# Patient Record
Sex: MALE | Race: WHITE | Employment: OTHER | URBAN - METROPOLITAN AREA
[De-identification: names, ages, dates, MRNs, and addresses within clinical notes are randomized per-mention and may not be internally consistent; named-entity substitution may affect disease eponyms.]

---

## 2017-01-01 ENCOUNTER — DOCUMENTATION ONLY (OUTPATIENT)
Dept: CARDIOLOGY | Facility: CLINIC | Age: 82
End: 2017-01-01

## 2017-01-01 ENCOUNTER — APPOINTMENT (OUTPATIENT)
Dept: GENERAL RADIOLOGY | Facility: CLINIC | Age: 82
End: 2017-01-01
Attending: FAMILY MEDICINE
Payer: MEDICARE

## 2017-01-01 ENCOUNTER — HOSPITAL ENCOUNTER (OUTPATIENT)
Facility: CLINIC | Age: 82
Setting detail: OBSERVATION
Discharge: NURSING, REHAB OR LONG-TERM CARE | End: 2017-09-22
Attending: FAMILY MEDICINE | Admitting: INTERNAL MEDICINE
Payer: MEDICARE

## 2017-01-01 ENCOUNTER — APPOINTMENT (OUTPATIENT)
Dept: PHYSICAL THERAPY | Facility: CLINIC | Age: 82
End: 2017-01-01
Attending: INTERNAL MEDICINE
Payer: MEDICARE

## 2017-01-01 ENCOUNTER — ALLIED HEALTH/NURSE VISIT (OUTPATIENT)
Dept: CARDIOLOGY | Facility: CLINIC | Age: 82
End: 2017-01-01
Payer: COMMERCIAL

## 2017-01-01 VITALS
RESPIRATION RATE: 16 BRPM | WEIGHT: 155.8 LBS | TEMPERATURE: 97 F | HEART RATE: 71 BPM | SYSTOLIC BLOOD PRESSURE: 177 MMHG | DIASTOLIC BLOOD PRESSURE: 72 MMHG | HEIGHT: 71 IN | BODY MASS INDEX: 21.81 KG/M2 | OXYGEN SATURATION: 97 %

## 2017-01-01 DIAGNOSIS — E86.0 DEHYDRATION: ICD-10-CM

## 2017-01-01 DIAGNOSIS — Z95.0 CARDIAC PACEMAKER IN SITU: Primary | ICD-10-CM

## 2017-01-01 DIAGNOSIS — R53.1 WEAKNESS: ICD-10-CM

## 2017-01-01 DIAGNOSIS — I50.9 CONGESTIVE HEART FAILURE, UNSPECIFIED CONGESTIVE HEART FAILURE CHRONICITY, UNSPECIFIED CONGESTIVE HEART FAILURE TYPE: ICD-10-CM

## 2017-01-01 LAB
ALBUMIN SERPL-MCNC: 3.6 G/DL (ref 3.4–5)
ALBUMIN UR-MCNC: 100 MG/DL
ALP SERPL-CCNC: 93 U/L (ref 40–150)
ALT SERPL W P-5'-P-CCNC: 18 U/L (ref 0–70)
ANION GAP SERPL CALCULATED.3IONS-SCNC: 10 MMOL/L (ref 3–14)
ANION GAP SERPL CALCULATED.3IONS-SCNC: 31 MMOL/L (ref 3–14)
APPEARANCE UR: CLEAR
AST SERPL W P-5'-P-CCNC: 12 U/L (ref 0–45)
BASOPHILS # BLD AUTO: 0 10E9/L (ref 0–0.2)
BASOPHILS NFR BLD AUTO: 0.2 %
BILIRUB SERPL-MCNC: 0.6 MG/DL (ref 0.2–1.3)
BILIRUB UR QL STRIP: NEGATIVE
BUN SERPL-MCNC: 42 MG/DL (ref 7–30)
BUN SERPL-MCNC: 49 MG/DL (ref 7–30)
CALCIUM SERPL-MCNC: 8.6 MG/DL (ref 8.5–10.1)
CALCIUM SERPL-MCNC: 9.3 MG/DL (ref 8.5–10.1)
CHLORIDE SERPL-SCNC: 102 MMOL/L (ref 94–109)
CHLORIDE SERPL-SCNC: 106 MMOL/L (ref 94–109)
CO2 SERPL-SCNC: 25 MMOL/L (ref 20–32)
CO2 SERPL-SCNC: 25 MMOL/L (ref 20–32)
COLOR UR AUTO: YELLOW
CREAT SERPL-MCNC: 2.45 MG/DL (ref 0.66–1.25)
CREAT SERPL-MCNC: 2.75 MG/DL (ref 0.66–1.25)
DIFFERENTIAL METHOD BLD: ABNORMAL
EOSINOPHIL # BLD AUTO: 0.1 10E9/L (ref 0–0.7)
EOSINOPHIL NFR BLD AUTO: 2.4 %
ERYTHROCYTE [DISTWIDTH] IN BLOOD BY AUTOMATED COUNT: 13.5 % (ref 10–15)
GFR SERPL CREATININE-BSD FRML MDRD: 22 ML/MIN/1.7M2
GFR SERPL CREATININE-BSD FRML MDRD: 25 ML/MIN/1.7M2
GLUCOSE BLDC GLUCOMTR-MCNC: 101 MG/DL (ref 70–99)
GLUCOSE BLDC GLUCOMTR-MCNC: 115 MG/DL (ref 70–99)
GLUCOSE SERPL-MCNC: 106 MG/DL (ref 70–99)
GLUCOSE SERPL-MCNC: 155 MG/DL (ref 70–99)
GLUCOSE UR STRIP-MCNC: 50 MG/DL
HCT VFR BLD AUTO: 35.4 % (ref 40–53)
HGB BLD-MCNC: 11.4 G/DL (ref 13.3–17.7)
HGB UR QL STRIP: NEGATIVE
HYALINE CASTS #/AREA URNS LPF: 1 /LPF (ref 0–2)
IMM GRANULOCYTES # BLD: 0 10E9/L (ref 0–0.4)
IMM GRANULOCYTES NFR BLD: 0 %
KETONES UR STRIP-MCNC: NEGATIVE MG/DL
LEUKOCYTE ESTERASE UR QL STRIP: NEGATIVE
LYMPHOCYTES # BLD AUTO: 0.5 10E9/L (ref 0.8–5.3)
LYMPHOCYTES NFR BLD AUTO: 9 %
MCH RBC QN AUTO: 30.2 PG (ref 26.5–33)
MCHC RBC AUTO-ENTMCNC: 32.2 G/DL (ref 31.5–36.5)
MCV RBC AUTO: 94 FL (ref 78–100)
MONOCYTES # BLD AUTO: 0.7 10E9/L (ref 0–1.3)
MONOCYTES NFR BLD AUTO: 12.5 %
MUCOUS THREADS #/AREA URNS LPF: PRESENT /LPF
NEUTROPHILS # BLD AUTO: 4.5 10E9/L (ref 1.6–8.3)
NEUTROPHILS NFR BLD AUTO: 75.9 %
NITRATE UR QL: NEGATIVE
NT-PROBNP SERPL-MCNC: 3344 PG/ML (ref 0–1800)
PH UR STRIP: 5 PH (ref 5–7)
PLATELET # BLD AUTO: 132 10E9/L (ref 150–450)
POTASSIUM SERPL-SCNC: 3.4 MMOL/L (ref 3.4–5.3)
POTASSIUM SERPL-SCNC: 4 MMOL/L (ref 3.4–5.3)
PROT SERPL-MCNC: 7.2 G/DL (ref 6.8–8.8)
RBC # BLD AUTO: 3.77 10E12/L (ref 4.4–5.9)
RBC #/AREA URNS AUTO: 4 /HPF (ref 0–2)
SODIUM SERPL-SCNC: 141 MMOL/L (ref 133–144)
SODIUM SERPL-SCNC: 158 MMOL/L (ref 133–144)
SOURCE: ABNORMAL
SP GR UR STRIP: 1.01 (ref 1–1.03)
SQUAMOUS #/AREA URNS AUTO: <1 /HPF (ref 0–1)
TROPONIN I SERPL-MCNC: 0.01 UG/L (ref 0–0.04)
UROBILINOGEN UR STRIP-MCNC: 0 MG/DL (ref 0–2)
WBC # BLD AUTO: 5.9 10E9/L (ref 4–11)
WBC #/AREA URNS AUTO: 1 /HPF (ref 0–2)

## 2017-01-01 PROCEDURE — 36415 COLL VENOUS BLD VENIPUNCTURE: CPT | Performed by: INTERNAL MEDICINE

## 2017-01-01 PROCEDURE — 25000128 H RX IP 250 OP 636: Performed by: FAMILY MEDICINE

## 2017-01-01 PROCEDURE — 83880 ASSAY OF NATRIURETIC PEPTIDE: CPT | Performed by: FAMILY MEDICINE

## 2017-01-01 PROCEDURE — 96372 THER/PROPH/DIAG INJ SC/IM: CPT

## 2017-01-01 PROCEDURE — 97162 PT EVAL MOD COMPLEX 30 MIN: CPT | Mod: GP

## 2017-01-01 PROCEDURE — 99219 ZZC INITIAL OBSERVATION CARE,LEVL II: CPT | Performed by: INTERNAL MEDICINE

## 2017-01-01 PROCEDURE — 84484 ASSAY OF TROPONIN QUANT: CPT | Performed by: FAMILY MEDICINE

## 2017-01-01 PROCEDURE — A9270 NON-COVERED ITEM OR SERVICE: HCPCS | Mod: GY | Performed by: INTERNAL MEDICINE

## 2017-01-01 PROCEDURE — 96361 HYDRATE IV INFUSION ADD-ON: CPT | Performed by: FAMILY MEDICINE

## 2017-01-01 PROCEDURE — 96360 HYDRATION IV INFUSION INIT: CPT | Performed by: FAMILY MEDICINE

## 2017-01-01 PROCEDURE — 00000146 ZZHCL STATISTIC GLUCOSE BY METER IP

## 2017-01-01 PROCEDURE — 80048 BASIC METABOLIC PNL TOTAL CA: CPT | Performed by: INTERNAL MEDICINE

## 2017-01-01 PROCEDURE — 93294 REM INTERROG EVL PM/LDLS PM: CPT | Performed by: INTERNAL MEDICINE

## 2017-01-01 PROCEDURE — 81001 URINALYSIS AUTO W/SCOPE: CPT | Performed by: FAMILY MEDICINE

## 2017-01-01 PROCEDURE — 99285 EMERGENCY DEPT VISIT HI MDM: CPT | Mod: 25 | Performed by: FAMILY MEDICINE

## 2017-01-01 PROCEDURE — 96361 HYDRATE IV INFUSION ADD-ON: CPT

## 2017-01-01 PROCEDURE — 99217 ZZC OBSERVATION CARE DISCHARGE: CPT | Performed by: PEDIATRICS

## 2017-01-01 PROCEDURE — 80053 COMPREHEN METABOLIC PANEL: CPT | Performed by: FAMILY MEDICINE

## 2017-01-01 PROCEDURE — 25000131 ZZH RX MED GY IP 250 OP 636 PS 637: Mod: GY | Performed by: INTERNAL MEDICINE

## 2017-01-01 PROCEDURE — 71010 XR CHEST PORT 1 VW: CPT | Mod: TC

## 2017-01-01 PROCEDURE — 27210995 ZZH RX 272: Performed by: INTERNAL MEDICINE

## 2017-01-01 PROCEDURE — G0378 HOSPITAL OBSERVATION PER HR: HCPCS

## 2017-01-01 PROCEDURE — 40000193 ZZH STATISTIC PT WARD VISIT

## 2017-01-01 PROCEDURE — 93296 REM INTERROG EVL PM/IDS: CPT | Performed by: INTERNAL MEDICINE

## 2017-01-01 PROCEDURE — 85025 COMPLETE CBC W/AUTO DIFF WBC: CPT | Performed by: FAMILY MEDICINE

## 2017-01-01 PROCEDURE — 93005 ELECTROCARDIOGRAM TRACING: CPT | Mod: 59 | Performed by: FAMILY MEDICINE

## 2017-01-01 PROCEDURE — 25800025 ZZH RX 258: Performed by: INTERNAL MEDICINE

## 2017-01-01 PROCEDURE — 25000132 ZZH RX MED GY IP 250 OP 250 PS 637: Mod: GY | Performed by: INTERNAL MEDICINE

## 2017-01-01 PROCEDURE — 99284 EMERGENCY DEPT VISIT MOD MDM: CPT | Mod: Z6 | Performed by: FAMILY MEDICINE

## 2017-01-01 RX ORDER — ONDANSETRON 2 MG/ML
4 INJECTION INTRAMUSCULAR; INTRAVENOUS EVERY 6 HOURS PRN
Status: DISCONTINUED | OUTPATIENT
Start: 2017-01-01 | End: 2017-01-01 | Stop reason: HOSPADM

## 2017-01-01 RX ORDER — AMLODIPINE BESYLATE 5 MG/1
10 TABLET ORAL DAILY
Status: DISCONTINUED | OUTPATIENT
Start: 2017-01-01 | End: 2017-01-01 | Stop reason: HOSPADM

## 2017-01-01 RX ORDER — DEXTROSE MONOHYDRATE 25 G/50ML
25-50 INJECTION, SOLUTION INTRAVENOUS
Status: DISCONTINUED | OUTPATIENT
Start: 2017-01-01 | End: 2017-01-01 | Stop reason: HOSPADM

## 2017-01-01 RX ORDER — ASPIRIN 81 MG/1
81 TABLET ORAL DAILY
Status: DISCONTINUED | OUTPATIENT
Start: 2017-01-01 | End: 2017-01-01 | Stop reason: HOSPADM

## 2017-01-01 RX ORDER — METOPROLOL SUCCINATE 50 MG/1
50 TABLET, EXTENDED RELEASE ORAL DAILY
Status: DISCONTINUED | OUTPATIENT
Start: 2017-01-01 | End: 2017-01-01 | Stop reason: HOSPADM

## 2017-01-01 RX ORDER — SODIUM CHLORIDE 9 MG/ML
1000 INJECTION, SOLUTION INTRAVENOUS CONTINUOUS
Status: DISCONTINUED | OUTPATIENT
Start: 2017-01-01 | End: 2017-01-01

## 2017-01-01 RX ORDER — SODIUM CHLORIDE 450 MG/100ML
INJECTION, SOLUTION INTRAVENOUS CONTINUOUS
Status: DISCONTINUED | OUTPATIENT
Start: 2017-01-01 | End: 2017-01-01 | Stop reason: HOSPADM

## 2017-01-01 RX ORDER — ONDANSETRON 4 MG/1
4 TABLET, ORALLY DISINTEGRATING ORAL EVERY 6 HOURS PRN
Status: DISCONTINUED | OUTPATIENT
Start: 2017-01-01 | End: 2017-01-01 | Stop reason: HOSPADM

## 2017-01-01 RX ORDER — NICOTINE POLACRILEX 4 MG
15-30 LOZENGE BUCCAL
Status: DISCONTINUED | OUTPATIENT
Start: 2017-01-01 | End: 2017-01-01 | Stop reason: HOSPADM

## 2017-01-01 RX ORDER — FINASTERIDE 5 MG/1
5 TABLET, FILM COATED ORAL DAILY
Status: DISCONTINUED | OUTPATIENT
Start: 2017-01-01 | End: 2017-01-01 | Stop reason: HOSPADM

## 2017-01-01 RX ORDER — NALOXONE HYDROCHLORIDE 0.4 MG/ML
.1-.4 INJECTION, SOLUTION INTRAMUSCULAR; INTRAVENOUS; SUBCUTANEOUS
Status: DISCONTINUED | OUTPATIENT
Start: 2017-01-01 | End: 2017-01-01 | Stop reason: HOSPADM

## 2017-01-01 RX ORDER — CLOPIDOGREL BISULFATE 75 MG/1
75 TABLET ORAL DAILY
Status: DISCONTINUED | OUTPATIENT
Start: 2017-01-01 | End: 2017-01-01 | Stop reason: HOSPADM

## 2017-01-01 RX ADMIN — SODIUM CHLORIDE 500 ML: 9 INJECTION, SOLUTION INTRAVENOUS at 21:03

## 2017-01-01 RX ADMIN — SODIUM CHLORIDE 500 ML: 4.5 INJECTION, SOLUTION INTRAVENOUS at 00:50

## 2017-01-01 RX ADMIN — INSULIN GLARGINE 20 UNITS: 100 INJECTION, SOLUTION SUBCUTANEOUS at 08:41

## 2017-01-01 RX ADMIN — OMEPRAZOLE 20 MG: 20 CAPSULE, DELAYED RELEASE ORAL at 08:42

## 2017-01-01 RX ADMIN — SODIUM CHLORIDE: 4.5 INJECTION, SOLUTION INTRAVENOUS at 00:32

## 2017-01-01 RX ADMIN — SODIUM CHLORIDE: 4.5 INJECTION, SOLUTION INTRAVENOUS at 05:18

## 2017-01-01 RX ADMIN — AMLODIPINE BESYLATE 10 MG: 5 TABLET ORAL at 08:42

## 2017-01-01 RX ADMIN — SODIUM CHLORIDE: 4.5 INJECTION, SOLUTION INTRAVENOUS at 13:23

## 2017-01-01 RX ADMIN — ASPIRIN 81 MG: 81 TABLET, COATED ORAL at 08:42

## 2017-01-01 RX ADMIN — METOPROLOL SUCCINATE 50 MG: 50 TABLET, EXTENDED RELEASE ORAL at 08:42

## 2017-01-01 RX ADMIN — CLOPIDOGREL 75 MG: 75 TABLET, FILM COATED ORAL at 08:42

## 2017-01-01 RX ADMIN — FINASTERIDE 5 MG: 5 TABLET, FILM COATED ORAL at 08:42

## 2017-01-01 ASSESSMENT — ACTIVITIES OF DAILY LIVING (ADL)
TOILETING: 0-->INDEPENDENT
NUMBER_OF_TIMES_PATIENT_HAS_FALLEN_WITHIN_LAST_SIX_MONTHS: 6
WHICH_OF_THE_ABOVE_FUNCTIONAL_RISKS_HAD_A_RECENT_ONSET_OR_CHANGE?: AMBULATION
FALL_HISTORY_WITHIN_LAST_SIX_MONTHS: YES
RETIRED_EATING: 0-->INDEPENDENT
CHANGE_IN_FUNCTIONAL_STATUS_SINCE_ONSET_OF_CURRENT_ILLNESS/INJURY: NO
COGNITION: 0 - NO COGNITION ISSUES REPORTED
RETIRED_COMMUNICATION: 0-->UNDERSTANDS/COMMUNICATES WITHOUT DIFFICULTY
AMBULATION: 1-->ASSISTIVE EQUIPMENT
DRESS: 0-->INDEPENDENT
BATHING: 2-->ASSISTIVE PERSON
SWALLOWING: 0-->SWALLOWS FOODS/LIQUIDS WITHOUT DIFFICULTY
TRANSFERRING: 1-->ASSISTIVE EQUIPMENT

## 2017-02-22 ENCOUNTER — HOSPITAL ENCOUNTER (EMERGENCY)
Facility: CLINIC | Age: 82
Discharge: HOME | End: 2017-02-22
Attending: FAMILY MEDICINE | Admitting: FAMILY MEDICINE
Payer: MEDICARE

## 2017-02-22 VITALS
RESPIRATION RATE: 14 BRPM | TEMPERATURE: 96.9 F | SYSTOLIC BLOOD PRESSURE: 157 MMHG | OXYGEN SATURATION: 96 % | HEART RATE: 70 BPM | BODY MASS INDEX: 24.41 KG/M2 | DIASTOLIC BLOOD PRESSURE: 77 MMHG | WEIGHT: 175 LBS

## 2017-02-22 DIAGNOSIS — M62.81 GENERALIZED MUSCLE WEAKNESS: ICD-10-CM

## 2017-02-22 LAB
ALBUMIN SERPL-MCNC: 3.1 G/DL (ref 3.4–5)
ALBUMIN UR-MCNC: 30 MG/DL
ALP SERPL-CCNC: 83 U/L (ref 40–150)
ALT SERPL W P-5'-P-CCNC: 18 U/L (ref 0–70)
ANION GAP SERPL CALCULATED.3IONS-SCNC: 10 MMOL/L (ref 3–14)
APPEARANCE UR: CLEAR
AST SERPL W P-5'-P-CCNC: 11 U/L (ref 0–45)
BACTERIA #/AREA URNS HPF: ABNORMAL /HPF
BASOPHILS # BLD AUTO: 0 10E9/L (ref 0–0.2)
BASOPHILS NFR BLD AUTO: 0.2 %
BILIRUB SERPL-MCNC: 0.7 MG/DL (ref 0.2–1.3)
BILIRUB UR QL STRIP: NEGATIVE
BUN SERPL-MCNC: 35 MG/DL (ref 7–30)
CALCIUM SERPL-MCNC: 8.7 MG/DL (ref 8.5–10.1)
CHLORIDE SERPL-SCNC: 110 MMOL/L (ref 94–109)
CO2 SERPL-SCNC: 25 MMOL/L (ref 20–32)
COLOR UR AUTO: YELLOW
CREAT SERPL-MCNC: 2.16 MG/DL (ref 0.66–1.25)
DIFFERENTIAL METHOD BLD: ABNORMAL
EOSINOPHIL # BLD AUTO: 0.3 10E9/L (ref 0–0.7)
EOSINOPHIL NFR BLD AUTO: 5.8 %
ERYTHROCYTE [DISTWIDTH] IN BLOOD BY AUTOMATED COUNT: 13.5 % (ref 10–15)
GFR SERPL CREATININE-BSD FRML MDRD: 29 ML/MIN/1.7M2
GLUCOSE SERPL-MCNC: 129 MG/DL (ref 70–99)
GLUCOSE UR STRIP-MCNC: NEGATIVE MG/DL
HCT VFR BLD AUTO: 34.8 % (ref 40–53)
HEMOCCULT STL QL: NEGATIVE
HGB BLD-MCNC: 11.5 G/DL (ref 13.3–17.7)
HGB UR QL STRIP: ABNORMAL
HYALINE CASTS #/AREA URNS LPF: ABNORMAL /LPF (ref 0–2)
IMM GRANULOCYTES # BLD: 0 10E9/L (ref 0–0.4)
IMM GRANULOCYTES NFR BLD: 0.2 %
KETONES UR STRIP-MCNC: NEGATIVE MG/DL
LEUKOCYTE ESTERASE UR QL STRIP: NEGATIVE
LIPASE SERPL-CCNC: 35 U/L (ref 73–393)
LYMPHOCYTES # BLD AUTO: 0.7 10E9/L (ref 0.8–5.3)
LYMPHOCYTES NFR BLD AUTO: 13.7 %
MCH RBC QN AUTO: 30.4 PG (ref 26.5–33)
MCHC RBC AUTO-ENTMCNC: 33 G/DL (ref 31.5–36.5)
MCV RBC AUTO: 92 FL (ref 78–100)
MONOCYTES # BLD AUTO: 0.5 10E9/L (ref 0–1.3)
MONOCYTES NFR BLD AUTO: 10.6 %
NEUTROPHILS # BLD AUTO: 3.4 10E9/L (ref 1.6–8.3)
NEUTROPHILS NFR BLD AUTO: 69.5 %
NITRATE UR QL: NEGATIVE
NON-SQ EPI CELLS #/AREA URNS LPF: ABNORMAL /LPF
NT-PROBNP SERPL-MCNC: 1992 PG/ML (ref 0–1800)
PH UR STRIP: 6 PH (ref 5–7)
PLATELET # BLD AUTO: 155 10E9/L (ref 150–450)
POTASSIUM SERPL-SCNC: 3.5 MMOL/L (ref 3.4–5.3)
PROT SERPL-MCNC: 6.3 G/DL (ref 6.8–8.8)
RBC # BLD AUTO: 3.78 10E12/L (ref 4.4–5.9)
RBC #/AREA URNS AUTO: ABNORMAL /HPF (ref 0–2)
SODIUM SERPL-SCNC: 145 MMOL/L (ref 133–144)
SP GR UR STRIP: 1.01 (ref 1–1.03)
TROPONIN I SERPL-MCNC: 0.02 UG/L (ref 0–0.04)
URN SPEC COLLECT METH UR: ABNORMAL
UROBILINOGEN UR STRIP-ACNC: 0.2 EU/DL (ref 0.2–1)
WBC # BLD AUTO: 4.8 10E9/L (ref 4–11)
WBC #/AREA URNS AUTO: ABNORMAL /HPF (ref 0–2)

## 2017-02-22 PROCEDURE — 25000128 H RX IP 250 OP 636: Performed by: FAMILY MEDICINE

## 2017-02-22 PROCEDURE — 93010 ELECTROCARDIOGRAM REPORT: CPT | Performed by: FAMILY MEDICINE

## 2017-02-22 PROCEDURE — 99284 EMERGENCY DEPT VISIT MOD MDM: CPT | Mod: 25

## 2017-02-22 PROCEDURE — 80053 COMPREHEN METABOLIC PANEL: CPT | Performed by: FAMILY MEDICINE

## 2017-02-22 PROCEDURE — 96360 HYDRATION IV INFUSION INIT: CPT

## 2017-02-22 PROCEDURE — 96361 HYDRATE IV INFUSION ADD-ON: CPT

## 2017-02-22 PROCEDURE — 83690 ASSAY OF LIPASE: CPT | Performed by: FAMILY MEDICINE

## 2017-02-22 PROCEDURE — 82272 OCCULT BLD FECES 1-3 TESTS: CPT | Performed by: FAMILY MEDICINE

## 2017-02-22 PROCEDURE — 85025 COMPLETE CBC W/AUTO DIFF WBC: CPT | Performed by: FAMILY MEDICINE

## 2017-02-22 PROCEDURE — 81001 URINALYSIS AUTO W/SCOPE: CPT | Performed by: FAMILY MEDICINE

## 2017-02-22 PROCEDURE — 84484 ASSAY OF TROPONIN QUANT: CPT | Performed by: FAMILY MEDICINE

## 2017-02-22 PROCEDURE — 93005 ELECTROCARDIOGRAM TRACING: CPT

## 2017-02-22 PROCEDURE — 83880 ASSAY OF NATRIURETIC PEPTIDE: CPT | Performed by: FAMILY MEDICINE

## 2017-02-22 PROCEDURE — 99284 EMERGENCY DEPT VISIT MOD MDM: CPT | Mod: 25 | Performed by: FAMILY MEDICINE

## 2017-02-22 RX ORDER — ONDANSETRON 2 MG/ML
4 INJECTION INTRAMUSCULAR; INTRAVENOUS EVERY 6 HOURS PRN
Status: DISCONTINUED | OUTPATIENT
Start: 2017-02-22 | End: 2017-02-22 | Stop reason: HOSPADM

## 2017-02-22 RX ORDER — LIDOCAINE 40 MG/G
CREAM TOPICAL
Status: DISCONTINUED | OUTPATIENT
Start: 2017-02-22 | End: 2017-02-22 | Stop reason: HOSPADM

## 2017-02-22 RX ORDER — SODIUM CHLORIDE 9 MG/ML
1000 INJECTION, SOLUTION INTRAVENOUS CONTINUOUS
Status: DISCONTINUED | OUTPATIENT
Start: 2017-02-22 | End: 2017-02-22 | Stop reason: HOSPADM

## 2017-02-22 RX ADMIN — SODIUM CHLORIDE 1000 ML: 9 INJECTION, SOLUTION INTRAVENOUS at 08:30

## 2017-02-22 RX ADMIN — SODIUM CHLORIDE 1000 ML: 9 INJECTION, SOLUTION INTRAVENOUS at 07:27

## 2017-02-22 ASSESSMENT — ENCOUNTER SYMPTOMS
WOUND: 0
LIGHT-HEADEDNESS: 0
ACTIVITY CHANGE: 1
FEVER: 0
DIZZINESS: 0
CHILLS: 0
FREQUENCY: 0
WEAKNESS: 1
MYALGIAS: 0
DIFFICULTY URINATING: 0
ARTHRALGIAS: 0
VOMITING: 1
POLYDIPSIA: 0
ABDOMINAL DISTENTION: 0
NAUSEA: 1
COUGH: 0
APPETITE CHANGE: 0
DIARRHEA: 1
BRUISES/BLEEDS EASILY: 0
SHORTNESS OF BREATH: 0
DYSURIA: 0
ABDOMINAL PAIN: 0

## 2017-02-22 NOTE — DISCHARGE INSTRUCTIONS
Weakness due to illness  Based on your exam today, I think that you are safe to return home.  Your weakness does not seem to be a sign of a serious illness at this time. Keep an eye on your symptoms and get medical advice as instructed below.  Home care    Rest at home today. Do not over-exert yourself.    Take any medicine as prescribed.    For the next few days, drink extra fluids (unless your healthcare provider wants you to restrict fluids for other reasons). Do not skip meals.  Follow-up care  Follow up with your healthcare provider or as advised.  When to seek medical advice  Call your healthcare provider for any of the following    Worsening of your symptoms    Symptoms don't start getting better within 2 days    Fever of 100.4  F (38  C) or higher, or as directed by your healthcare provider     Call 911  Get emergency medical care for any of these:    Chest, arm, neck, jaw or upper back pain    Trouble breathing    Numbness or weakness of the face, one arm or one leg    Slurred speech, confusion, trouble speaking, walking or seeing    Blood in vomit or stool (black or red color)    Loss of consciousness      Thank you for choosing our Emergency Department for your care.     Sincerely,    Dr Desmond Rogel M.D.

## 2017-02-22 NOTE — ED AVS SNAPSHOT
[facility] Emergency Department    [address] [place] [address]    Phone:  [phone]    Fax:  [phone]                                       Shaun Gotti   MRN: [id number]    Department:  [facility] Emergency Department   Date of Visit:  2/22/2017           After Visit Summary Signature Page     I have received my discharge instructions, and my questions have been answered. I have discussed any challenges I see with this plan with the nurse or doctor.    ..........................................................................................................................................  Patient/Patient Representative Signature      ..........................................................................................................................................  Patient Representative Print Name and Relationship to Patient    ..................................................               ................................................  Date                                            Time    ..........................................................................................................................................  Reviewed by Signature/Title    ...................................................              ..............................................  Date                                                            Time

## 2017-02-22 NOTE — ED PROVIDER NOTES
History     Chief Complaint   Patient presents with     Nausea, Vomiting, & Diarrhea     The history is provided by the patient and the EMS personnel.     Shaun Gotti is a 89 year old male who is here by ambulance. He was trying to clean up the bathroom after he had made a bit of a mess and fell to the floor. He was too weak to get up. The police arrived just for a lift up but when he said he was not feeling well so EMS was called. Shaun has no complaints of pain or injury.  He has been having nausea and vomiting for two days and has not been eating for the past two days. He is very weak.     I have reviewed the Medications, Allergies, Past Medical and Surgical History, and Social History in the Epic system.    Review of Systems   Constitutional: Positive for activity change. Negative for appetite change, chills and fever.   Respiratory: Negative for cough and shortness of breath.    Gastrointestinal: Positive for diarrhea, nausea and vomiting. Negative for abdominal distention and abdominal pain.        Stool looks black to him   Endocrine: Negative for polydipsia and polyuria.   Genitourinary: Negative for decreased urine volume, difficulty urinating, dysuria and frequency.   Musculoskeletal: Negative for arthralgias and myalgias.   Skin: Negative for pallor, rash and wound.   Neurological: Positive for weakness. Negative for dizziness, syncope and light-headedness.   Hematological: Does not bruise/bleed easily.   All other systems reviewed and are negative.      Physical Exam      Physical Exam   Constitutional: He is oriented to person, place, and time. He appears well-developed and well-nourished. No distress.   HENT:   Head: Normocephalic and atraumatic.   Right Ear: External ear normal.   Left Ear: External ear normal.   Nose: Nose normal.   Mouth/Throat: Oropharynx is clear and moist.   Eyes: Conjunctivae and EOM are normal.   Neck: Normal range of motion. Neck supple.   Cardiovascular: Normal rate,  regular rhythm, normal heart sounds and intact distal pulses.    No murmur heard.  Pulmonary/Chest: Effort normal. No respiratory distress. He has no wheezes. He has no rales. He exhibits no tenderness.   Abdominal: Soft. Bowel sounds are normal. He exhibits no distension. There is no tenderness. There is no rebound and no guarding.   Musculoskeletal: He exhibits no edema or tenderness.   Lymphadenopathy:     He has no cervical adenopathy.   Neurological: He is alert and oriented to person, place, and time.   Skin: Skin is warm and dry. No rash noted. He is not diaphoretic. No erythema.   Psychiatric: He has a normal mood and affect. His behavior is normal.   Nursing note and vitals reviewed.      ED Course     ED Course     Procedures             EKG Interpretation:      Interpreted by Stephan Rogel  Time reviewed:  7:35 AM   Symptoms at time of EKG: weakness   Rhythm: normal sinus   Rate: normal  Axis: left axis  Ectopy: none  Conduction: normal  ST Segments/ T Waves: No ST-T wave changes  Q Waves: none  Comparison to prior: No old EKG available    Clinical Impression: normal EKG          Results for orders placed or performed during the hospital encounter of 02/22/17 (from the past 24 hour(s))   CBC with platelets differential   Result Value Ref Range    WBC 4.8 4.0 - 11.0 10e9/L    RBC Count 3.78 (L) 4.4 - 5.9 10e12/L    Hemoglobin 11.5 (L) 13.3 - 17.7 g/dL    Hematocrit 34.8 (L) 40.0 - 53.0 %    MCV 92 78 - 100 fl    MCH 30.4 26.5 - 33.0 pg    MCHC 33.0 31.5 - 36.5 g/dL    RDW 13.5 10.0 - 15.0 %    Platelet Count 155 150 - 450 10e9/L    Diff Method Automated Method     % Neutrophils 69.5 %    % Lymphocytes 13.7 %    % Monocytes 10.6 %    % Eosinophils 5.8 %    % Basophils 0.2 %    % Immature Granulocytes 0.2 %    Absolute Neutrophil 3.4 1.6 - 8.3 10e9/L    Absolute Lymphocytes 0.7 (L) 0.8 - 5.3 10e9/L    Absolute Monocytes 0.5 0.0 - 1.3 10e9/L    Absolute Eosinophils 0.3 0.0 - 0.7 10e9/L    Absolute  Basophils 0.0 0.0 - 0.2 10e9/L    Abs Immature Granulocytes 0.0 0 - 0.4 10e9/L   Comprehensive metabolic panel   Result Value Ref Range    Sodium 145 (H) 133 - 144 mmol/L    Potassium 3.5 3.4 - 5.3 mmol/L    Chloride 110 (H) 94 - 109 mmol/L    Carbon Dioxide 25 20 - 32 mmol/L    Anion Gap 10 3 - 14 mmol/L    Glucose 129 (H) 70 - 99 mg/dL    Urea Nitrogen 35 (H) 7 - 30 mg/dL    Creatinine 2.16 (H) 0.66 - 1.25 mg/dL    GFR Estimate 29 (L) >60 mL/min/1.7m2    GFR Estimate If Black 35 (L) >60 mL/min/1.7m2    Calcium 8.7 8.5 - 10.1 mg/dL    Bilirubin Total 0.7 0.2 - 1.3 mg/dL    Albumin 3.1 (L) 3.4 - 5.0 g/dL    Protein Total 6.3 (L) 6.8 - 8.8 g/dL    Alkaline Phosphatase 83 40 - 150 U/L    ALT 18 0 - 70 U/L    AST 11 0 - 45 U/L   Lipase   Result Value Ref Range    Lipase 35 (L) 73 - 393 U/L   Nt probnp inpatient   Result Value Ref Range    N-Terminal Pro BNP Inpatient 1992 (H) 0 - 1800 pg/mL   Troponin I   Result Value Ref Range    Troponin I ES 0.018 0.000 - 0.045 ug/L   Occult blood stool   Result Value Ref Range    Occult Blood Negative NEG   UA with Microscopic   Result Value Ref Range    Color Urine Yellow     Appearance Urine Clear     Glucose Urine Negative NEG mg/dL    Bilirubin Urine Negative NEG    Ketones Urine Negative NEG mg/dL    Specific Gravity Urine 1.015 1.003 - 1.035    pH Urine 6.0 5.0 - 7.0 pH    Protein Albumin Urine 30 (A) NEG mg/dL    Urobilinogen Urine 0.2 0.2 - 1.0 EU/dL    Nitrite Urine Negative NEG    Blood Urine Small (A) NEG    Leukocyte Esterase Urine Negative NEG    Source Unspecified Urine     WBC Urine O - 2 0 - 2 /HPF    RBC Urine O - 2 0 - 2 /HPF    Hyaline Casts O - 2 0 - 2 /LPF    Squamous Epithelial /LPF Urine Moderate (A) FEW /LPF    Bacteria Urine Few (A) NEG /HPF       Medications   0.9% sodium chloride BOLUS (0 mLs Intravenous Stopped 2/22/17 6432)           Assessments & Plan (with Medical Decision Making)  Shaun came in by ambulance after he was too weak to get up off the  floor.  He had got down on the floor to clean up some diarrhea that he had at home and was unable to get up.  He called 911 and the police came for a lift, but when they realized the patient was 60 called EMS.  Here in the ED the patient is alert and does not have any pain.  He's had vomiting for 2 days followed by diarrhea.  He tells me the diarrhea was black.  Physical exam was unremarkable, including rectal exam with no gross blood.  Hemoccult stool is pending.  Labs are coming back with mild anemia with a hemoglobin of 11.5, elevated creatinine 2.16 and low albumin and low protein.  EKG shows a sinus rhythm with left axis and a normal rate.  His ED stay was unremarkable.  We gave him some IV fluids and the patient was ambulatory in the emergency department.  His family was here with him, including his son who is his caregiver.  They all agreed that he seemed to be doing well.  The patient was discharged from the ED.       I have reviewed the nursing notes.    I have reviewed the findings, diagnosis, plan and need for follow up with the patient.    Discharge Medication List as of 2/22/2017 11:31 AM          Final diagnoses:   Generalized muscle weakness       2/22/2017   [facility] EMERGENCY DEPARTMENT     Stephan Rogel MD  02/22/17 5057

## 2017-02-22 NOTE — ED NOTES
Ambulate patient with walker- no complications and he has a steady gate.  Patient states his legs feel stronger than when he came in.  Patient and son state they feel comfortable with him going home at this time.  MD notified.

## 2017-02-22 NOTE — ED NOTES
Pt comes in via EMS for complaints of nausea, vomiting, and diarrhea. Pt was vomiting 2 days ago and started having diarrhea yesterday.

## 2017-03-27 ENCOUNTER — ALLIED HEALTH/NURSE VISIT (OUTPATIENT)
Dept: CARDIOLOGY | Facility: CLINIC | Age: 82
End: 2017-03-27
Payer: COMMERCIAL

## 2017-03-27 DIAGNOSIS — Z95.0 CARDIAC PACEMAKER IN SITU: Primary | ICD-10-CM

## 2017-03-27 PROCEDURE — 93296 REM INTERROG EVL PM/IDS: CPT | Performed by: INTERNAL MEDICINE

## 2017-03-27 PROCEDURE — 93294 REM INTERROG EVL PM/LDLS PM: CPT | Performed by: INTERNAL MEDICINE

## 2017-03-27 NOTE — MR AVS SNAPSHOT
"              After Visit Summary   3/27/2017    Shaun Gotti    MRN: [id number]           Patient Information     Date Of Birth          5/23/1925        Visit Information        Provider Department      3/27/2017 4:30 PM [facility] TE[facility]1 [facility]        Today's Diagnoses     Cardiac pacemaker in situ    -  1       Follow-ups after your visit        Your next 10 appointments already scheduled     Mar 27, 2017  4:30 PM CDT   Remote PPM Check with [facility] TECH1   [facility] ([facility])    [address] [place] [address] [phone]           This appointment is for a remote check of your pacemaker.  This is not an appointment at the office.              Who to contact     If you have questions or need follow up information about today's clinic visit or your schedule please contact [facility] directly at [phone].  Normal or non-critical lab and imaging results will be communicated to you by [employer], letter or phone within 4 business days after the clinic has received the results. If you do not hear from us within 7 days, please contact the clinic through [employer] or phone. If you have a critical or abnormal lab result, we will notify you by phone as soon as possible.  Submit refill requests through [employer] or call your pharmacy and they will forward the refill request to us. Please allow 3 business days for your refill to be completed.          Additional Information About Your Visit        [employer] Information     [employer] lets you send messages to your doctor, view your test results, renew your prescriptions, schedule appointments and more. To sign up, go to [email or web address] . Click on \"Log in\" on the left side of the screen, which will take you to the Welcome page. Then click on \"Sign up Now\" on the right side of the page.     You will be asked to enter " the access code listed below, as well as some personal information. Please follow the directions to create your username and password.     Your access code is: [email or web address]  Expires: 2017 12:31 PM     Your access code will  in 90 days. If you need help or a new code, please call your [facility] clinic or [phone].        Care EveryWhere ID     This is your Care EveryWhere ID. This could be used by other organizations to access your [facility] medical records  [id number]         Blood Pressure from Last 3 Encounters:   17 157/77   16 146/60   11/09/15 146/74    Weight from Last 3 Encounters:   17 79.4 kg (175 lb)   16 78.5 kg (173 lb 1 oz)   10/12/15 79.5 kg (175 lb 3.2 oz)              We Performed the Following     INTERROGATION DEVICE EVAL REMOTE, PACER/ICD (91813)     PM DEVICE INTERROGATE REMOTE (19192)        Primary Care Provider Office Phone # Fax #    Sony Berkowitz [phone]       [facility] [address] [place] [address]        Thank you!     Thank you for choosing [facility]  for your care. Our goal is always to provide you with excellent care. Hearing back from our patients is one way we can continue to improve our services. Please take a few minutes to complete the written survey that you may receive in the mail after your visit with us. Thank you!             Your Updated Medication List - Protect others around you: Learn how to safely use, store and throw away your medicines at www.disposemymeds.org.          This list is accurate as of: 3/27/17  9:26 AM.  Always use your most recent med list.                   Brand Name Dispense Instructions for use    acetaminophen 500 MG tablet    TYLENOL     1 TABLET THREE TIMES A DAY. DO NOT EXCEED 4000MG OF ACETAMINOPHEN IN A 24 HOUR PERIOD.       amLODIPine 10 MG tablet    NORVASC     1 TABLET DAILY       aspirin 81 MG tablet      1 TABLET  DAILY       ATORVASTATIN CALCIUM PO      Take 20 mg by mouth daily       clopidogrel 75 MG tablet    PLAVIX     1 TABLET DAILY       finasteride 5 MG tablet    PROSCAR    30    1 TABLET DAILY       hypromellose 0.3 % Soln ophthalmic solution    GENTEAL     1 drop 4 times daily       LANTUS VIAL 100 UNIT/ML injection   Generic drug:  insulin glargine      Inject 20 Units Subcutaneous every morning       METOPROLOL SUCCINATE PO      Take 50 mg by mouth daily.       NEPHROCAPS PO      1 CAPSULE DAILY       nitroglycerin 0.4 MG sublingual tablet    NITROSTAT     1 TAB EVERY 5 MIN AS NEEDED FOR CHEST PAIN, UP TO 3 PER EPISODE       OCUVITE ADULT FORMULA PO      1 TABLET BY MOUTH TWICE DAILY       omeprazole 20 MG CR capsule    priLOSEC     1 CAPSULE DAILY       * order for DME     1 Device    Equipment being ordered: 4 wheel walker with brakes and seat       * order for DME     1 Device    Equipment being ordered: walker       * order for DME     1 Device    Equipment being ordered: transfer bridge       sertraline 25 MG tablet    ZOLOFT    30 tablet    Take 1 tablet (25 mg) by mouth daily       TAMSULOSIN HCL CAPS 0.4 MG OR      1 CAPSULE DAILY       VITAMIN D (CHOLECALCIFEROL) PO      Take 1,000 Units by mouth 2 times daily. Take 2 tablets twice daily for 30 days, then take 1 tablet twice daily.       * Notice:  This list has 3 medication(s) that are the same as other medications prescribed for you. Read the directions carefully, and ask your doctor or other care provider to review them with you.

## 2017-03-27 NOTE — PROGRESS NOTES
[employer] Adapta (D) Remote PPM Device Check  AP: <1 % : 3 %  Mode: DDD        Presenting Rhythm: AS/VS, rate 71bpm  Heart Rate: Adequate rates per histogram  Sensing: Stable    Pacing Threshold: Stable    Impedance: Stable  Battery Status: 10-14.5 years  Atrial Arrhythmia: None  Ventricular Arrhythmia: None     Care Plan: F/u PPM [facility] q 3 months. LM with results. SILVIA LagunasT
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
## 2017-09-21 PROBLEM — E87.0 HYPERNATREMIA: Status: ACTIVE | Noted: 2017-01-01

## 2017-09-21 NOTE — IP AVS SNAPSHOT
"          [address] SURGICAL: [phone]                                              INTERAGENCY TRANSFER FORM - LAB / IMAGING / EKG / EMG RESULTS   2017                    Hospital Admission Date: 2017  JANELLE DINA TREADWELLDENISE   : 1925  Sex: Male        Attending Provider: Kurt Carrillo MD     Allergies:  Fluoxetine, Lisinopril    Infection:  None   Service:  HOSPITALIST    Ht:  1.803 m (5' 11\")   Wt:  70.7 kg (155 lb 12.8 oz)   Admission Wt:  77.1 kg (170 lb)    BMI:  21.73 kg/m 2   BSA:  1.88 m 2            Patient PCP Information     Provider PCP Type    Sony Berkowitz MD General         Lab Results - 3 Days      Glucose by meter [[id number]] (Abnormal)  Resulted: 17 0831, Result status: Final result    Ordering provider: Peter Vann MD  17 08 Resulting lab: POINT OF CARE TEST, GLUCOSE    Specimen Information    Type Source Collected On     17 0826          Components       Value Reference Range Flag Lab   Glucose 101 70 - 99 mg/dL H 170            Basic metabolic panel [[id number]] (Abnormal)  Resulted: 17 0603, Result status: Final result    Ordering provider: Peter Vann MD  17 0001 Resulting lab: [facility]    Specimen Information    Type Source Collected On   Blood  17 0525          Components       Value Reference Range Flag Lab   Sodium 141 133 - 144 mmol/L  [facility]   Potassium 3.4 3.4 - 5.3 mmol/L  FN Lab   Chloride 106 94 - 109 mmol/L  [facility]   Carbon Dioxide 25 20 - 32 mmol/L  [facility]   Anion Gap 10 3 - 14 mmol/L  [facility]   Glucose 106 70 - 99 mg/dL H [facility] Lab   Urea Nitrogen 42 7 - 30 mg/dL H [facility]   Creatinine 2.45 0.66 - 1.25 mg/dL H [facility] Lab   GFR Estimate 25 >60 mL/min/1.7m2 L [facility]   Comment:  Non  GFR Calc   GFR Estimate If Black 30 >60 mL/min/1.7m2 L [facility]   Comment:  African American GFR Calc   Calcium 8.6 8.5 - 10.1 mg/dL  [facility]            Glucose by " meter [[id number]] (Abnormal)  Resulted: 09/22/17 0006, Result status: Final result    Ordering provider: Peter Vann MD  09/21/17 2359 Resulting lab: POINT OF CARE TEST, GLUCOSE    Specimen Information    Type Source Collected On     09/21/17 2359          Components       Value Reference Range Flag Lab   Glucose 115 70 - 99 mg/dL H 170            UA with Microscopic [[id number]] (Abnormal)  Resulted: 09/21/17 2106, Result status: Final result    Ordering provider: Hawk Arteaga MD  09/21/17 1953 Resulting lab: [facility]    Specimen Information    Type Source Collected On   Unspecified Urine Random Urine 09/21/17 2020          Components       Value Reference Range Flag Lab   Color Urine Yellow   [facility]   Appearance Urine Clear   [facility]   Glucose Urine 50 NEG^Negative mg/dL A [facility]   Bilirubin Urine Negative NEG^Negative  [facility]   Ketones Urine Negative NEG^Negative mg/dL  [facility]   Specific [place] Urine 1.014 1.003 - 1.035  [facility]   Blood Urine Negative NEG^Negative  [facility]   pH Urine 5.0 5.0 - 7.0 pH  [facility]   Protein Albumin Urine 100 NEG^Negative mg/dL A [facility]   Urobilinogen mg/dL 0.0 0.0 - 2.0 mg/dL  [facility]   Nitrite Urine Negative NEG^Negative  [facility]   Leukocyte Esterase Urine Negative NEG^Negative  [facility]   Source Unspecified Urine   [facility]   WBC Urine 1 0 - 2 /HPF  [facility]   RBC Urine 4 0 - 2 /HPF H [facility]   Squamous Epithelial /HPF Urine <1 0 - 1 /HPF  [facility]   Mucous Urine Present NEG^Negative /LPF A [facility]   Hyaline Casts 1 0 - 2 /LPF  [facility]            Troponin I [[id number]]  Resulted: 09/21/17 2030, Result status: Final result    Ordering provider: Hawk Arteaga MD  09/21/17 1953 Resulting lab: [facility]    Specimen Information    Type Source Collected On   Blood  09/21/17 2003          Components       Value Reference Range Flag Lab   Troponin I ES 0.015 0.000 - 0.045 ug/L  [facility]   Comment:         The  99th percentile for upper reference range is 0.045 ug/L.  Troponin values   in the range of 0.045 - 0.120 ug/L may be associated with risks of adverse   clinical events.              Comprehensive metabolic panel [[id number]] (Abnormal)  Resulted: 09/21/17 2029, Result status: Final result    Ordering provider: Hawk Arteaga MD  09/21/17 1953 Resulting lab: [facility]    Specimen Information    Type Source Collected On   Blood  09/21/17 2003          Components       Value Reference Range Flag Lab   Sodium 158 133 - 144 mmol/L H [facility] Lab   Potassium 4.0 3.4 - 5.3 mmol/L  [facility]   Chloride 102 94 - 109 mmol/L  [facility]   Carbon Dioxide 25 20 - 32 mmol/L  [facility]   Anion Gap 31 3 - 14 mmol/L H [facility] Lab   Glucose 155 70 - 99 mg/dL H [facility]   Urea Nitrogen 49 7 - 30 mg/dL H [facility]   Creatinine 2.75 0.66 - 1.25 mg/dL H [facility] Lab   GFR Estimate 22 >60 mL/min/1.7m2 L [facility]   Comment:  Non  GFR Calc   GFR Estimate If Black 26 >60 mL/min/1.7m2 L [facility]   Comment:  African American GFR Calc   Calcium 9.3 8.5 - 10.1 mg/dL  [facility]   Bilirubin Total 0.6 0.2 - 1.3 mg/dL  [facility]   Albumin 3.6 3.4 - 5.0 g/dL  [facility]   Protein Total 7.2 6.8 - 8.8 g/dL  [facility]   Alkaline Phosphatase 93 40 - 150 U/L  [facility]   ALT 18 0 - 70 U/L  [facility]   AST 12 0 - 45 U/L  [facility]            Nt probnp inpatient (BNP) [[id number]] (Abnormal)  Resulted: 09/21/17 2029, Result status: Final result    Ordering provider: Hawk Arteaga MD  09/21/17 1953 Resulting lab: [facility]    Specimen Information    Type Source Collected On   Blood  09/21/17 2003          Components       Value Reference Range Flag Lab   N-Terminal Pro BNP Inpatient 3344 0 - 1800 pg/mL H [facility]   Comment:            Reference range shown and results flagged as abnormal are suggested inpatient   cut points for confirming diagnosis if CHF in an acute setting. Establishing a   baseline value for each  individual patient is useful for follow-up. An   inpatient or emergency department NT-proPBNP <300 pg/mL effectively rules out   acute CHF, with 99% negative predictive value.  The outpatient non-acute reference range for ruling out CHF is:   0-125 pg/mL (age 18 to less than 75)   0-450 pg/mL (age 75 yrs and older)              CBC with platelets differential [[id number]] (Abnormal)  Resulted: 09/21/17 2009, Result status: Final result    Ordering provider: Hawk Arteaga MD  09/21/17 1953 Resulting lab: [facility]    Specimen Information    Type Source Collected On   Blood  09/21/17 2003          Components       Value Reference Range Flag Lab   WBC 5.9 4.0 - 11.0 10e9/L  [facility]   RBC Count 3.77 4.4 - 5.9 10e12/L L [facility]   Hemoglobin 11.4 13.3 - 17.7 g/dL L [facility]   Hematocrit 35.4 40.0 - 53.0 % L [facility]   MCV 94 78 - 100 fl  [facility]   MCH 30.2 26.5 - 33.0 pg  [facility]   MCHC 32.2 31.5 - 36.5 g/dL  [facility]   RDW 13.5 10.0 - 15.0 %  [facility]   Platelet Count 132 150 - 450 10e9/L L [facility]   Diff Method Automated Method   [facility]   % Neutrophils 75.9 %  [facility]   % Lymphocytes 9.0 %  [facility]   % Monocytes 12.5 %  [facility]   % Eosinophils 2.4 %  [facility]   % Basophils 0.2 %  [facility]   % Immature Granulocytes 0.0 %  [facility]   Absolute Neutrophil 4.5 1.6 - 8.3 10e9/L  [facility]   Absolute Lymphocytes 0.5 0.8 - 5.3 10e9/L L [facility]   Absolute Monocytes 0.7 0.0 - 1.3 10e9/L  [facility]   Absolute Eosinophils 0.1 0.0 - 0.7 10e9/L  [facility]   Absolute Basophils 0.0 0.0 - 0.2 10e9/L  [facility]   Abs Immature Granulocytes 0.0 0 - 0.4 10e9/L  [facility]            Testing Performed By     Lab - Abbreviation Name Director Address Valid Date Range    22 - [facility] Unknown [address] [place] [address] 05/08/15 1057 - Present    170 - Unknown POINT OF CARE TEST, GLUCOSE Unknown Unknown 10/31/11 1114 - Present            Unresulted Labs     None         Imaging Results -  3 Days      XR Chest Port 1 View [[id number]]  Resulted: 09/21/17 2132, Result status: Final result    Ordering provider: Hawk Arteaga MD  09/21/17 2058 Resulted by: Eugenie Hernandez MD    Performed: 09/21/17 2106 - 09/21/17 2117 Resulting lab: RADIOLOGY RESULTS    Narrative:       CHEST PORTABLE ONE VIEW  9/21/2017 9:17 PM     COMPARISON: Frontal chest x-ray 8/16/2016.    HISTORY: Weakness, elevated BNP.    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    The cardiac silhouette, pulmonary vasculature, lungs and pleural  spaces are within normal limits.      Impression:       IMPRESSION: Clear lungs.    EUGENIE HERNANDEZ MD      Testing Performed By     Lab - Abbreviation Name Director Address Valid Date Range    104 - Rad [facility] RADIOLOGY RESULTS Unknown Unknown 02/16/05 1553 - Present            Encounter-Level Documents:     There are no encounter-level documents.      Order-Level Documents:     There are no order-level documents.

## 2017-09-21 NOTE — IP AVS SNAPSHOT
MRN:[id number]                      After Visit Summary   9/21/2017    Shaun Gotti    MRN: [id number]           Thank you!     Thank you for choosing [facility] for your care. Our goal is always to provide you with excellent care. Hearing back from our patients is one way we can continue to improve our services. Please take a few minutes to complete the written survey that you may receive in the mail after you visit with us. Thank you!        Patient Information     Date Of Birth          5/23/1925        About your hospital stay     You were admitted on:  September 21, 2017 You last received care in the:  [address] [facility]    You were discharged on:  September 22, 2017        Reason for your hospital stay       Hospitalized with weakness due to dehydration (with high sodium level) from not drinking enough fluids and improved                  Who to Call     For medical emergencies, please call 911.  For non-urgent questions about your medical care, please call your primary care provider or clinic, [phone]          Attending Provider     Provider Specialty    Hawk Arteaga MD Emergency Medicine    Kurt Carrillo MD Internal Medicine       Primary Care Provider Office Phone # Fax #    Sony Berkowitz [phone]      After Care Instructions     Activity - Up with assistive device           Advance Diet as Tolerated       Follow this diet upon discharge: Orders Placed This Encounter      Moderate Consistent CHO Diet            General info for SNF       Length of Stay Estimate: Short Term Care: Estimated # of Days <30  Condition at Discharge: Stable  Level of care:skilled   Rehabilitation Potential: Fair  Admission H&P remains valid and up-to-date: Yes  Recent Chemotherapy: N/A  Use Nursing Home Standing Orders: Yes            Glucose monitor nursing POCT       Once daily at varying times before meals and at bedtime                  Follow-up  "Appointments     Follow Up and recommended labs and tests       Follow up with Nursing home provider.  The following labs/tests are recommended: basic metabolic panel within 1 week.                  Additional Services     Physical Therapy Adult Consult       Evaluate and treat as clinically indicated.    Reason:  Weakness, unsteady gait                  Pending Results     No orders found for last 3 day(s).            Statement of Approval     Ordered          09/22/17 1337  I have reviewed and agree with all the recommendations and orders detailed in this document.  EFFECTIVE NOW     Approved and electronically signed by:  Kurt Carrillo MD             Admission Information     Date & Time Provider Department Dept. Phone    9/21/2017 Kurt Carrillo MD [address] [facility] [phone]      Your Vitals Were     Blood Pressure Pulse Temperature Respirations Height Weight    177/72 71 97  F (36.1  C) 16 1.803 m (5' 11\") 70.7 kg (155 lb 12.8 oz)    Pulse Oximetry BMI (Body Mass Index)                97% 21.73 kg/m2          Care EveryWhere ID     This is your Care EveryWhere ID. This could be used by other organizations to access your [place] medical records  [id number]        Equal Access to Services     JOHN RAGSDALE : Hadii celine bianchi Soaleisha, waaxda luqadaha, qaybta kaalmada storm, callie cerda. So [facility] [phone].    ATENCIÓN: Si habla español, tiene a castano disposición servicios gratuitos de asistencia lingüística. Maycol al [phone].    We comply with applicable federal civil rights laws and [place] laws. We do not discriminate on the basis of race, color, national origin, age, disability sex, sexual orientation or gender identity.               Review of your medicines      CONTINUE these medicines which have NOT CHANGED        Dose / Directions    acetaminophen 500 MG tablet   Commonly known as:  TYLENOL        1 TABLET THREE TIMES A DAY. DO " NOT EXCEED 4000MG OF ACETAMINOPHEN IN A 24 HOUR PERIOD.   Refills:  0       amLODIPine 10 MG tablet   Commonly known as:  NORVASC        1 TABLET DAILY   Refills:  0       aspirin 81 MG tablet        1 TABLET DAILY   Refills:  0       ATORVASTATIN CALCIUM PO        Dose:  20 mg   Take 20 mg by mouth daily   Refills:  0       clopidogrel 75 MG tablet   Commonly known as:  PLAVIX        1 TABLET DAILY   Refills:  0       finasteride 5 MG tablet   Commonly known as:  PROSCAR        1 TABLET DAILY   Quantity:  30   Refills:  0       hypromellose 0.3 % Soln ophthalmic solution   Commonly known as:  GENTEAL        Dose:  1 drop   1 drop 4 times daily   Refills:  0       LANTUS VIAL 100 UNIT/ML injection   Generic drug:  insulin glargine        Dose:  20 Units   Inject 20 Units Subcutaneous every morning   Refills:  0       METOPROLOL SUCCINATE PO        Dose:  50 mg   Take 50 mg by mouth daily.   Refills:  0       nitroGLYcerin 0.4 MG sublingual tablet   Commonly known as:  NITROSTAT        1 TAB EVERY 5 MIN AS NEEDED FOR CHEST PAIN, UP TO 3 PER EPISODE   Refills:  0       omeprazole 20 MG CR capsule   Commonly known as:  priLOSEC        1 CAPSULE DAILY   Refills:  0       * order for DME   Used for:  Low back pain, Fall, initial encounter        Equipment being ordered: 4 wheel walker with brakes and seat   Quantity:  1 Device   Refills:  0       * order for DME   Used for:  Sepsis(995.91), Falls frequently        Equipment being ordered: walker   Quantity:  1 Device   Refills:  0       * order for DME   Used for:  Sepsis(995.91), Falls frequently        Equipment being ordered: transfer bridge   Quantity:  1 Device   Refills:  0       PRESERVISION AREDS 2 PO        Dose:  2 tablet   Take 2 tablets by mouth daily One in AM; one in PM   Refills:  0       RENAL MULTIVITAMIN FORMULA PO        Dose:  1 capsule   Take 1 capsule by mouth every morning   Refills:  0       sertraline 25 MG tablet   Commonly known as:  ZOLOFT         Dose:  25 mg   Take 1 tablet (25 mg) by mouth daily   Quantity:  30 tablet   Refills:  0       TAMSULOSIN HCL CAPS 0.4 MG OR        1 CAPSULE DAILY   Refills:  0       VITAMIN D (CHOLECALCIFEROL) PO        Dose:  1000 Units   Take 1,000 Units by mouth 2 times daily. Take 2 tablets twice daily for 30 days, then take 1 tablet twice daily.   Refills:  0       * Notice:  This list has 3 medication(s) that are the same as other medications prescribed for you. Read the directions carefully, and ask your doctor or other care provider to review them with you.             Protect others around you: Learn how to safely use, store and throw away your medicines at www.disposemymeds.org.             Medication List: This is a list of all your medications and when to take them. Check marks below indicate your daily home schedule. Keep this list as a reference.      Medications           Morning Afternoon Evening Bedtime As Needed    acetaminophen 500 MG tablet   Commonly known as:  TYLENOL   1 TABLET THREE TIMES A DAY. DO NOT EXCEED 4000MG OF ACETAMINOPHEN IN A 24 HOUR PERIOD.                                amLODIPine 10 MG tablet   Commonly known as:  NORVASC   1 TABLET DAILY   Last time this was given:  10 mg on 9/22/2017  8:42 AM                                aspirin 81 MG tablet   1 TABLET DAILY                                ATORVASTATIN CALCIUM PO   Take 20 mg by mouth daily                                clopidogrel 75 MG tablet   Commonly known as:  PLAVIX   1 TABLET DAILY   Last time this was given:  75 mg on 9/22/2017  8:42 AM                                finasteride 5 MG tablet   Commonly known as:  PROSCAR   1 TABLET DAILY   Last time this was given:  5 mg on 9/22/2017  8:42 AM                                hypromellose 0.3 % Soln ophthalmic solution   Commonly known as:  GENTEAL   1 drop 4 times daily                                LANTUS VIAL 100 UNIT/ML injection   Inject 20 Units Subcutaneous every morning    Last time this was given:  20 Units on 9/22/2017  8:41 AM   Generic drug:  insulin glargine                                METOPROLOL SUCCINATE PO   Take 50 mg by mouth daily.   Last time this was given:  50 mg on 9/22/2017  8:42 AM                                nitroGLYcerin 0.4 MG sublingual tablet   Commonly known as:  NITROSTAT   1 TAB EVERY 5 MIN AS NEEDED FOR CHEST PAIN, UP TO 3 PER EPISODE                                omeprazole 20 MG CR capsule   Commonly known as:  priLOSEC   1 CAPSULE DAILY   Last time this was given:  20 mg on 9/22/2017  8:42 AM                                * order for DME   Equipment being ordered: 4 wheel walker with brakes and seat                                * order for DME   Equipment being ordered: walker                                * order for DME   Equipment being ordered: transfer bridge                                PRESERVISION AREDS 2 PO   Take 2 tablets by mouth daily One in AM; one in PM                                RENAL MULTIVITAMIN FORMULA PO   Take 1 capsule by mouth every morning                                sertraline 25 MG tablet   Commonly known as:  ZOLOFT   Take 1 tablet (25 mg) by mouth daily                                TAMSULOSIN HCL CAPS 0.4 MG OR   1 CAPSULE DAILY                                VITAMIN D (CHOLECALCIFEROL) PO   Take 1,000 Units by mouth 2 times daily. Take 2 tablets twice daily for 30 days, then take 1 tablet twice daily.                                * Notice:  This list has 3 medication(s) that are the same as other medications prescribed for you. Read the directions carefully, and ask your doctor or other care provider to review them with you.

## 2017-09-21 NOTE — IP AVS SNAPSHOT
Shaun Gotti #[id number] (CSN: [id number])  (89 year old M)  (Adm: 17)     [id number]               [address] MEDICAL SURGICAL: [phone]            Patient Demographics     Patient Name Sex          Age SSN Address Phone    Shaun Gotti Male 1925 (89 year old) [id number]   [facility] [address] [phone] (Home)  NONE (Work)  NONE (Mobile)      Emergency Contact(s)     Name Relation Home Work Mobile    ShenPanchito Son [phone]    ShenQian Daughter [phone] none [phone]    GottiVika rucker Daughter [phone] none [phone]      Admission Information     Attending Provider Admitting Provider Admission Type Admission Date/Time    Kurt Carrillo MD [facility], Peter Martni MD Emergency 17    Discharge Date Hospital Service Auth/Cert Status Service Area     Hospitalist Incomplete [facility]    Unit Room/Bed Admission Status        2A MEDICAL SURGICAL 267- Admission (Confirmed)       Admission     Complaint    Weakness      Hospital Account     Name Acct ID Class Status Primary Coverage    Shaun Gotti [id number] Observation Open MEDICARE - MEDICARE FOR HB SUPPLEMENT            Guarantor Account (for Hospital Account #[id number])     Name Relation to Pt Service Area Active? Acct Type    Shaun Gotti  FCS Yes Personal/Family    Address Phone          [address] [place] [address] [phone](H)              Coverage Information (for Hospital Account #[id number])     1. MEDICARE/MEDICARE FOR HB SUPPLEMENT     F/O Payor/Plan Precert #    MEDICARE/MEDICARE FOR HB SUPPLEMENT     Subscriber Subscriber #    Shaun Gotti [id number]    Address Phone    ATTN CLAIMS  [address]  [facility] [place] [address] [phone]          2. BCBS/BCBS PLATINUM BLUE     F/O Payor/Plan Precert #    BCBS/BCBS PLATINUM BLUE     Subscriber Subscriber #    Shaun Gotti  "[id number]    Address Phone    [address] [address]  [place] [address] [phone]                                                      INTERAGENCY TRANSFER FORM - PHYSICIAN ORDERS   9/21/2017                       [address] MEDICAL SURGICAL: [phone]            Attending Provider: Kurt Carrillo MD     Allergies:  Fluoxetine, Lisinopril    Infection:  None   Service:  HOSPITALIST    Ht:  1.803 m (5' 11\")   Wt:  70.7 kg (155 lb 12.8 oz)   Admission Wt:  77.1 kg (170 lb)    BMI:  21.73 kg/m 2   BSA:  1.88 m 2            ED Clinical Impression     Diagnosis Description Comment Added By Time Added    Weakness [R53.1] Weakness [R53.1]  Hawk Arteaga MD 9/21/2017 10:06 PM    Dehydration [E86.0] Dehydration [E86.0]  Hawk Arteaga MD 9/21/2017 10:06 PM    Congestive heart failure, unspecified congestive heart failure chronicity, unspecified congestive heart failure type (H) [I50.9] Congestive heart failure, unspecified congestive heart failure chronicity, unspecified congestive heart failure type (H) [I50.9]  Hawk Arteaga MD 9/21/2017 10:06 PM      Hospital Problems as of 9/22/2017              Priority Class Noted POA    Diabetes mellitus, type 2 (H) Medium  4/28/2003 Yes    Essential hypertension, benign Low  7/7/2006 Yes    CKD (chronic kidney disease) stage 4, 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) Medium  3/25/2013 Yes    Thrombocytopenia (H) Low  3/25/2013 Yes    Cardiac pacemaker in situ Medium  8/19/2015 Yes    Weakness Medium  8/17/2016 Yes    Fall Medium  9/21/2017 Yes    Acute kidney injury (H) Medium  9/21/2017 Yes    Hypernatremia Medium  9/21/2017 Yes    Coronary artery disease involving native coronary artery - angio in 2006 with multivessel disease managed medically Medium  9/21/2017 Yes    * (Principal)Hypovolemia Medium  9/22/2017 Yes      Non-Hospital Problems as of 9/22/2017              Priority Class Noted    Coronary atherosclerosis Low  7/7/2006    CARDIOVASCULAR " SCREENING; LDL GOAL LESS THAN 100 Medium  10/31/2010    Advanced directives, counseling/discussion Low  2/2/2012    Acute viral bronchitis High  3/25/2013    Pneumonia High  3/25/2013    Cough High  3/25/2013    Acute respiratory failure with hypoxia (H) High  3/25/2013    Bronchitis Medium  3/25/2013    Diabetes type 2, uncontrolled (H) Medium  3/25/2013    Sepsis (H) High  3/27/2013    Elevated lactic acid level Medium  8/17/2016    CKD (chronic kidney disease) stage 3, GFR 30-59 ml/min Medium  8/17/2016      Code Status History     Date Active Date Inactive Code Status Order ID Comments User Context    9/22/2017  1:37 PM  DNR [id number]  Kurt Carrillo MD Outpatient    9/21/2017 11:43 PM 9/22/2017  1:37 PM DNR [id number]  Peter Vann MD Inpatient    8/17/2016 10:21 AM 9/21/2017 11:43 PM DNR [id number]  Kurt Carrillo MD Outpatient    8/17/2016  1:08 AM 8/17/2016 10:21 AM DNR [id number]  Cash Mcnamara MD Inpatient    3/29/2013 10:28 AM 8/17/2016  1:08 AM DNR [id number]  Kurt Carrillo MD Outpatient    3/25/2013  4:11 PM 3/29/2013 10:28 AM DNR [id number]  Joan Hill PA-C Inpatient    3/25/2013  3:28 PM 3/25/2013  4:11 PM Full Code [id number]  Favian Beal RN Inpatient      Current Code Status     Date Active Code Status Order ID Comments User Context       Prior      Summary of Visit     Reason for your hospital stay       Hospitalized with weakness due to dehydration (with high sodium level) from not drinking enough fluids and improved                Medication Review      CONTINUE these medications which have NOT CHANGED        Dose / Directions Comments    acetaminophen 500 MG tablet   Commonly known as:  TYLENOL        1 TABLET THREE TIMES A DAY. DO NOT EXCEED 4000MG OF ACETAMINOPHEN IN A 24 HOUR PERIOD.   Refills:  0        amLODIPine 10 MG tablet   Commonly known as:  NORVASC        1 TABLET DAILY   Refills:  0        aspirin 81 MG tablet        1 TABLET DAILY   Refills:  0         ATORVASTATIN CALCIUM PO        Dose:  20 mg   Take 20 mg by mouth daily   Refills:  0        clopidogrel 75 MG tablet   Commonly known as:  PLAVIX        1 TABLET DAILY   Refills:  0        finasteride 5 MG tablet   Commonly known as:  PROSCAR        1 TABLET DAILY   Quantity:  30   Refills:  0        hypromellose 0.3 % Soln ophthalmic solution   Commonly known as:  GENTEAL        Dose:  1 drop   1 drop 4 times daily   Refills:  0        LANTUS VIAL 100 UNIT/ML injection   Generic drug:  insulin glargine        Dose:  20 Units   Inject 20 Units Subcutaneous every morning   Refills:  0        METOPROLOL SUCCINATE PO        Dose:  50 mg   Take 50 mg by mouth daily.   Refills:  0        nitroGLYcerin 0.4 MG sublingual tablet   Commonly known as:  NITROSTAT        1 TAB EVERY 5 MIN AS NEEDED FOR CHEST PAIN, UP TO 3 PER EPISODE   Refills:  0        omeprazole 20 MG CR capsule   Commonly known as:  priLOSEC        1 CAPSULE DAILY   Refills:  0        * order for DME   Used for:  Low back pain, Fall, initial encounter        Equipment being ordered: 4 wheel walker with brakes and seat   Quantity:  1 Device   Refills:  0        * order for DME   Used for:  Sepsis(995.91), Falls frequently        Equipment being ordered: walker   Quantity:  1 Device   Refills:  0        * order for DME   Used for:  Sepsis(995.91), Falls frequently        Equipment being ordered: transfer bridge   Quantity:  1 Device   Refills:  0        PRESERVISION AREDS 2 PO        Dose:  2 tablet   Take 2 tablets by mouth daily One in AM; one in PM   Refills:  0        RENAL MULTIVITAMIN FORMULA PO        Dose:  1 capsule   Take 1 capsule by mouth every morning   Refills:  0        sertraline 25 MG tablet   Commonly known as:  ZOLOFT        Dose:  25 mg   Take 1 tablet (25 mg) by mouth daily   Quantity:  30 tablet   Refills:  0        TAMSULOSIN HCL CAPS 0.4 MG OR        1 CAPSULE DAILY   Refills:  0        VITAMIN D (CHOLECALCIFEROL) PO        Dose:   "1000 Units   Take 1,000 Units by mouth 2 times daily. Take 2 tablets twice daily for 30 days, then take 1 tablet twice daily.   Refills:  0        * Notice:  This list has 3 medication(s) that are the same as other medications prescribed for you. Read the directions carefully, and ask your doctor or other care provider to review them with you.            After Care     Activity - Up with assistive device           Advance Diet as Tolerated       Follow this diet upon discharge: Orders Placed This Encounter      Moderate Consistent CHO Diet       General info for SNF       Length of Stay Estimate: Short Term Care: Estimated # of Days <30  Condition at Discharge: Stable  Level of care:skilled   Rehabilitation Potential: Fair  Admission H&P remains valid and up-to-date: Yes  Recent Chemotherapy: N/A  Use Nursing Home Standing Orders: Yes       Glucose monitor nursing POCT       Once daily at varying times before meals and at bedtime             Referrals     Physical Therapy Adult Consult       Evaluate and treat as clinically indicated.    Reason:  Weakness, unsteady gait             Follow-Up Appointment Instructions     Follow Up and recommended labs and tests       Follow up with Nursing home provider.  The following labs/tests are recommended: basic metabolic panel within 1 week.             Statement of Approval     Ordered          09/22/17 1337  I have reviewed and agree with all the recommendations and orders detailed in this document.  EFFECTIVE NOW     Approved and electronically signed by:  Kurt Carrillo MD                                                 INTERAGENCY TRANSFER FORM - NURSING   9/21/2017                       [address] MEDICAL SURGICAL: [phone]            Attending Provider: Kurt Carrillo MD     Allergies:  Fluoxetine, Lisinopril    Infection:  None   Service:  HOSPITALIST    Ht:  1.803 m (5' 11\")   Wt:  70.7 kg (155 lb 12.8 oz)   Admission Wt:  77.1 kg (170 lb)    BMI:  " 21.73 kg/m 2   BSA:  1.88 m 2            Advance Directives        Does patient have a scanned Advance Directive/ACP document in EPIC?           Yes        Immunizations     Name Date      Influenza (H1N1) 01/23/10     Influenza (IIV3) 09/28/10     Influenza (IIV3) 09/23/09     Influenza (IIV3) 10/22/04     Influenza (IIV3) 10/09/03     Influenza (IIV3) 11/11/02     Influenza (IIV3) 10/19/01     Influenza (IIV3) 11/06/00     Influenza (IIV3) 10/22/99     Influenza (IIV3) 10/08/98     Influenza (IIV3) 10/21/97     Influenza (IIV3) 10/25/96     Influenza (IIV3) 10/20/95     Influenza (IIV3) 10/20/94     Influenza (IIV3) 10/14/93     Influenza (IIV3) 11/04/92     Pneumococcal 23 valent 11/11/02     Pneumococcal 23 valent 09/16/97     TD (ADULT, 7+) 05/02/06     TD (ADULT, 7+) 09/16/97       ASSESSMENT     Discharge Profile Flowsheet     EXPECTED DISCHARGE     FUNCTIONAL LEVEL CURRENT      Expected Discharge Date  09/22/17 09/22/17 1053   Change in Functional Status Since Onset of Current Illness/Injury  no 09/22/17 0001    DISCHARGE NEEDS ASSESSMENT     COMMUNICATION ASSESSMENT      Readmission Within The Last 30 Days  no previous admission in last 30 days 09/22/17 1053   Patient's communication style  spoken language (English or Bilingual) 09/21/17 1932    Outpatient/Agency/Support Group Needs  skilled nursing facility 09/22/17 1053   SKIN      Anticipated Changes Related to Illness  inability to care for self 09/22/17 1053   Inspection of bony prominences  Full 09/22/17 0950    Equipment Currently Used at Home  grab bar;glucometer;wheelchair, power;shower chair;walker, rolling 09/22/17 0940   Skin WDL  ex 09/22/17 0008    Discharge Facility/Level Of Care Needs  rehabilitation facility 09/22/17 1053   Skin Integrity  blister(s) 09/22/17 0950    Transportation Available  car;family or friend will provide 09/22/17 1053   SAFETY      Current Discharge Risk  lives alone 09/22/17 1054   Safety WDL  WDL 09/22/17 0950     "Equipment Used at Home  -- (has shower chair and front wheeled walker) 03/26/13 1040                      Assessment WDL (Within Defined Limits) Definitions           Safety WDL     Effective: 09/28/15    Row Information: <b>WDL Definition:</b> Bed in low position, wheels locked; call light in reach; upper side rails up x 2; ID band on<br> <font color=\"gray\"><i>Item=AS safety wdl>>List=AS safety wdl>>Version=F14</i></font>      Skin WDL     Effective: 09/28/15    Row Information: <b>WDL Definition:</b> Warm; dry; intact; elastic; without discoloration; pressure points without redness<br> <font color=\"gray\"><i>Item=AS skin wdl>>List=AS skin wdl>>Version=F14</i></font>      Vitals     Vital Signs Flowsheet     VITAL SIGNS     HEIGHT AND WEIGHT      Temp  97  F (36.1  C) 09/22/17 0846   Height  1.803 m (5' 11\") 09/21/17 2352    Temp src  Oral 09/22/17 0507   Height Method  Stated 09/21/17 2352    Resp  16 09/22/17 0846   Height Method  Stated 09/21/17 1937    Pulse  71 09/22/17 0507   Weight  70.7 kg (155 lb 12.8 oz) 09/21/17 2352    Heart Rate  70 09/22/17 0846   Weight Method  Bed scale 09/21/17 2352    Pulse/Heart Rate Source  Monitor 09/22/17 0846   Bed Scale  Other, please comment;Pillow (add comment for number);Fitted sheet;Cover/flat sheet (add comment for number);Bozman (add comment for number);Draw sheet/ pad (add comment for number) (1 pillow,  1) 09/21/17 2352    BP  177/72 09/22/17 0846   BSA (Calculated - sq m)  1.88 09/21/17 2352    OXYGEN THERAPY     BMI (Calculated)  21.78 09/21/17 2352    SpO2  97 % 09/22/17 0846   EKG MONITORING      O2 Device  None (Room air) 09/22/17 0846   Cardiac Regularity  Irregular 09/21/17 2203    PAIN/COMFORT     Cardiac Rhythm  -- (first degree AV block) 09/21/17 2203    Patient Currently in Pain  denies 09/22/17 0846   DAILY CARE      0-10 Pain Scale  4 09/21/17 1937   Activity Management  activity encouraged 09/22/17 0950            Patient " Lines/Drains/Airways Status    Active LINES/DRAINS/AIRWAYS     Name: Placement date: Placement time: Site: Days: Last dressing change:    Peripheral IV 09/21/17 Right Upper forearm 09/21/17 1957   Upper forearm   less than 1             Patient Lines/Drains/Airways Status    Active PICC/CVC     None            Intake/Output Detail Report     Date Intake     Output Net    Shift P.O. I.V. IV Piggyback Total Urine Total       Noc 09/20/17 2300 - 09/21/17 0659 -- -- -- -- -- -- 0    Day 09/21/17 0700 - 09/21/17 1459 -- -- -- -- -- -- 0    Sobia 09/21/17 1500 - 09/21/17 2259 -- -- -- -- -- -- 0    Noc 09/21/17 2300 - 09/22/17 0659 --      Day 09/22/17 0700 - 09/22/17 1459 360 -- -- 360 550 550 -190      Case Management/Discharge Planning     Case Management/Discharge Planning Flowsheet     REFERRAL INFORMATION     COPING/STRESS      Admission Type  observation 09/22/17 1053   Major Change/Loss/Stressor  medical condition/diagnosis 08/17/16 0024    Arrived From  home healthcare service 09/22/17 1053   EXPECTED DISCHARGE      Referral Source  physician 09/22/17 1053   Expected Discharge Date  09/22/17 09/22/17 1053    Reason For Consult  discharge planning;facility placement 09/22/17 1053   DISCHARGE PLANNING      CTS Assigned to Case  Maria Alejandra 09/22/17 1053   Readmission Within The Last 30 Days  no previous admission in last 30 days 09/22/17 1053    Primary Care Clinic Name  [facility] 09/22/17 1053   Anticipated Changes Related to Illness  inability to care for self 09/22/17 1053    Primary Care MD Name  Brent 09/22/17 1053   Transportation Available  car;family or friend will provide 09/22/17 1053    LIVING ENVIRONMENT     Current Discharge Risk  lives alone 09/22/17 1054    Lives With  alone 09/22/17 1053   Discharge Facility/Level Of Care Needs  rehabilitation facility 09/22/17 1053    Living Arrangements  apartment 09/22/17 1053   Skilled Nursing Facility  [facility] 03/29/13 1055     Provides Primary Care For  no one, unable/limited ability to care for self 09/22/17 1053   Outpatient/Agency/Support Group Needs  skilled nursing facility 09/22/17 1053    Primary Care Provided By  child(joseph) (specify) (Son Panchito) 09/22/17 1053   Equipment Used at Home  -- (has shower chair and front wheeled walker) 03/26/13 1040    Quality Of Family Relationships  supportive;involved 09/22/17 1053   Discharge Plan Concerns  no concerns 09/22/17 0006    Able to Return to Prior Living Arrangements  other (see comments) (Therapy recommends TCU) 09/22/17 1053   FINAL RESOURCES      HOME SAFETY     Equipment Currently Used at Home  grab bar;glucometer;wheelchair, power;shower chair;walker, rolling 09/22/17 0940    Patient Feels Safe Living in Home?  yes 09/22/17 1053   ABUSE RISK SCREEN      ASSESSMENT OF FAMILY/SOCIAL SUPPORT     QUESTION TO PATIENT:  Has a member of your family or a partner(now or in the past) intimidated, hurt, manipulated, or controlled you in any way?  no 09/22/17 0002    Marital Status   09/22/17 1053   QUESTION TO PATIENT: Do you feel safe going back to the place where you are living?  yes 09/22/17 0002    Who is your support system?  Children 09/22/17 1053   OBSERVATION: Is there reason to believe there has been maltreatment of a vulnerable adult (ie. Physical/Sexual/Emotional abuse, self neglect, lack of adequate food, shelter, medical care, or financial exploitation)?  no 09/22/17 0002    Description of Support System  Supportive;Involved 09/22/17 1053   HOMICIDE RISK      Support Assessment  Adequate family and caregiver support 09/22/17 1053   Feels Like Hurting Others  no 09/21/17 1940    Quality of Family Relationships  supportive;involved 09/22/17 1053                       [address] SURGICAL: [phone]            Medication Administration Report for Shaun Gotti as of 09/22/17 1353   Legend:    Given Hold Not Given Due Canceled Entry Other Actions     Time Time (Time) Time  Time-Action       Inactive    Active    Linked        Medications 09/16/17 09/17/17 09/18/17 09/19/17 09/20/17 09/21/17 09/22/17    0.45% sodium chloride infusion  Rate: 125 mL/hr Freq: CONTINUOUS Route: IV  Start: 09/22/17 0000          0032 ( )-New Bag       0518 ( )-New Bag       1323 ( )-New Bag           amLODIPine (NORVASC) tablet 10 mg  Dose: 10 mg Freq: DAILY Route: PO  Start: 09/22/17 0900          0842 (10 mg)-Given           aspirin EC EC tablet 81 mg  Dose: 81 mg Freq: DAILY Route: PO  Start: 09/22/17 0900          0842 (81 mg)-Given           clopidogrel (PLAVIX) tablet 75 mg  Dose: 75 mg Freq: DAILY Route: PO  Start: 09/22/17 0900          0842 (75 mg)-Given           finasteride (PROSCAR) tablet 5 mg  Dose: 5 mg Freq: DAILY Route: PO  Start: 09/22/17 0900   Admin Instructions: *Do not handle tablets if you are pregnant*           0842 (5 mg)-Given           glucose 40 % gel 15-30 g  Dose: 15-30 g Freq: EVERY 15 MIN PRN Route: PO  PRN Reason: low blood sugar  Start: 09/21/17 2343   Admin Instructions: Give 15 g for BG 51 to 69 mg/dL IF patient is conscious and able to swallow. Give 30 g for BG less than or equal to 50 mg/dL IF patient is conscious and able to swallow. Do NOT give glucose gel via enteral tube.  IF patient has enteral tube: give apple juice 120 mL (4 oz or 15 g of CHO) via enteral tube for BG 51 to 69 mg/dL.  Give apple juice 240 mL (8 oz or 30 g of CHO) via enteral tube for BG less than or equal to 50 mg/dL.    ~Oral gel is preferable for conscious and able to swallow patient.   ~IF gel unavailable or patient refuses may provide apple juice 120 mL (4 oz or 15 g of CHO). Document juice on I and O flowsheet.              Or  dextrose 50 % injection 25-50 mL  Dose: 25-50 mL Freq: EVERY 15 MIN PRN Route: IV  PRN Reason: low blood sugar  Start: 09/21/17 2343   Admin Instructions: Use if have IV access, BG less than 70 mg/dL and meet dose criteria below:  Dose if  conscious and alert (or disorientated) and NPO = 25 mL  Dose if unconscious / not alert = 50 mL  Vesicant.              Or  glucagon injection 1 mg  Dose: 1 mg Freq: EVERY 15 MIN PRN Route: SC  PRN Reason: low blood sugar  PRN Comment: May repeat x 1 only  Start: 09/21/17 2343   Admin Instructions: May give SQ or IM. ONLY use glucagon IF patient has NO IV access AND is UNABLE to swallow AND blood glucose is LESS than or EQUAL to 50 mg/dL.               insulin glargine (LANTUS) injection 20 Units  Dose: 20 Units Freq: EVERY MORNING BEFORE BREAKFAST Route: SC  Start: 09/22/17 0730   Admin Instructions: *Not for IV use, SQ only.  Do not mix with other insulins*           0841 (20 Units)-Given           metoprolol (TOPROL-XL) 24 hr tablet 50 mg  Dose: 50 mg Freq: DAILY Route: PO  Start: 09/22/17 0900          0842 (50 mg)-Given           naloxone (NARCAN) injection 0.1-0.4 mg  Dose: 0.1-0.4 mg Freq: EVERY 2 MIN PRN Route: IV  PRN Reason: opioid reversal  Start: 09/21/17 2343   Admin Instructions: For respiratory rate LESS than or EQUAL to 8.  Partial reversal dose:  0.1 mg titrated q 2 minutes for Analgesia Side Effects Monitoring Sedation Level of 3 (frequently drowsy, arousable, drifts to sleep during conversation).Full reversal dose:  0.4 mg bolus for Analgesia Side Effects Monitoring Sedation Level of 4 (somnolent, minimal or no response to stimulation).               omeprazole (priLOSEC) CR capsule 20 mg  Dose: 20 mg Freq: EVERY MORNING BEFORE BREAKFAST Route: PO  Start: 09/22/17 0730          0842 (20 mg)-Given           ondansetron (ZOFRAN-ODT) ODT tab 4 mg  Dose: 4 mg Freq: EVERY 6 HOURS PRN Route: PO  PRN Reasons: nausea,vomiting  Start: 09/21/17 2343   Admin Instructions: This is Step 1 of nausea and vomiting management.  If nausea not resolved in 15 minutes, go to Step 2 prochlorperazine (COMPAZINE). Do not push through foil backing. Peel back foil and gently remove. Place on tongue immediately.  Administration with liquid unnecessary              Or  ondansetron (ZOFRAN) injection 4 mg  Dose: 4 mg Freq: EVERY 6 HOURS PRN Route: IV  PRN Reasons: nausea,vomiting  Start: 09/21/17 2343   Admin Instructions: This is Step 1 of nausea and vomiting management.  If nausea not resolved in 15 minutes, go to Step 2 prochlorperazine (COMPAZINE).  Irritant.              Completed Medications  Medications 09/16/17 09/17/17 09/18/17 09/19/17 09/20/17 09/21/17 09/22/17         Dose: 500 mL Freq: ONCE Route: IV  Last Dose: Stopped (09/22/17 0031)  Start: 09/21/17 1953   End: 09/22/17 0031 2103 (500 mL)-New Bag [C]        0031-Stopped             Dose: 500 mL Freq: ONCE Route: IV  Last Dose: Stopped (09/22/17 0150)  Start: 09/22/17 0100   End: 09/22/17 0150          0050 (500 mL)-New Bag       0150-Stopped          Discontinued Medications  Medications 09/16/17 09/17/17 09/18/17 09/19/17 09/20/17 09/21/17 09/22/17         Rate: 125 mL/hr Freq: CONTINUOUS Route: IV  Start: 09/21/17 1954   End: 09/21/17 2355   Admin Instructions: Administer after the bolus.                 2355-Med Discontinued          Rate: 125 mL/hr Freq: CONTINUOUS Route: IV  Start: 09/21/17 2345   End: 09/21/17 2355                2355-Med Discontinued          Freq: ONCE Route: IV  Start: 09/22/17 0030   End: 09/22/17 0046                 0046-Med Discontinued                INTERAGENCY TRANSFER FORM - NOTES (H&P, Discharge Summary, Consults, Procedures, Therapies)   9/21/2017                       [address] SURGICAL: [phone]               History & Physicals      H&P by Peter Vann MD at 9/21/2017 10:36 PM     Author:  Peter Vann MD Service:  Hospitalist Author Type:  Physician    Filed:  9/21/2017 10:46 PM Date of Service:  9/21/2017 10:36 PM Creation Time:  9/21/2017 10:36 PM    Status:  Signed :  Peter Vann MD (Physician)         [facility]    History and Physical  Hospitalist       Date of Admission:  9/21/2017  Date of Service (when I saw the patient):[DF1.1] 09/21/17    Assessment & Plan[DF1.2]       Active Problems:    Weakness    Assessment: likely secondary to volume depletion. He admittedly does not drink much water and apparently was just recently taken off of a diuretic.  He has an associated MARQUIS with hypernatremia and appears very dry on exam.  He had a similar episode one year ago and responded dramatically to fluids overnight.  He has not had any CP, SOB, focal neurologic symptoms to suggest cardiac or neurologic etiologies.  He has only mild anemia that is chronic and stable.      Plan: register to observation, IV fluids, PT evaluation tomorrow morning, follow intake and output closely and recheck basic profile tomorrow morning      Fall    Assessment: due to his weakness. 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)    Assessment:[DF1.1] Creatinine higher than normal related to MARQUIS[DF1.3]    Plan:[DF1.1] see below[DF1.3]      Acute kidney injury (H)    Assessment:[DF1.1] likely secondary to volume depetion[DF1.3]    Plan:[DF1.1] IV fluids, follow intake and output and recheck basic profile tomorrow morning[DF1.3]      Hypernatremia    Assessment:[DF1.1] appears dehydrated[DF1.3]    Plan:[DF1.1] 1/2 NS and recheck tomorrow morning[DF1.3]      Coronary artery disease involving native coronary artery - angio in 2006 with multivessel disease managed medically    Assessment:[DF1.1] no CP or SOB with normal troponin and EKG without acute ischemic changes[DF1.3]    Plan:[DF1.1] no acute intervention[DF1.3]      Essential hypertension, benign    Assessment:[DF1.1] chronic[DF1.3]     Plan:[DF1.1] continue home medications[DF1.3]      Thrombocytopenia (H)    Assessment:[DF1.1] chronic and appears stable[DF1.3]    Plan:[DF1.1] no need to follow[DF1.3]      Cardiac pacemaker in situ    Assessment:[DF1.1] noted past history[DF1.3]    Plan:[DF1.1] no acute intervention[DF1.3]    DVT Prophylaxis:[DF1.1] anticipate less than 24 hour stay[DF1.3]  Code Status:[DF1.1] DNR[DF1.3]    Disposition: Expected discharge in[DF1.1] 1[DF1.3] days once[DF1.1] PT eval completed and plan in place for safe disposition and creatinine and sodium improving[DF1.3].[DF1.1]    Peter Vann MD    Primary Care Physician   Sony Berkowitz    Chief Complaint[DF1.2]   Weak    History is obtained from the patient[DF1.3]    History of Present Illness[DF1.2]   Shaun Gotti is a 89 year old male who presents with[DF1.1] weakness. He started to feel somewhat weak yesterday.  Today however his legs got so weak he fell to the ground. He thinks he might have caught the edge of a counter top on the way down but did not sustain any injuries.  He did not lose consciousness.  He tried getting up but his legs were too weak so he was brought to the ED. He did not have any focal weakness, changes in speech, CP or SOB.  He admits he doesn't drink much water.  He denies excessive urination and denies diarrhea or vomiting.  He was recently taken off of a diuretic but details as to why are not known.[DF1.3]     Past Medical History[DF1.2]    I have reviewed this patient's medical history and updated it with pertinent information if needed.[DF1.1]   Past Medical History:   Diagnosis Date     Bronchitis, not specified as acute or chronic 09/26/10    Admitted. Lower respiratory infection, severe weakness.     Chronic kidney disease (CKD)      Coronary atherosclerosis of native coronary artery 1/22/2010    Admitted. Syncopal spell and substernal chest pain.     Coronary atherosclerosis of unspecified type of vessel, native or graft       Hernia of unspecified site of abdominal cavity without mention of obstruction or gangrene     left inguinal hernia     Other and unspecified hyperlipidemia      Prostatitis, unspecified     Chronic prostatitis     Type II or unspecified type diabetes mellitus without mention of complication, not stated as uncontrolled      Unspecified essential hypertension        Past Surgical History[DF1.2]   I have reviewed this patient's surgical history and updated it with pertinent information if needed.[DF1.1]  Past Surgical History:   Procedure Laterality Date     CARDIAC CATHERIZATION  10/23/2006    [facility]     HC REMOVAL GALLBLADDER      Cholecystectomy     HC S&I FLUORO PACEMAKER       HERNIA REPAIR         Prior to Admission Medications   Prior to Admission Medications   Prescriptions Last Dose Informant Patient Reported? Taking?   AMLODIPINE BESYLATE 10 MG OR TABS 2017 at 1000  Yes Yes   Si TABLET DAILY   ASPIRIN 81 MG OR TABS 2017 at 0800  Yes Yes   Si TABLET DAILY   ATORVASTATIN CALCIUM PO 2017 at 0800  Yes Yes   Sig: Take 20 mg by mouth daily   B Complex-C-Folic Acid (RENAL MULTIVITAMIN FORMULA PO) 2017 at 0800  Yes Yes   Sig: Take 1 capsule by mouth every morning   CLOPIDOGREL BISULFATE 75 MG OR TABS 2017 at 0800  No Yes   Si TABLET DAILY   FINASTERIDE 5 MG OR TABS 2017 at 0800  No Yes   Si TABLET DAILY   METOPROLOL SUCCINATE PO 2017 at 0800  Yes Yes   Sig: Take 50 mg by mouth daily.   Multiple Vitamins-Minerals (PRESERVISION AREDS 2 PO) 2017 at 0800  Yes Yes   Sig: Take 2 tablets by mouth daily One in AM; one in PM   NITROGLYCERIN 0.4 MG SL SUBL Unknown at Unknown time  Yes No   Si TAB EVERY 5 MIN AS NEEDED FOR CHEST PAIN, UP TO 3 PER EPISODE   OMEPRAZOLE 20 MG OR CPDR 2017 at 0800  Yes Yes   Si CAPSULE DAILY   ORDER FOR DME 2017 at Unknown time  No Yes   Sig: Equipment being ordered: 4 wheel walker with brakes and seat   ORDER FOR  DME 2017 at Unknown time  No Yes   Sig: Equipment being ordered: walker   ORDER FOR DME 2017 at Unknown time  No Yes   Sig: Equipment being ordered: transfer bridge   TAMSULOSIN HCL CAPS 0.4 MG OR 2017 at 0800  Yes Yes   Si CAPSULE DAILY   VITAMIN D, CHOLECALCIFEROL, PO 2017 at 0800  Yes Yes   Sig: Take 1,000 Units by mouth 2 times daily. Take 2 tablets twice daily for 30 days, then take 1 tablet twice daily.   acetaminophen (TYLENOL) 500 MG tablet 2017 at 1200  Yes Yes   Si TABLET THREE TIMES A DAY. DO NOT EXCEED 4000MG OF ACETAMINOPHEN IN A 24 HOUR PERIOD.   hypromellose (GENTEAL) 0.3 % SOLN 2017 at 1000  Yes Yes   Si drop 4 times daily   insulin glargine (LANTUS VIAL) 100 UNIT/ML injection 2017 at 1000  Yes Yes   Sig: Inject 20 Units Subcutaneous every morning    sertraline (ZOLOFT) 25 MG tablet 2017 at 0800  Yes Yes   Sig: Take 1 tablet (25 mg) by mouth daily      Facility-Administered Medications: None     Allergies   Allergies   Allergen Reactions     Fluoxetine      Lisinopril        Social History[DF1.2]   I have reviewed this patient's social history and updated it with pertinent information if needed. Shaun Gotti[DF1.1]  reports that he has never smoked. He has never used smokeless tobacco. He reports that he does not drink alcohol or use illicit drugs.    Family History[DF1.2]   I have reviewed this patient's family history and updated it with pertinent information if needed.[DF1.1]   Family History   Problem Relation Age of Onset     CANCER Father      prostate cancer       Review of Systems[DF1.2]   The 10 point Review of Systems is negative other than noted in the HPI or here.[DF1.3]     Physical Exam   Temp: 99.3  F (37.4  C) Temp src: Oral BP: (!) 173/91   Heart Rate: 81   SpO2: 98 % O2 Device: None (Room air)[DF1.2]    Vital Signs with Ranges[DF1.1]  Temp:  [99.3  F (37.4  C)] 99.3  F (37.4  C)  Heart Rate:  [81] 81  BP: (173)/(91)  173/91  SpO2:  [98 %] 98 %  170 lbs 0 oz[DF1.2]    Constitutional:   awake, alert, cooperative, no apparent distress, and appears stated age     Eyes:   Lids and lashes normal, pupils equal, round and reactive to light, extra ocular muscles intact, sclera clear, conjunctiva normal     ENT:   Normocephalic, without obvious abnormality, atraumatic, sinuses nontender on palpation, external ears without lesions, oral pharynx with very dry mucous membranes, tonsils without erythema or exudates, gums normal and good dentition.     Neck:   Supple, symmetrical, trachea midline, no adenopathy, thyroid symmetric, not enlarged and no tenderness, skin normal     Hematologic / Lymphatic:   no cervical lymphadenopathy and no supraclavicular lymphadenopathy     Back:   Symmetric, no curvature, spinous processes are non-tender on palpation, paraspinous muscles are non-tender on palpation, no costal vertebral tenderness     Lungs:   No increased work of breathing, good air exchange, clear to auscultation bilaterally, no crackles or wheezing     Cardiovascular:   Normal apical impulse, regular rate and rhythm, normal S1 and S2, no S3 or S4, and no murmur noted     Abdomen:   normal bowel sounds, soft, non-distended, non-tender, no masses palpated, no hepatosplenomegally     Neurologic:   Awake, alert, oriented to name, place and time.  Cranial nerves II-XII are grossly intact.  Motor is 4 out of 5 bilaterally to  strength and dorsi and plantar flexion of the feet.   Sensory is intact.[DF1.3]        Data[DF1.2]   Data reviewed today:  I personally reviewed[DF1.1] the EKG tracing showing NSR with first degree AV block but no acute ischemic changes[DF1.3].[DF1.1]    Recent Labs  Lab 09/21/17 2003   WBC 5.9   HGB 11.4*   MCV 94   *   *   POTASSIUM 4.0   CHLORIDE 102   CO2 25   BUN 49*   CR 2.75*   ANIONGAP 31*   RAHEL 9.3   *   ALBUMIN 3.6   PROTTOTAL 7.2   BILITOTAL 0.6   ALKPHOS 93   ALT 18   AST 12   TROPI 0.015        Recent Results (from the past 24 hour(s))   XR Chest Port 1 View    Narrative    CHEST PORTABLE ONE VIEW  9/21/2017 9:17 PM     COMPARISON: Frontal chest x-ray 8/16/2016.    HISTORY: Weakness, elevated BNP.    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    The cardiac silhouette, pulmonary vasculature, lungs and pleural  spaces are within normal limits.      Impression    IMPRESSION: Clear lungs.    EUGENIE AGGARWAL MD[DF1.2]          Revision History        User Key Date/Time User Provider Type Action    > DF1.3 9/21/2017 10:46 PM Peter Vann MD Physician Sign     DF1.2 9/21/2017 10:37 PM Peter Vann MD Physician      DF1.1 9/21/2017 10:36 PM Peter Vann MD Physician                   Discharge Summaries     No notes of this type exist for this encounter.         Consult Notes      Consults by Maria Alejandra Leong RN at 9/22/2017 10:56 AM     Author:  Salo, Maria Alejandra, RN Service:  General Medicine Author Type:      Filed:  9/22/2017  1:10 PM Date of Service:  9/22/2017 10:56 AM Creation Time:  9/22/2017 10:56 AM    Status:  Addendum :  Maria Alejandra Leong RN ()     Consult Orders:    1. Social Work IP Consult [[id number]] ordered by Kurt Carrillo MD at 09/22/17 0840                Care Transition Initial Assessment - RN  Reason For Consult: discharge planning, facility placement   Met with: Patient and Family.    DATA[SN1.1]   Active Problems:    Diabetes mellitus, type 2 (H)    Essential hypertension, benign    CKD (chronic kidney disease) stage 4, 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)    Thrombocytopenia (H)    Cardiac pacemaker in situ    Weakness    Fall    Acute kidney injury (H)    Hypernatremia    Coronary artery disease involving native coronary artery - angio in 2006 with multivessel disease managed medically[SN1.2]       Primary Care Clinic  Name: [facility]  Primary Care MD Name: Brent  Contact information and PCP information verified: Yes      ASSESSMENT  Cognitive Status: awake, alert and oriented. Very [place] talked with patient while son was present             Lives With: alone  Living Arrangements: apartment  Quality Of Family Relationships: supportive, involved  Description of Support System: Supportive, Involved   Who is your support system?: Children   Support Assessment: Adequate family and caregiver support   Insurance Concerns: No Insurance issues identified          This writer met with pt and family , introduced self and role. PT evaluate patient and recommended some rehab patient lives alone shad Flanagan stays with him for part of the day and has stayed 24/7 when needed Rachelle Mckeon [facility] [phone][SN1.3] has an RN come every other week and a HHA 1X a week for personal cares and cleaning. Patient goes to [employer] dining for lunch and eats the other 2 meals in his apartment. Patient is legally blind and [place] son Panchito said it would be nice to get him back to his apartment where he knows where everything is but he needs to be able to walk. Patient has VA coverage for TCU awaiting a call from Juan aragon  for the [phone]. Writer left message for Joan [phone] at the [facility] to check bed availability.      PLAN    TCU placement CTS to continue to follow      Maria Alejandra Leong CTS RN [facility] [phone] [facility] [phone][SN1.1]    Discharge Planner[SN1.2]   Discharge Plans in progress: TBA   Barriers to discharge plan: bed availability  Follow up plan: Patient will follow up with primary provider after discharge       Entered by:[SN1.1] Maria Alejandra Leong 09/22/2017 10:58 AM[SN1.2]            Revision History        User Key Date/Time User Provider Type Action    > SN1.3 9/22/2017  1:10 PM Maria Alejandra Leong RN Case Manager Addend     SN1.2 9/22/2017 11:10 AM Maria Alejandra Leong RN Case Manager Sign     SN1.1 9/22/2017 10:56  "AM Maria Alejandra Leong, KASEY Case Manager                      Progress Notes - Physician (Notes for yesterday and today)      ED Provider Notes by Hawk Arteaga MD at 9/21/2017  7:29 PM     Author:  Hawk Arteaga MD Service:  Emergency Medicine Author Type:  Physician    Filed:  9/21/2017 10:06 PM Date of Service:  9/21/2017  7:29 PM Creation Time:  9/21/2017  7:54 PM    Status:  Signed :  Hawk Arteaga MD (Physician)           History   No chief complaint on file.    HPI  Shaun Gotti is a 89 year old male who presents with some weakness.  Patient was sitting outside for a while and then start to come inside.  He was stuck at the front door because this was a controlled locked door.  Once he got any start to feel somewhat weak and was able to make to his room and he laid down for an hour.  When he got up you went to the kitchen to try make some food but then his walker slipped in his legs gave out he went down to the ground.  He denies any injury or pain.  He was too weak though to get up and called EMS.  Patient has had episodes like this before but usually after an hour or so the weakness resolved this did not seem like it was.  He denies any recent nausea vomiting or diarrhea.  Denies any recent chest pain.  Denies a headache.  Patience is been eating and drinking normally.    I have reviewed the Medications, Allergies, Past Medical and Surgical History, and Social History in the Epic system.        Review of Systems   All other systems reviewed and are negative.      Physical Exam   BP: (!) 173/91  Heart Rate: 81  Temp: 99.3  F (37.4  C)  Height: 177.8 cm (5' 10\")  Weight: 77.1 kg (170 lb)  SpO2: 98 %  Physical Exam   Constitutional: He appears well-developed and well-nourished. No distress.   HENT:   Head: Normocephalic and atraumatic.   Mouth/Throat: Oropharynx is clear and moist. No oropharyngeal exudate.   Eyes: Conjunctivae are normal.   Neck: Normal range of motion. Neck " supple. No thyromegaly present.   Cardiovascular: Normal rate, regular rhythm and normal heart sounds.    Pulmonary/Chest: Effort normal and breath sounds normal. No respiratory distress. He has no wheezes.   Abdominal: Soft. Bowel sounds are normal. He exhibits no distension. There is no tenderness.   Skin: Skin is warm and dry. No rash noted. He is not diaphoretic.   Nursing note and vitals reviewed.      ED Course     ED Course     Procedures[BW1.1]         Results for orders placed or performed during the hospital encounter of 09/21/17   XR Chest Port 1 View    Narrative    CHEST PORTABLE ONE VIEW  9/21/2017 9:17 PM     COMPARISON: Frontal chest x-ray 8/16/2016.    HISTORY: Weakness, elevated BNP.    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    The cardiac silhouette, pulmonary vasculature, lungs and pleural  spaces are within normal limits.      Impression    IMPRESSION: Clear lungs.    EUGENIE AGGARWAL MD   CBC with platelets differential   Result Value Ref Range    WBC 5.9 4.0 - 11.0 10e9/L    RBC Count 3.77 (L) 4.4 - 5.9 10e12/L    Hemoglobin 11.4 (L) 13.3 - 17.7 g/dL    Hematocrit 35.4 (L) 40.0 - 53.0 %    MCV 94 78 - 100 fl    MCH 30.2 26.5 - 33.0 pg    MCHC 32.2 31.5 - 36.5 g/dL    RDW 13.5 10.0 - 15.0 %    Platelet Count 132 (L) 150 - 450 10e9/L    Diff Method Automated Method     % Neutrophils 75.9 %    % Lymphocytes 9.0 %    % Monocytes 12.5 %    % Eosinophils 2.4 %    % Basophils 0.2 %    % Immature Granulocytes 0.0 %    Absolute Neutrophil 4.5 1.6 - 8.3 10e9/L    Absolute Lymphocytes 0.5 (L) 0.8 - 5.3 10e9/L    Absolute Monocytes 0.7 0.0 - 1.3 10e9/L    Absolute Eosinophils 0.1 0.0 - 0.7 10e9/L    Absolute Basophils 0.0 0.0 - 0.2 10e9/L    Abs Immature Granulocytes 0.0 0 - 0.4 10e9/L   Comprehensive metabolic panel   Result Value Ref Range    Sodium 158 (H) 133 - 144 mmol/L     Potassium 4.0 3.4 - 5.3 mmol/L    Chloride 102 94 - 109 mmol/L    Carbon Dioxide 25 20 - 32 mmol/L    Anion Gap 31 (H) 3 - 14 mmol/L    Glucose 155 (H) 70 - 99 mg/dL    Urea Nitrogen 49 (H) 7 - 30 mg/dL    Creatinine 2.75 (H) 0.66 - 1.25 mg/dL    GFR Estimate 22 (L) >60 mL/min/1.7m2    GFR Estimate If Black 26 (L) >60 mL/min/1.7m2    Calcium 9.3 8.5 - 10.1 mg/dL    Bilirubin Total 0.6 0.2 - 1.3 mg/dL    Albumin 3.6 3.4 - 5.0 g/dL    Protein Total 7.2 6.8 - 8.8 g/dL    Alkaline Phosphatase 93 40 - 150 U/L    ALT 18 0 - 70 U/L    AST 12 0 - 45 U/L   Troponin I   Result Value Ref Range    Troponin I ES 0.015 0.000 - 0.045 ug/L   Nt probnp inpatient (BNP)   Result Value Ref Range    N-Terminal Pro BNP Inpatient 3344 (H) 0 - 1800 pg/mL   UA with Microscopic   Result Value Ref Range    Color Urine Yellow     Appearance Urine Clear     Glucose Urine 50 (A) NEG^Negative mg/dL    Bilirubin Urine Negative NEG^Negative    Ketones Urine Negative NEG^Negative mg/dL    Specific Gravity Urine 1.014 1.003 - 1.035    Blood Urine Negative NEG^Negative    pH Urine 5.0 5.0 - 7.0 pH    Protein Albumin Urine 100 (A) NEG^Negative mg/dL    Urobilinogen mg/dL 0.0 0.0 - 2.0 mg/dL    Nitrite Urine Negative NEG^Negative    Leukocyte Esterase Urine Negative NEG^Negative    Source Unspecified Urine     WBC Urine 1 0 - 2 /HPF    RBC Urine 4 (H) 0 - 2 /HPF    Squamous Epithelial /HPF Urine <1 0 - 1 /HPF    Mucous Urine Present (A) NEG^Negative /LPF    Hyaline Casts 1 0 - 2 /LPF     Medications   0.9% sodium chloride BOLUS (500 mLs Intravenous New Bag 9/21/17 1813)     Followed by   0.9% sodium chloride infusion (not administered)[BW1.2]     labs are reviewed and patient did have an elevated BNP and his BUN creatinine seem a little bit worse than normal.  I think patient is somewhat dry witches may be contributing to his weakness.  Because BNP is elevated, I don't want to be too aggressive with fluids so I think bringing the patient and for  observation overnight, gentle fluid hydration and reassessment in the morning would be the most prudent option.  I discussed the case with the hospital is and they also agree with this plan.  Talk with family further, it looks like he was on a diuretic recently but somehow this accidentally got discontinued.  This could be one reason why is BNP is creeping up also.[BW1.3]    Assessments & Plan (with Medical Decision Making)[BW1.1]  weakness, dehydration, CHF[BW1.3]      I have reviewed the nursing notes.    I have reviewed the findings, diagnosis, plan and need for follow up with the patient.        9/21/2017   [facility] EMERGENCY DEPARTMENT[BW1.1]     Hawk Arteaga MD  09/21/17 2206  [BW1.4]     Revision History        User Key Date/Time User Provider Type Action    > BW1.4 9/21/2017 10:06 PM Hawk Arteaga MD Physician Sign     BW1.2 9/21/2017 10:05 PM Hawk Arteaga MD Physician      BW1.3 9/21/2017 10:04 PM Hawk Arteaga MD Physician      BW1.1 9/21/2017  7:54 PM Hawk Arteaga MD Physician                   Procedure Notes     No notes of this type exist for this encounter.         Progress Notes - Therapies (Notes from 09/19/17 through 09/22/17)      Progress Notes by Yariel Woodward PT at 9/22/2017  1:32 PM     Author:  Yariel Woodward PT Service:  (none) Author Type:  Physical Therapist    Filed:  9/22/2017  1:32 PM Date of Service:  9/22/2017  1:32 PM Creation Time:  9/22/2017  1:32 PM    Status:  Signed :  Yariel Woodward PT (Physical Therapist)          09/22/17 0915   Quick Adds   Type of Visit Initial PT Evaluation   Living Environment   Lives With alone   Living Arrangements apartment   Home Accessibility bed and bath on same level   Number of Stairs to Enter Home 0   Number of Stairs Within Home 0   Stair Railings at Home none   Transportation Available car;family or friend will provide   Living Environment Comment Pt lives alone in an  apartment, which has multiple floors, however he does not have any stairs to perform. There is an elevator present. All of his needs including bathroom and bedroom are on one floor.    Self-Care   Usual Activity Tolerance fair   Current Activity Tolerance poor   Regular Exercise no   Equipment Currently Used at Home grab bar;glucometer;wheelchair, power;shower chair;walker, rolling   Activity/Exercise/Self-Care Comment Uses 4WW all of the time when in the house, but occasionally does use his power scooter in the house and when going longer distances.    Functional Level Prior   Ambulation 1-->assistive equipment   Transferring 1-->assistive equipment   Toileting 0-->independent   Bathing 2-->assistive person   Dressing 0-->independent   Eating 0-->independent   Communication 0-->understands/communicates without difficulty   Swallowing 0-->swallows foods/liquids without difficulty   Cognition 0 - no cognition issues reported   Fall history within last six months yes   Number of times patient has fallen within last six months 6   Which of the above functional risks had a recent onset or change? ambulation;transferring;toileting   Prior Functional Level Comment son Panchito comes daily at 8-12 pm and stays even longer on weekends. Has a HHA come 1x per week and clean apt and help with a bath. RN comes every other week to check in with his health and help upkeep diabetic foot health.    General Information   Onset of Illness/Injury or Date of Surgery - Date 09/21/17   Referring Physician Dr. Vann   Patient/Family Goals Statement Wants to have more energy, go home at some point   Pertinent History of Current Problem (include personal factors and/or comorbidities that impact the POC) Shaun is a pleasant 89 y/o male admitted to the hospital under Obs status via the ED after a fall at home and being unable to get back up, requiring to call 911. He had an episode yesterday of acute onset weakness after sitting outside on his  "patio, and fell when his walker got away from him. He lives alone, but gets some help with cleaning the house, bathing, and home nursing weekly. PMH significant for MARQUIS, Stage IV CKD, CAD, Hypernatremia, HTN, falls, DM II, Pacemaker. He has had several of these episodes in the past, however none this in depth. Previous episodes responded well to rehydration, per chart review. He does have a son who is willing to stay 24/7 with the pt if this makes it able for him to return home.    Precautions/Limitations fall precautions   Weight-Bearing Status - LUE full weight-bearing   Weight-Bearing Status - RUE full weight-bearing   Weight-Bearing Status - LLE full weight-bearing   Weight-Bearing Status - RLE full weight-bearing   Heart Disease Risk Factors Medical history;Lack of physical activity;High blood pressure;Diabetes   General Observations Cold \"all of the time\", Very fatigued lately and sleeping much more than usual   Cognitive Status Examination   Orientation orientation to person, place and time   Pain Assessment   Patient Currently in Pain No   Integumentary/Edema   Integumentary/Edema no deficits were identifed   Posture    Posture Forward head position;Protracted shoulders   Range of Motion (ROM)   ROM Comment UEs limited overhead by age and strength-related deficits, mild. LEs limited grossly by acute weakness actively, but no gross joint mobility deficits.    Strength   Strength Comments Generalized weakness throughout body LEs > UEs; Quads, DFs, PFs 2/5, bilaterally   Bed Mobility   Bed Mobility Comments Unable to assess, as pt was in chair throughout session and declined to return to bed   Transfer Skills   Transfer Comments Sit <> stand with Total assist, 2-people to perform stand pivot transfer from chair <> bed   Gait   Gait Comments Not appropriate for ambulation yet, as cannot bear weight independently   Balance   Balance Comments Fair sitting balance, able to generate weight shifts in multiple " "directions. Standing balance poor, LEs unable to bear weight, unable to truly assess   Sensory Examination   Sensory Perception no deficits were identified   Coordination   Coordination no deficits were identified   Muscle Tone   Muscle Tone no deficits were identified   Modality Interventions   Planned Modality Interventions Comments prn   General Therapy Interventions   Planned Therapy Interventions balance training;bed mobility training;gait training;ROM;strengthening;transfer training;risk factor education;home program guidelines;progressive activity/exercise   Clinical Impression   Criteria for Skilled Therapeutic Intervention yes, treatment indicated   PT Diagnosis Generalized weakness, Gait impairment, Balance impairment   Influenced by the following impairments Decreased strength, AROM, balance   Functional limitations due to impairments Impaired transfers, bed mobility, ambulation   Clinical Presentation Evolving/Changing   Clinical Presentation Rationale Several episodes of this in the past, History of relatively frequent falls at home, Progressive nature of underlying co-morbidities, well below baseline currently   Clinical Decision Making (Complexity) Moderate complexity   Therapy Frequency` daily   Predicted Duration of Therapy Intervention (days/wks) 1-2 days   Anticipated Discharge Disposition Transitional Care Facility   Risk & Benefits of therapy have been explained Yes   Patient, Family & other staff in agreement with plan of care Yes   Clinical Impression Comments Shaun is a pleasant 89 y/o male admitted to the hospital under Obs status for weakness below baseline with the new inability to walk due to gross bilateral LE weakness. He will benefit from skilled PT services to safely progress activity tolerance and recommend safe discharge disposition as indicated, with ultimate goal of facilitating safe return back home.    [facility] AM-PAC TM \"6 Clicks\"   2016, Trustees of [facility], " "under license to [employer].  All rights reserved.   6 Clicks Short Forms Basic Mobility Inpatient Short Form   [facility] AM-PAC  \"6 Clicks\" V.2 Basic Mobility Inpatient Short Form   1. Turning from your back to your side while in a flat bed without using bedrails? 2 - A Lot   2. Moving from lying on your back to sitting on the side of a flat bed without using bedrails? 2 - A Lot   3. Moving to and from a bed to a chair (including a wheelchair)? 1 - Total   4. Standing up from a chair using your arms (e.g., wheelchair, or bedside chair)? 2 - A Lot   5. To walk in hospital room? 1 - Total   6. Climbing 3-5 steps with a railing? 1 - Total   Basic Mobility Raw Score (Score out of 24.Lower scores equate to lower levels of function) 9   Total Evaluation Time   Total Evaluation Time (Minutes) 35         Yariel Woodward PT, DPT        9/22/2017      1:32 PM  [facility]  [phone][TD1.1]             Revision History        User Key Date/Time User Provider Type Action    > TD1.1 9/22/2017  1:32 PM Yariel Woodward PT Physical Therapist Sign                                                      INTERAGENCY TRANSFER FORM - LAB / IMAGING / EKG / EMG RESULTS   9/21/2017                       [address] SURGICAL: [phone]            Unresulted Labs     None         Lab Results - 3 Days      Glucose by meter [[id number]] (Abnormal)  Resulted: 09/22/17 0831, Result status: Final result    Ordering provider: Peter Vann MD  09/22/17 0826 Resulting lab: POINT OF CARE TEST, GLUCOSE    Specimen Information    Type Source Collected On     09/22/17 0826          Components       Value Reference Range Flag Lab   Glucose 101 70 - 99 mg/dL H 170            Basic metabolic panel [[id number]] (Abnormal)  Resulted: 09/22/17 0603, Result status: Final result    Ordering provider: Peter Vann MD  09/22/17 0001 Resulting lab: [facility]    Specimen " Information    Type Source Collected On   Blood  09/22/17 0525          Components       Value Reference Range Flag Lab   Sodium 141 133 - 144 mmol/L  [facility]   Potassium 3.4 3.4 - 5.3 mmol/L  [facility]   Chloride 106 94 - 109 mmol/L  [facility]   Carbon Dioxide 25 20 - 32 mmol/L  [facility]   Anion Gap 10 3 - 14 mmol/L  [facility]   Glucose 106 70 - 99 mg/dL H [facility]   Urea Nitrogen 42 7 - 30 mg/dL H [facility]   Creatinine 2.45 0.66 - 1.25 mg/dL H [facility]   GFR Estimate 25 >60 mL/min/1.7m2 L [facility]   Comment:  Non  GFR Calc   GFR Estimate If Black 30 >60 mL/min/1.7m2 L [facility]   Comment:  African American GFR Calc   Calcium 8.6 8.5 - 10.1 mg/dL  [facility]            Glucose by meter [[id number]] (Abnormal)  Resulted: 09/22/17 0006, Result status: Final result    Ordering provider: Peter Vann MD  09/21/17 2359 Resulting lab: POINT OF CARE TEST, GLUCOSE    Specimen Information    Type Source Collected On     09/21/17 2359          Components       Value Reference Range Flag Lab   Glucose 115 70 - 99 mg/dL H 170            UA with Microscopic [[id number]] (Abnormal)  Resulted: 09/21/17 2106, Result status: Final result    Ordering provider: Hawk Arteaga MD  09/21/17 1953 Resulting lab: [facility]    Specimen Information    Type Source Collected On   Unspecified Urine Random Urine 09/21/17 2020          Components       Value Reference Range Flag Lab   Color Urine Yellow   [facility]   Appearance Urine Clear   [facility]   Glucose Urine 50 NEG^Negative mg/dL A [facility]   Bilirubin Urine Negative NEG^Negative  [facility]   Ketones Urine Negative NEG^Negative mg/dL  [facility]   Specific [place] Urine 1.014 1.003 - 1.035  [facility]   Blood Urine Negative NEG^Negative  [facility]   pH Urine 5.0 5.0 - 7.0 pH  [facility]   Protein Albumin Urine 100 NEG^Negative mg/dL A [facility]   Urobilinogen mg/dL 0.0 0.0 - 2.0 mg/dL  [facility]   Nitrite Urine Negative NEG^Negative  [facility]   Leukocyte Esterase Urine  Negative NEG^Negative  [facility]   Source Unspecified Urine   [facility]   WBC Urine 1 0 - 2 /HPF  [facility]   RBC Urine 4 0 - 2 /HPF H [facility]   Squamous Epithelial /HPF Urine <1 0 - 1 /HPF  [facility]   Mucous Urine Present NEG^Negative /LPF A [facility]   Hyaline Casts 1 0 - 2 /LPF  [facility]            Troponin I [[id number]]  Resulted: 09/21/17 2030, Result status: Final result    Ordering provider: Hawk Arteaga MD  09/21/17 1953 Resulting lab: [facility]    Specimen Information    Type Source Collected On   Blood  09/21/17 2003          Components       Value Reference Range Flag Lab   Troponin I ES 0.015 0.000 - 0.045 ug/L  [facility]   Comment:         The 99th percentile for upper reference range is 0.045 ug/L.  Troponin values   in the range of 0.045 - 0.120 ug/L may be associated with risks of adverse   clinical events.              Comprehensive metabolic panel [[id number]] (Abnormal)  Resulted: 09/21/17 2029, Result status: Final result    Ordering provider: Hawk Arteaga MD  09/21/17 1953 Resulting lab: [facility]    Specimen Information    Type Source Collected On   Blood  09/21/17 2003          Components       Value Reference Range Flag Lab   Sodium 158 133 - 144 mmol/L H [facility]   Potassium 4.0 3.4 - 5.3 mmol/L  [facility]   Chloride 102 94 - 109 mmol/L  [facility]   Carbon Dioxide 25 20 - 32 mmol/L  [facility]   Anion Gap 31 3 - 14 mmol/L H [facility]   Glucose 155 70 - 99 mg/dL H [facility]   Urea Nitrogen 49 7 - 30 mg/dL H [facility]   Creatinine 2.75 0.66 - 1.25 mg/dL H [facility]   GFR Estimate 22 >60 mL/min/1.7m2 L [facility]   Comment:  Non  GFR Calc   GFR Estimate If Black 26 >60 mL/min/1.7m2 L [facility]   Comment:  African American GFR Calc   Calcium 9.3 8.5 - 10.1 mg/dL  [facility]   Bilirubin Total 0.6 0.2 - 1.3 mg/dL  [facility]   Albumin 3.6 3.4 - 5.0 g/dL  [facility]   Protein Total 7.2 6.8 - 8.8 g/dL  [facility]   Alkaline Phosphatase 93 40 - 150 U/L  [facility]    ALT 18 0 - 70 U/L  [facility]   AST 12 0 - 45 U/L  [facility]            Nt probnp inpatient (BNP) [[id number]] (Abnormal)  Resulted: 09/21/17 2029, Result status: Final result    Ordering provider: Hawk Arteaga MD  09/21/17 1953 Resulting lab: [facility]    Specimen Information    Type Source Collected On   Blood  09/21/17 2003          Components       Value Reference Range Flag Lab   N-Terminal Pro BNP Inpatient 3344 0 - 1800 pg/mL H [facility]   Comment:            Reference range shown and results flagged as abnormal are suggested inpatient   cut points for confirming diagnosis if CHF in an acute setting. Establishing a   baseline value for each individual patient is useful for follow-up. An   inpatient or emergency department NT-proPBNP <300 pg/mL effectively rules out   acute CHF, with 99% negative predictive value.  The outpatient non-acute reference range for ruling out CHF is:   0-125 pg/mL (age 18 to less than 75)   0-450 pg/mL (age 75 yrs and older)              CBC with platelets differential [[id number]] (Abnormal)  Resulted: 09/21/17 2009, Result status: Final result    Ordering provider: Hawk Arteaga MD  09/21/17 1953 Resulting lab: [facility]    Specimen Information    Type Source Collected On   Blood  09/21/17 2003          Components       Value Reference Range Flag Lab   WBC 5.9 4.0 - 11.0 10e9/L  [facility]   RBC Count 3.77 4.4 - 5.9 10e12/L L [facility]   Hemoglobin 11.4 13.3 - 17.7 g/dL L [facility]   Hematocrit 35.4 40.0 - 53.0 % L [facility]   MCV 94 78 - 100 fl  [facility]   MCH 30.2 26.5 - 33.0 pg  [facility]   MCHC 32.2 31.5 - 36.5 g/dL  [facility]   RDW 13.5 10.0 - 15.0 %  [facility]   Platelet Count 132 150 - 450 10e9/L L [facility]   Diff Method Automated Method   [facility]   % Neutrophils 75.9 %  [facility]   % Lymphocytes 9.0 %  [facility]   % Monocytes 12.5 %  [facility]   % Eosinophils 2.4 %  [facility]   % Basophils 0.2 %  [facility]   % Immature Granulocytes 0.0 %   [facility]   Absolute Neutrophil 4.5 1.6 - 8.3 10e9/L  [facility]   Absolute Lymphocytes 0.5 0.8 - 5.3 10e9/L L [facility]   Absolute Monocytes 0.7 0.0 - 1.3 10e9/L  [facility]   Absolute Eosinophils 0.1 0.0 - 0.7 10e9/L  [facility]   Absolute Basophils 0.0 0.0 - 0.2 10e9/L  [facility]   Abs Immature Granulocytes 0.0 0 - 0.4 10e9/L  [facility]            Testing Performed By     Lab - Abbreviation Name Director Address Valid Date Range    22 - [facility] Unknown [address] [place] [address] 05/08/15 1057 - Present    170 - Unknown POINT OF CARE TEST, GLUCOSE Unknown Unknown 10/31/11 1114 - Present               Imaging Results - 3 Days      XR Chest Port 1 View [[id number]]  Resulted: 09/21/17 2132, Result status: Final result    Ordering provider: Hawk Arteaga MD  09/21/17 2058 Resulted by: Eugenie Hernandez MD    Performed: 09/21/17 2106 - 09/21/17 2117 Resulting lab: RADIOLOGY RESULTS    Narrative:       CHEST PORTABLE ONE VIEW  9/21/2017 9:17 PM     COMPARISON: Frontal chest x-ray 8/16/2016.    HISTORY: Weakness, elevated BNP.    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    The cardiac silhouette, pulmonary vasculature, lungs and pleural  spaces are within normal limits.      Impression:       IMPRESSION: Clear lungs.    EUGENIE HERNANDEZ MD      Testing Performed By     Lab - Abbreviation Name Director Address Valid Date Range    104 - Rad [facility] RADIOLOGY RESULTS Unknown Unknown 02/16/05 1553 - Present            Encounter-Level Documents:     There are no encounter-level documents.      Order-Level Documents:     There are no order-level documents.

## 2017-09-21 NOTE — IP AVS SNAPSHOT
` `     [address] SURGICAL: [phone]            Medication Administration Report for Shaun Gotti as of 09/22/17 1353   Legend:    Given Hold Not Given Due Canceled Entry Other Actions    Time Time (Time) Time  Time-Action       Inactive    Active    Linked        Medications 09/16/17 09/17/17 09/18/17 09/19/17 09/20/17 09/21/17 09/22/17    0.45% sodium chloride infusion  Rate: 125 mL/hr Freq: CONTINUOUS Route: IV  Start: 09/22/17 0000          0032 ( )-New Bag       0518 ( )-New Bag       1323 ( )-New Bag           amLODIPine (NORVASC) tablet 10 mg  Dose: 10 mg Freq: DAILY Route: PO  Start: 09/22/17 0900          0842 (10 mg)-Given           aspirin EC EC tablet 81 mg  Dose: 81 mg Freq: DAILY Route: PO  Start: 09/22/17 0900          0842 (81 mg)-Given           clopidogrel (PLAVIX) tablet 75 mg  Dose: 75 mg Freq: DAILY Route: PO  Start: 09/22/17 0900          0842 (75 mg)-Given           finasteride (PROSCAR) tablet 5 mg  Dose: 5 mg Freq: DAILY Route: PO  Start: 09/22/17 0900   Admin Instructions: *Do not handle tablets if you are pregnant*           0842 (5 mg)-Given           glucose 40 % gel 15-30 g  Dose: 15-30 g Freq: EVERY 15 MIN PRN Route: PO  PRN Reason: low blood sugar  Start: 09/21/17 4703   Admin Instructions: Give 15 g for BG 51 to 69 mg/dL IF patient is conscious and able to swallow. Give 30 g for BG less than or equal to 50 mg/dL IF patient is conscious and able to swallow. Do NOT give glucose gel via enteral tube.  IF patient has enteral tube: give apple juice 120 mL (4 oz or 15 g of CHO) via enteral tube for BG 51 to 69 mg/dL.  Give apple juice 240 mL (8 oz or 30 g of CHO) via enteral tube for BG less than or equal to 50 mg/dL.    ~Oral gel is preferable for conscious and able to swallow patient.   ~IF gel unavailable or patient refuses may provide apple juice 120 mL (4 oz or 15 g of CHO). Document juice on I and O flowsheet.              Or  dextrose 50 % injection 25-50  mL  Dose: 25-50 mL Freq: EVERY 15 MIN PRN Route: IV  PRN Reason: low blood sugar  Start: 09/21/17 2343   Admin Instructions: Use if have IV access, BG less than 70 mg/dL and meet dose criteria below:  Dose if conscious and alert (or disorientated) and NPO = 25 mL  Dose if unconscious / not alert = 50 mL  Vesicant.              Or  glucagon injection 1 mg  Dose: 1 mg Freq: EVERY 15 MIN PRN Route: SC  PRN Reason: low blood sugar  PRN Comment: May repeat x 1 only  Start: 09/21/17 2343   Admin Instructions: May give SQ or IM. ONLY use glucagon IF patient has NO IV access AND is UNABLE to swallow AND blood glucose is LESS than or EQUAL to 50 mg/dL.               insulin glargine (LANTUS) injection 20 Units  Dose: 20 Units Freq: EVERY MORNING BEFORE BREAKFAST Route: SC  Start: 09/22/17 0730   Admin Instructions: *Not for IV use, SQ only.  Do not mix with other insulins*           0841 (20 Units)-Given           metoprolol (TOPROL-XL) 24 hr tablet 50 mg  Dose: 50 mg Freq: DAILY Route: PO  Start: 09/22/17 0900          0842 (50 mg)-Given           naloxone (NARCAN) injection 0.1-0.4 mg  Dose: 0.1-0.4 mg Freq: EVERY 2 MIN PRN Route: IV  PRN Reason: opioid reversal  Start: 09/21/17 2343   Admin Instructions: For respiratory rate LESS than or EQUAL to 8.  Partial reversal dose:  0.1 mg titrated q 2 minutes for Analgesia Side Effects Monitoring Sedation Level of 3 (frequently drowsy, arousable, drifts to sleep during conversation).Full reversal dose:  0.4 mg bolus for Analgesia Side Effects Monitoring Sedation Level of 4 (somnolent, minimal or no response to stimulation).               omeprazole (priLOSEC) CR capsule 20 mg  Dose: 20 mg Freq: EVERY MORNING BEFORE BREAKFAST Route: PO  Start: 09/22/17 0730          0842 (20 mg)-Given           ondansetron (ZOFRAN-ODT) ODT tab 4 mg  Dose: 4 mg Freq: EVERY 6 HOURS PRN Route: PO  PRN Reasons: nausea,vomiting  Start: 09/21/17 2343   Admin Instructions: This is Step 1 of nausea and  vomiting management.  If nausea not resolved in 15 minutes, go to Step 2 prochlorperazine (COMPAZINE). Do not push through foil backing. Peel back foil and gently remove. Place on tongue immediately. Administration with liquid unnecessary              Or  ondansetron (ZOFRAN) injection 4 mg  Dose: 4 mg Freq: EVERY 6 HOURS PRN Route: IV  PRN Reasons: nausea,vomiting  Start: 09/21/17 2343   Admin Instructions: This is Step 1 of nausea and vomiting management.  If nausea not resolved in 15 minutes, go to Step 2 prochlorperazine (COMPAZINE).  Irritant.              Completed Medications  Medications 09/16/17 09/17/17 09/18/17 09/19/17 09/20/17 09/21/17 09/22/17         Dose: 500 mL Freq: ONCE Route: IV  Last Dose: Stopped (09/22/17 0031)  Start: 09/21/17 1953   End: 09/22/17 0031 2103 (500 mL)-New Bag [C]        0031-Stopped             Dose: 500 mL Freq: ONCE Route: IV  Last Dose: Stopped (09/22/17 0150)  Start: 09/22/17 0100   End: 09/22/17 0150          0050 (500 mL)-New Bag       0150-Stopped          Discontinued Medications  Medications 09/16/17 09/17/17 09/18/17 09/19/17 09/20/17 09/21/17 09/22/17         Rate: 125 mL/hr Freq: CONTINUOUS Route: IV  Start: 09/21/17 1954   End: 09/21/17 2355   Admin Instructions: Administer after the bolus.                 2355-Med Discontinued          Rate: 125 mL/hr Freq: CONTINUOUS Route: IV  Start: 09/21/17 2345   End: 09/21/17 2355                2355-Med Discontinued          Freq: ONCE Route: IV  Start: 09/22/17 0030   End: 09/22/17 0046                 0046-Med Discontinued

## 2017-09-21 NOTE — IP AVS SNAPSHOT
"` `           [address] MEDICAL SURGICAL: [phone]                                              INTERAGENCY TRANSFER FORM - NURSING   2017                    Hospital Admission Date: 2017  JANELLEKADEN DENISE   : 1925  Sex: Male        Attending Provider: Kurt Carrillo MD     Allergies:  Fluoxetine, Lisinopril    Infection:  None   Service:  HOSPITALIST    Ht:  1.803 m (5' 11\")   Wt:  70.7 kg (155 lb 12.8 oz)   Admission Wt:  77.1 kg (170 lb)    BMI:  21.73 kg/m 2   BSA:  1.88 m 2            Patient PCP Information     Provider PCP Type    Sony Berkowitz MD General      Current Code Status     Date Active Code Status Order ID Comments User Context       Prior      Code Status History     Date Active Date Inactive Code Status Order ID Comments User Context    2017  1:37 PM  DNR [id number]  Kurt Carrillo MD Outpatient    2017 11:43 PM 2017  1:37 PM DNR [id number]  Peter Vann MD Inpatient    2016 10:21 AM 2017 11:43 PM DNR [id number]  Kurt Carrillo MD Outpatient    2016  1:08 AM 2016 10:21 AM DNR [id number]  Cash Mcnamara MD Inpatient    3/29/2013 10:28 AM 2016  1:08 AM DNR [id number]  Kurt Carrillo MD Outpatient    3/25/2013  4:11 PM 3/29/2013 10:28 AM DNR [id number]  Joan Hill PA-C Inpatient    3/25/2013  3:28 PM 3/25/2013  4:11 PM Full Code [id number]  Favian Beal RN Inpatient      Advance Directives        Does patient have a scanned Advance Directive/ACP document in EPIC?           Yes        Hospital Problems as of 2017              Priority Class Noted POA    Diabetes mellitus, type 2 (H) Medium  2003 Yes    Essential hypertension, benign Low  2006 Yes    CKD (chronic kidney disease) stage 4, 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) Medium  3/25/2013 Yes    Thrombocytopenia (H) Low  3/25/2013 Yes    Cardiac pacemaker in situ Medium  2015 Yes    Weakness Medium  2016 Yes    Fall Medium  2017 Yes    " Acute kidney injury (H) Medium  9/21/2017 Yes    Hypernatremia Medium  9/21/2017 Yes    Coronary artery disease involving native coronary artery - angio in 2006 with multivessel disease managed medically Medium  9/21/2017 Yes    * (Principal)Hypovolemia Medium  9/22/2017 Yes      Non-Hospital Problems as of 9/22/2017              Priority Class Noted    Coronary atherosclerosis Low  7/7/2006    CARDIOVASCULAR SCREENING; LDL GOAL LESS THAN 100 Medium  10/31/2010    Advanced directives, counseling/discussion Low  2/2/2012    Acute viral bronchitis High  3/25/2013    Pneumonia High  3/25/2013    Cough High  3/25/2013    Acute respiratory failure with hypoxia (H) High  3/25/2013    Bronchitis Medium  3/25/2013    Diabetes type 2, uncontrolled (H) Medium  3/25/2013    Sepsis (H) High  3/27/2013    Elevated lactic acid level Medium  8/17/2016    CKD (chronic kidney disease) stage 3, GFR 30-59 ml/min Medium  8/17/2016      Immunizations     Name Date      Influenza (H1N1) 01/23/10     Influenza (IIV3) 09/28/10     Influenza (IIV3) 09/23/09     Influenza (IIV3) 10/22/04     Influenza (IIV3) 10/09/03     Influenza (IIV3) 11/11/02     Influenza (IIV3) 10/19/01     Influenza (IIV3) 11/06/00     Influenza (IIV3) 10/22/99     Influenza (IIV3) 10/08/98     Influenza (IIV3) 10/21/97     Influenza (IIV3) 10/25/96     Influenza (IIV3) 10/20/95     Influenza (IIV3) 10/20/94     Influenza (IIV3) 10/14/93     Influenza (IIV3) 11/04/92     Pneumococcal 23 valent 11/11/02     Pneumococcal 23 valent 09/16/97     TD (ADULT, 7+) 05/02/06     TD (ADULT, 7+) 09/16/97          END      ASSESSMENT     Discharge Profile Flowsheet     EXPECTED DISCHARGE     FUNCTIONAL LEVEL CURRENT      Expected Discharge Date  09/22/17 09/22/17 1053   Change in Functional Status Since Onset of Current Illness/Injury  no 09/22/17 0001    DISCHARGE NEEDS ASSESSMENT     COMMUNICATION ASSESSMENT      Readmission Within The Last 30 Days  no previous admission in last  "30 days 09/22/17 1053   Patient's communication style  spoken language (English or Bilingual) 09/21/17 1932    Outpatient/Agency/Support Group Needs  skilled nursing facility 09/22/17 1053   SKIN      Anticipated Changes Related to Illness  inability to care for self 09/22/17 1053   Inspection of bony prominences  Full 09/22/17 0950    Equipment Currently Used at Home  grab bar;glucometer;wheelchair, power;shower chair;walker, rolling 09/22/17 0940   Skin WDL  ex 09/22/17 0008    Discharge Facility/Level Of Care Needs  rehabilitation facility 09/22/17 1053   Skin Integrity  blister(s) 09/22/17 0950    Transportation Available  car;family or friend will provide 09/22/17 1053   SAFETY      Current Discharge Risk  lives alone 09/22/17 1054   Safety WDL  WDL 09/22/17 0950    Equipment Used at Home  -- (has shower chair and front wheeled walker) 03/26/13 1040                      Assessment WDL (Within Defined Limits) Definitions           Safety WDL     Effective: 09/28/15    Row Information: <b>WDL Definition:</b> Bed in low position, wheels locked; call light in reach; upper side rails up x 2; ID band on<br> <font color=\"gray\"><i>Item=AS safety wdl>>List=AS safety wdl>>Version=F14</i></font>      Skin WDL     Effective: 09/28/15    Row Information: <b>WDL Definition:</b> Warm; dry; intact; elastic; without discoloration; pressure points without redness<br> <font color=\"gray\"><i>Item=AS skin wdl>>List=AS skin wdl>>Version=F14</i></font>      Vitals     Vital Signs Flowsheet     VITAL SIGNS     HEIGHT AND WEIGHT      Temp  97  F (36.1  C) 09/22/17 0846   Height  1.803 m (5' 11\") 09/21/17 2352    Temp src  Oral 09/22/17 0507   Height Method  Stated 09/21/17 2352    Resp  16 09/22/17 0846   Height Method  Stated 09/21/17 1937    Pulse  71 09/22/17 0507   Weight  70.7 kg (155 lb 12.8 oz) 09/21/17 2352    Heart Rate  70 09/22/17 0851   Weight Method  Bed scale 09/21/17 2352    Pulse/Heart Rate Source  Monitor 09/22/17 0841  "  Bed Scale  Other, please comment;Pillow (add comment for number);Fitted sheet;Cover/flat sheet (add comment for number);[place] (add comment for number);Draw sheet/ pad (add comment for number) (1 pillow,  1) 09/21/17 2352    BP  177/72 09/22/17 0846   BSA (Calculated - sq m)  1.88 09/21/17 2352    OXYGEN THERAPY     BMI (Calculated)  21.78 09/21/17 2352    SpO2  97 % 09/22/17 0846   EKG MONITORING      O2 Device  None (Room air) 09/22/17 0846   Cardiac Regularity  Irregular 09/21/17 2203    PAIN/COMFORT     Cardiac Rhythm  -- (first degree AV block) 09/21/17 2203    Patient Currently in Pain  denies 09/22/17 0846   DAILY CARE      0-10 Pain Scale  4 09/21/17 1937   Activity Management  activity encouraged 09/22/17 0950            Patient Lines/Drains/Airways Status    Active LINES/DRAINS/AIRWAYS     Name: Placement date: Placement time: Site: Days: Last dressing change:    Peripheral IV 09/21/17 Right Upper forearm 09/21/17 1957   Upper forearm   less than 1             Patient Lines/Drains/Airways Status    Active PICC/CVC     None            Intake/Output Detail Report     Date Intake     Output Net    Shift P.O. I.V. IV Piggyback Total Urine Total       Noc 09/20/17 2300 - 09/21/17 0659 -- -- -- -- -- -- 0    Day 09/21/17 0700 - 09/21/17 1459 -- -- -- -- -- -- 0    [place] 09/21/17 1500 - 09/21/17 2259 -- -- -- -- -- -- 0    Noc 09/21/17 2300 - 09/22/17 0659 --      Day 09/22/17 0700 - 09/22/17 1459 360 -- -- 360 550 550 -190      Case Management/Discharge Planning     Case Management/Discharge Planning Flowsheet     REFERRAL INFORMATION     COPING/STRESS      Admission Type  observation 09/22/17 1053   Major Change/Loss/Stressor  medical condition/diagnosis 08/17/16 0024    Arrived From  home healthcare service 09/22/17 1053   EXPECTED DISCHARGE      Referral Source  physician 09/22/17 1053   Expected Discharge Date  09/22/17 09/22/17 1053    Reason For Consult  discharge  planning;facility placement 09/22/17 1053   DISCHARGE PLANNING      CTS Assigned to Case  Maria Alejandra 09/22/17 1053   Readmission Within The Last 30 Days  no previous admission in last 30 days 09/22/17 1053    Primary Care Clinic Name   [facility] 09/22/17 1053   Anticipated Changes Related to Illness  inability to care for self 09/22/17 1053    Primary Care MD Name  Brent 09/22/17 1053   Transportation Available  car;family or friend will provide 09/22/17 1053    LIVING ENVIRONMENT     Current Discharge Risk  lives alone 09/22/17 1054    Lives With  alone 09/22/17 1053   Discharge Facility/Level Of Care Needs  rehabilitation facility 09/22/17 1053    Living Arrangements  apartment 09/22/17 1053   Skilled Nursing Facility  [facility] 03/29/13 1055    Provides Primary Care For  no one, unable/limited ability to care for self 09/22/17 1053   Outpatient/Agency/Support Group Needs  skilled nursing facility 09/22/17 1053    Primary Care Provided By  child(joseph) (specify) (Son Panchito) 09/22/17 1053   Equipment Used at Home  -- (has shower chair and front wheeled walker) 03/26/13 1040    Quality Of Family Relationships  supportive;involved 09/22/17 1053   Discharge Plan Concerns  no concerns 09/22/17 0006    Able to Return to Prior Living Arrangements  other (see comments) (Therapy recommends TCU) 09/22/17 1053   FINAL RESOURCES      HOME SAFETY     Equipment Currently Used at Home  grab bar;glucometer;wheelchair, power;shower chair;walker, rolling 09/22/17 0940    Patient Feels Safe Living in Home?  yes 09/22/17 1053   ABUSE RISK SCREEN      ASSESSMENT OF FAMILY/SOCIAL SUPPORT     QUESTION TO PATIENT:  Has a member of your family or a partner(now or in the past) intimidated, hurt, manipulated, or controlled you in any way?  no 09/22/17 0002    Marital Status   09/22/17 1053   QUESTION TO PATIENT: Do you feel safe going back to the place where you are living?  yes 09/22/17 0002    Who is your support system?   Children 09/22/17 1053   OBSERVATION: Is there reason to believe there has been maltreatment of a vulnerable adult (ie. Physical/Sexual/Emotional abuse, self neglect, lack of adequate food, shelter, medical care, or financial exploitation)?  no 09/22/17 0002    Description of Support System  Supportive;Involved 09/22/17 1053   HOMICIDE RISK      Support Assessment  Adequate family and caregiver support 09/22/17 1053   Feels Like Hurting Others  no 09/21/17 1940    Quality of Family Relationships  supportive;involved 09/22/17 1053

## 2017-09-21 NOTE — LETTER
"Transition Communication Hand-off for Care Transitions to Next Level of Care Provider    Name: Shaun Gotti  MRN #: [id number]  Primary Care Provider: Sony Berkowitz  Primary Care MD Name: Brent  Primary Clinic: [facility] [address] [place] [address]  Primary Care Clinic Name: [facility]  Reason for Hospitalization:  Dehydration [E86.0]  Weakness [R53.1]  Congestive heart failure, unspecified congestive heart failure chronicity, unspecified congestive heart failure type (H) [I50.9]  Admit Date/Time: 9/21/2017  7:29 PM  Discharge Date: ***  Payor Source: Payor: BCBS / Plan: BCBS PLATINUM BLUE / Product Type: PPO /     Readmission Assessment Measure (DAPHNE) Risk Score/category: ***    Plan of Care Goals/Milestone Events:   Patient Concerns: ***   Patient Goals:   Short-term ***   Long-term ***   Medical Goals   Short-term ***   Long-term ***         Reason for Communication Hand-off Referral: {CCREASONCTNHANDOFFREFERRALCTS:98955}    Discharge Plan:       Concern for non-adherence with plan of care:   Y/N ***  Discharge Needs Assessment:  Needs       Most Recent Value    Anticipated Changes Related to Illness inability to care for self    Equipment Currently Used at Home grab bar, glucometer, wheelchair, power, shower chair, walker, rolling    Transportation Available car, family or friend will provide    Current Discharge Risk lives alone          Already enrolled in Tele-monitoring program and name of program:  ***  Follow-up specialty is recommended: {YES / NO:66973::\"Yes\"}    Follow-up plan:  No future appointments.    Any outstanding tests or procedures:        Referrals     Future Labs/Procedures    Physical Therapy Adult Consult     Comments:    Evaluate and treat as clinically indicated.    Reason:  Weakness, unsteady gait            Key Recommendations:      Maria Alejandra Leong    AVS/Discharge Summary is the source of truth; this is a helpful guide for improved communication of patient " story

## 2017-09-21 NOTE — IP AVS SNAPSHOT
` ` Patient Information     Patient Name Sex     Shaun Gotti ([id number]) Male 1925       Room Bed    267 267-01      Patient Demographics     Address Phone    [address]   [facility] [address] [phone] (Home)  NONE (Work)  NONE (Mobile)      Patient Ethnicity & Race     Ethnic Group Patient Race    American White      Emergency Contact(s)     Name Relation Home Work Mobile    Panchito Gotti Son [phone]    Qian Gotti Daughter [phone] none [phone]    Vika Gotti Daughter [phone] none [phone]      Documents on File        Status Date Received Description       Documents for the Patient    Privacy Notice - [place] Received 09     Insurance Card Received 06 MEDICARE    Face Sheet Received () 09     Insurance Card Received () 06     Insurance Card Received () 08     Privacy Notice - [place] Received 08     External Medication Information Consent Accepted () 09     External Medication Information Consent Accepted () 09/13/10     Face Sheet Received () 09/13/10     HIM ADILIA Authorization  12/07/10 MEDICARE PART A - 10/14/10    Patient ID Received 14 [facility] [facility]    Consent for Services - Hospital/Clinic Received () 12/16/10     Insurance Card Received () 11     Consent for Services - Hospital/Clinic Received () 11     Other Received 11     Insurance Card Received () 11     External Medication Information Consent Accepted () 10/21/11     Advance Directives and Living Will Not Received  [facility] Care Directive-02    Insurance Card Received () 10/25/12     Consent for Services - Hospital/Clinic Received () 10/25/12     External Medication Information Consent Accepted 12     Consent for EHR Access  13 Copied from existing Consent for services - C/HOD collected on 10/25/2012     [facility] Specified Other       Advance Directives and Living Will Not Received  VALIDATION OF ADVANCE DIRECTIVE 02    Consent for Services - Geriatrics Received 04/10/13     Insurance Card Received 14 MEDICA    Consent for Services - Hospital/Clinic Received () 14     Physical Therapy Certification Received 14     Physical Therapy Re-Certification Received 14 to 2014    Consent for Services - Hospital/Clinic Received () 05/06/15     Business/Insurance/Care Coordination/Health Form - Patient  06/10/15 ADULT REHABILITATION ATTENDANCE POLICY    Insurance Card Received 08/26/15 [employer]    Patient ID Received 08/26/15     Consent for Services/Privacy Notice - Hospital/Clinic Received () 16     HIM ADILIA Authorization  17 [facility]    Insurance Card Received 17     Insurance Card Received 17 consent    Consent for Services/Privacy Notice - Hospital/Clinic Received 17 see above scanned as ins    Consent for Services/Privacy Notice - Hospital/Clinic Received 17        Documents for the Encounter    CMS IM for Patient Signature       Observation Notice Received 17     ECG   ECG Report      Admission Information     Attending Provider Admitting Provider Admission Type Admission Date/Time    Kurt Carrillo MD [facility], Peter Martin MD Emergency 17    Discharge Date Hospital Service Auth/Cert Status Service Area     Hospitalist [facility] SERVICES    Unit Room/Bed Admission Status       PH 2A MEDICAL SURGICAL 267/267-01 Admission (Confirmed)       Admission     Complaint    Weakness      Hospital Account     Name Acct ID Class Status Primary Coverage    Shaun Gotti [id number] Observation Open MEDICARE - MEDICARE FOR HB SUPPLEMENT            Guarantor Account (for Hospital Account #[id number])     Name Relation to Pt Service Area Active? Acct Type    Shaun Gotti  FCS Yes  Personal/Family    Address Phone          [address] [place] [address] [phone](H)              Coverage Information (for Hospital Account #[id number])     1. MEDICARE/MEDICARE FOR HB SUPPLEMENT     F/O Payor/Plan Precert #    MEDICARE/MEDICARE FOR HB SUPPLEMENT     Subscriber Subscriber #    Shaun Gotti [id number]    Address Phone    [employer]  [address]  [place] [address] [phone]          2. BCBS/BCBS PLATINUM BLUE     F/O Payor/Plan Precert #    BCBS/BCBS PLATINUM BLUE     Subscriber Subscriber #    Shaun Gotti [id number]    Address Phone    [address] [address]  [place], [place] [id number] [phone]

## 2017-09-21 NOTE — IP AVS SNAPSHOT
"          [address] MEDICAL SURGICAL: [phone]                                              INTERAGENCY TRANSFER FORM - PHYSICIAN ORDERS   2017                    Hospital Admission Date: 2017  JANELLEKADEN DENISE   : 1925  Sex: Male        Attending Provider: Kurt Carrillo MD     Allergies:  Fluoxetine, Lisinopril    Infection:  None   Service:  HOSPITALIST    Ht:  1.803 m (5' 11\")   Wt:  70.7 kg (155 lb 12.8 oz)   Admission Wt:  77.1 kg (170 lb)    BMI:  21.73 kg/m 2   BSA:  1.88 m 2            Patient PCP Information     Provider PCP Type    Sony Berkowitz MD General      ED Clinical Impression     Diagnosis Description Comment Added By Time Added    Weakness [R53.1] Weakness [R53.1]  Hawk Arteaga MD 2017 10:06 PM    Dehydration [E86.0] Dehydration [E86.0]  Hawk Arteaga MD 2017 10:06 PM    Congestive heart failure, unspecified congestive heart failure chronicity, unspecified congestive heart failure type (H) [I50.9] Congestive heart failure, unspecified congestive heart failure chronicity, unspecified congestive heart failure type (H) [I50.9]  Hawk Arteaga MD 2017 10:06 PM      Hospital Problems as of 2017              Priority Class Noted POA    Diabetes mellitus, type 2 (H) Medium  2003 Yes    Essential hypertension, benign Low  2006 Yes    CKD (chronic kidney disease) stage 4, 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) Medium  3/25/2013 Yes    Thrombocytopenia (H) Low  3/25/2013 Yes    Cardiac pacemaker in situ Medium  2015 Yes    Weakness Medium  2016 Yes    Fall Medium  2017 Yes    Acute kidney injury (H) Medium  2017 Yes    Hypernatremia Medium  2017 Yes    Coronary artery disease involving native coronary artery - angio in  with multivessel disease managed medically Medium  2017 Yes    * (Principal)Hypovolemia Medium  2017 Yes      Non-Hospital Problems as of 2017              Priority " Class Noted    Coronary atherosclerosis Low  7/7/2006    CARDIOVASCULAR SCREENING; LDL GOAL LESS THAN 100 Medium  10/31/2010    Advanced directives, counseling/discussion Low  2/2/2012    Acute viral bronchitis High  3/25/2013    Pneumonia High  3/25/2013    Cough High  3/25/2013    Acute respiratory failure with hypoxia (H) High  3/25/2013    Bronchitis Medium  3/25/2013    Diabetes type 2, uncontrolled (H) Medium  3/25/2013    Sepsis (H) High  3/27/2013    Elevated lactic acid level Medium  8/17/2016    CKD (chronic kidney disease) stage 3, GFR 30-59 ml/min Medium  8/17/2016      Code Status History     Date Active Date Inactive Code Status Order ID Comments User Context    9/22/2017  1:37 PM  DNR [id number]  Kurt Carrillo MD Outpatient    9/21/2017 11:43 PM 9/22/2017  1:37 PM DNR [id number]  Peter Vann MD Inpatient    8/17/2016 10:21 AM 9/21/2017 11:43 PM DNR [id number]  Kurt Carrillo MD Outpatient    8/17/2016  1:08 AM 8/17/2016 10:21 AM DNR [id number]  Cash Mcnamara MD Inpatient    3/29/2013 10:28 AM 8/17/2016  1:08 AM DNR [id number]  Kurt Carrillo MD Outpatient    3/25/2013  4:11 PM 3/29/2013 10:28 AM DNR [id number]  Joan Hill PA-C Inpatient    3/25/2013  3:28 PM 3/25/2013  4:11 PM Full Code [id number]  Favian Beal RN Inpatient         Medication Review      CONTINUE these medications which have NOT CHANGED        Dose / Directions Comments    acetaminophen 500 MG tablet   Commonly known as:  TYLENOL        1 TABLET THREE TIMES A DAY. DO NOT EXCEED 4000MG OF ACETAMINOPHEN IN A 24 HOUR PERIOD.   Refills:  0        amLODIPine 10 MG tablet   Commonly known as:  NORVASC        1 TABLET DAILY   Refills:  0        aspirin 81 MG tablet        1 TABLET DAILY   Refills:  0        ATORVASTATIN CALCIUM PO        Dose:  20 mg   Take 20 mg by mouth daily   Refills:  0        clopidogrel 75 MG tablet   Commonly known as:  PLAVIX        1 TABLET DAILY   Refills:  0        finasteride 5 MG  tablet   Commonly known as:  PROSCAR        1 TABLET DAILY   Quantity:  30   Refills:  0        hypromellose 0.3 % Soln ophthalmic solution   Commonly known as:  GENTEAL        Dose:  1 drop   1 drop 4 times daily   Refills:  0        LANTUS VIAL 100 UNIT/ML injection   Generic drug:  insulin glargine        Dose:  20 Units   Inject 20 Units Subcutaneous every morning   Refills:  0        METOPROLOL SUCCINATE PO        Dose:  50 mg   Take 50 mg by mouth daily.   Refills:  0        nitroGLYcerin 0.4 MG sublingual tablet   Commonly known as:  NITROSTAT        1 TAB EVERY 5 MIN AS NEEDED FOR CHEST PAIN, UP TO 3 PER EPISODE   Refills:  0        omeprazole 20 MG CR capsule   Commonly known as:  priLOSEC        1 CAPSULE DAILY   Refills:  0        * order for DME   Used for:  Low back pain, Fall, initial encounter        Equipment being ordered: 4 wheel walker with brakes and seat   Quantity:  1 Device   Refills:  0        * order for DME   Used for:  Sepsis(995.91), Falls frequently        Equipment being ordered: walker   Quantity:  1 Device   Refills:  0        * order for DME   Used for:  Sepsis(995.91), Falls frequently        Equipment being ordered: transfer bridge   Quantity:  1 Device   Refills:  0        PRESERVISION AREDS 2 PO        Dose:  2 tablet   Take 2 tablets by mouth daily One in AM; one in PM   Refills:  0        RENAL MULTIVITAMIN FORMULA PO        Dose:  1 capsule   Take 1 capsule by mouth every morning   Refills:  0        sertraline 25 MG tablet   Commonly known as:  ZOLOFT        Dose:  25 mg   Take 1 tablet (25 mg) by mouth daily   Quantity:  30 tablet   Refills:  0        TAMSULOSIN HCL CAPS 0.4 MG OR        1 CAPSULE DAILY   Refills:  0        VITAMIN D (CHOLECALCIFEROL) PO        Dose:  1000 Units   Take 1,000 Units by mouth 2 times daily. Take 2 tablets twice daily for 30 days, then take 1 tablet twice daily.   Refills:  0        * Notice:  This list has 3 medication(s) that are the same as  other medications prescribed for you. Read the directions carefully, and ask your doctor or other care provider to review them with you.            Summary of Visit     Reason for your hospital stay       Hospitalized with weakness due to dehydration (with high sodium level) from not drinking enough fluids and improved             After Care     Activity - Up with assistive device           Advance Diet as Tolerated       Follow this diet upon discharge: Orders Placed This Encounter      Moderate Consistent CHO Diet       General info for SNF       Length of Stay Estimate: Short Term Care: Estimated # of Days <30  Condition at Discharge: Stable  Level of care:skilled   Rehabilitation Potential: Fair  Admission H&P remains valid and up-to-date: Yes  Recent Chemotherapy: N/A  Use Nursing Home Standing Orders: Yes       Glucose monitor nursing POCT       Once daily at varying times before meals and at bedtime             Referrals     Physical Therapy Adult Consult       Evaluate and treat as clinically indicated.    Reason:  Weakness, unsteady gait             Follow-Up Appointment Instructions     Future Labs/Procedures    Follow Up and recommended labs and tests     Comments:    Follow up with Nursing home provider.  The following labs/tests are recommended: basic metabolic panel within 1 week.      Follow-Up Appointment Instructions     Follow Up and recommended labs and tests       Follow up with Nursing home provider.  The following labs/tests are recommended: basic metabolic panel within 1 week.             Statement of Approval     Ordered          09/22/17 1337  I have reviewed and agree with all the recommendations and orders detailed in this document.  EFFECTIVE NOW     Approved and electronically signed by:  Kurt Carrillo MD

## 2017-09-21 NOTE — IP AVS SNAPSHOT
` `           [address] SURGICAL: [phone]                 INTERAGENCY TRANSFER FORM - NOTES (H&P, Discharge Summary, Consults, Procedures, Therapies)   2017                    Hospital Admission Date: 2017  JANELLE DENISE   : 1925  Sex: Male        Patient PCP Information     Provider PCP Type    Sony Berkowitz MD General         History & Physicals      H&P by Peter Vann MD at 2017 10:36 PM     Author:  Peter Vann MD Service:  Hospitalist Author Type:  Physician    Filed:  2017 10:46 PM Date of Service:  2017 10:36 PM Creation Time:  2017 10:36 PM    Status:  Signed :  Peter Vann MD (Physician)         [facility]    History and Physical  Hospitalist       Date of Admission:  2017  Date of Service (when I saw the patient):[DF1.1] 17    Assessment & Plan[DF1.2]       Active Problems:    Weakness    Assessment: likely secondary to volume depletion. He admittedly does not drink much water and apparently was just recently taken off of a diuretic.  He has an associated MARQUIS with hypernatremia and appears very dry on exam.  He had a similar episode one year ago and responded dramatically to fluids overnight.  He has not had any CP, SOB, focal neurologic symptoms to suggest cardiac or neurologic etiologies.  He has only mild anemia that is chronic and stable.      Plan: register to observation, IV fluids, PT evaluation tomorrow morning, follow intake and output closely and recheck basic profile tomorrow morning      Fall    Assessment: due to his weakness. 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)    Assessment:[DF1.1]  Creatinine higher than normal related to MARQUIS[DF1.3]    Plan:[DF1.1] see below[DF1.3]      Acute kidney injury (H)    Assessment:[DF1.1] likely secondary to volume depetion[DF1.3]    Plan:[DF1.1] IV fluids, follow intake and output and recheck basic profile tomorrow morning[DF1.3]      Hypernatremia    Assessment:[DF1.1] appears dehydrated[DF1.3]    Plan:[DF1.1] 1/2 NS and recheck tomorrow morning[DF1.3]      Coronary artery disease involving native coronary artery - angio in 2006 with multivessel disease managed medically    Assessment:[DF1.1] no CP or SOB with normal troponin and EKG without acute ischemic changes[DF1.3]    Plan:[DF1.1] no acute intervention[DF1.3]      Essential hypertension, benign    Assessment:[DF1.1] chronic[DF1.3]    Plan:[DF1.1] continue home medications[DF1.3]      Thrombocytopenia (H)    Assessment:[DF1.1] chronic and appears stable[DF1.3]    Plan:[DF1.1] no need to follow[DF1.3]      Cardiac pacemaker in situ    Assessment:[DF1.1] noted past history[DF1.3]    Plan:[DF1.1] no acute intervention[DF1.3]    DVT Prophylaxis:[DF1.1] anticipate less than 24 hour stay[DF1.3]  Code Status:[DF1.1] DNR[DF1.3]    Disposition: Expected discharge in[DF1.1] 1[DF1.3] days once[DF1.1] PT eval completed and plan in place for safe disposition and creatinine and sodium improving[DF1.3].[DF1.1]    Peter Vann MD    Primary Care Physician   Sony Berkowitz    Chief Complaint[DF1.2]   Weak    History is obtained from the patient[DF1.3]    History of Present Illness[DF1.2]   Shaun Gotti is a 89 year old male who presents with[DF1.1] weakness. He started to feel somewhat weak yesterday.  Today however his legs got so weak he fell to the ground. He thinks he might have caught the edge of a counter top on the way down but did not sustain any injuries.  He did not lose consciousness.  He tried getting up but his legs were too weak so he was brought to the ED. He did not have any focal weakness, changes  in speech, CP or SOB.  He admits he doesn't drink much water.  He denies excessive urination and denies diarrhea or vomiting.  He was recently taken off of a diuretic but details as to why are not known.[DF1.3]     Past Medical History[DF1.2]    I have reviewed this patient's medical history and updated it with pertinent information if needed.[DF1.1]   Past Medical History:   Diagnosis Date     Bronchitis, not specified as acute or chronic 09/26/10    Admitted. Lower respiratory infection, severe weakness.     Chronic kidney disease (CKD)      Coronary atherosclerosis of native coronary artery 2010    Admitted. Syncopal spell and substernal chest pain.     Coronary atherosclerosis of unspecified type of vessel, native or graft      Hernia of unspecified site of abdominal cavity without mention of obstruction or gangrene     left inguinal hernia     Other and unspecified hyperlipidemia      Prostatitis, unspecified     Chronic prostatitis     Type II or unspecified type diabetes mellitus without mention of complication, not stated as uncontrolled      Unspecified essential hypertension        Past Surgical History[DF1.2]   I have reviewed this patient's surgical history and updated it with pertinent information if needed.[DF1.1]  Past Surgical History:   Procedure Laterality Date     CARDIAC CATHERIZATION  10/23/2006    [facility]     HC REMOVAL GALLBLADDER      Cholecystectomy     HC S&I FLUORO PACEMAKER       HERNIA REPAIR         Prior to Admission Medications   Prior to Admission Medications   Prescriptions Last Dose Informant Patient Reported? Taking?   AMLODIPINE BESYLATE 10 MG OR TABS 2017 at 1000  Yes Yes   Si TABLET DAILY   ASPIRIN 81 MG OR TABS 2017 at 0800  Yes Yes   Si TABLET DAILY   ATORVASTATIN CALCIUM PO 2017 at 0800  Yes Yes   Sig: Take 20 mg by mouth daily   B Complex-C-Folic Acid (RENAL MULTIVITAMIN FORMULA PO) 2017 at 0800  Yes Yes   Sig: Take 1 capsule by  mouth every morning   CLOPIDOGREL BISULFATE 75 MG OR TABS 2017 at 0800  No Yes   Si TABLET DAILY   FINASTERIDE 5 MG OR TABS 2017 at 0800  No Yes   Si TABLET DAILY   METOPROLOL SUCCINATE PO 2017 at 0800  Yes Yes   Sig: Take 50 mg by mouth daily.   Multiple Vitamins-Minerals (PRESERVISION AREDS 2 PO) 2017 at 0800  Yes Yes   Sig: Take 2 tablets by mouth daily One in AM; one in PM   NITROGLYCERIN 0.4 MG SL SUBL Unknown at Unknown time  Yes No   Si TAB EVERY 5 MIN AS NEEDED FOR CHEST PAIN, UP TO 3 PER EPISODE   OMEPRAZOLE 20 MG OR CPDR 2017 at 0800  Yes Yes   Si CAPSULE DAILY   ORDER FOR DME 2017 at Unknown time  No Yes   Sig: Equipment being ordered: 4 wheel walker with brakes and seat   ORDER FOR DME 2017 at Unknown time  No Yes   Sig: Equipment being ordered: walker   ORDER FOR DME 2017 at Unknown time  No Yes   Sig: Equipment being ordered: transfer bridge   TAMSULOSIN HCL CAPS 0.4 MG OR 2017 at 0800  Yes Yes   Si CAPSULE DAILY   VITAMIN D, CHOLECALCIFEROL, PO 2017 at 0800  Yes Yes   Sig: Take 1,000 Units by mouth 2 times daily. Take 2 tablets twice daily for 30 days, then take 1 tablet twice daily.   acetaminophen (TYLENOL) 500 MG tablet 2017 at 1200  Yes Yes   Si TABLET THREE TIMES A DAY. DO NOT EXCEED 4000MG OF ACETAMINOPHEN IN A 24 HOUR PERIOD.   hypromellose (GENTEAL) 0.3 % SOLN 2017 at 1000  Yes Yes   Si drop 4 times daily   insulin glargine (LANTUS VIAL) 100 UNIT/ML injection 2017 at 1000  Yes Yes   Sig: Inject 20 Units Subcutaneous every morning    sertraline (ZOLOFT) 25 MG tablet 2017 at 0800  Yes Yes   Sig: Take 1 tablet (25 mg) by mouth daily      Facility-Administered Medications: None     Allergies   Allergies   Allergen Reactions     Fluoxetine      Lisinopril        Social History[DF1.2]   I have reviewed this patient's social history and updated it with pertinent information if needed. Shaun ARROYO  Shen[DF1.1]  reports that he has never smoked. He has never used smokeless tobacco. He reports that he does not drink alcohol or use illicit drugs.    Family History[DF1.2]   I have reviewed this patient's family history and updated it with pertinent information if needed.[DF1.1]   Family History   Problem Relation Age of Onset     CANCER Father      prostate cancer       Review of Systems[DF1.2]   The 10 point Review of Systems is negative other than noted in the HPI or here.[DF1.3]     Physical Exam   Temp: 99.3  F (37.4  C) Temp src: Oral BP: (!) 173/91   Heart Rate: 81   SpO2: 98 % O2 Device: None (Room air)[DF1.2]    Vital Signs with Ranges[DF1.1]  Temp:  [99.3  F (37.4  C)] 99.3  F (37.4  C)  Heart Rate:  [81] 81  BP: (173)/(91) 173/91  SpO2:  [98 %] 98 %  170 lbs 0 oz[DF1.2]    Constitutional:   awake, alert, cooperative, no apparent distress, and appears stated age     Eyes:   Lids and lashes normal, pupils equal, round and reactive to light, extra ocular muscles intact, sclera clear, conjunctiva normal     ENT:   Normocephalic, without obvious abnormality, atraumatic, sinuses nontender on palpation, external ears without lesions, oral pharynx with very dry mucous membranes, tonsils without erythema or exudates, gums normal and good dentition.     Neck:   Supple, symmetrical, trachea midline, no adenopathy, thyroid symmetric, not enlarged and no tenderness, skin normal     Hematologic / Lymphatic:   no cervical lymphadenopathy and no supraclavicular lymphadenopathy     Back:   Symmetric, no curvature, spinous processes are non-tender on palpation, paraspinous muscles are non-tender on palpation, no costal vertebral tenderness     Lungs:   No increased work of breathing, good air exchange, clear to auscultation bilaterally, no crackles or wheezing     Cardiovascular:   Normal apical impulse, regular rate and rhythm, normal S1 and S2, no S3 or S4, and no murmur noted     Abdomen:   normal bowel sounds,  soft, non-distended, non-tender, no masses palpated, no hepatosplenomegally     Neurologic:   Awake, alert, oriented to name, place and time.  Cranial nerves II-XII are grossly intact.  Motor is 4 out of 5 bilaterally to  strength and dorsi and plantar flexion of the feet.   Sensory is intact.[DF1.3]        Data[DF1.2]   Data reviewed today:  I personally reviewed[DF1.1] the EKG tracing showing NSR with first degree AV block but no acute ischemic changes[DF1.3].[DF1.1]    Recent Labs  Lab 09/21/17 2003   WBC 5.9   HGB 11.4*   MCV 94   *   *   POTASSIUM 4.0   CHLORIDE 102   CO2 25   BUN 49*   CR 2.75*   ANIONGAP 31*   RAHEL 9.3   *   ALBUMIN 3.6   PROTTOTAL 7.2   BILITOTAL 0.6   ALKPHOS 93   ALT 18   AST 12   TROPI 0.015       Recent Results (from the past 24 hour(s))   XR Chest Port 1 View    Narrative    CHEST PORTABLE ONE VIEW  9/21/2017 9:17 PM     COMPARISON: Frontal chest x-ray 8/16/2016.    HISTORY: Weakness, elevated BNP.    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    The cardiac silhouette, pulmonary vasculature, lungs and pleural  spaces are within normal limits.      Impression    IMPRESSION: Clear lungs.    EUGENIE AGGARWAL MD[DF1.2]          Revision History        User Key Date/Time User Provider Type Action    > DF1.3 9/21/2017 10:46 PM Peter Vann MD Physician Sign     DF1.2 9/21/2017 10:37 PM Peter Vann MD Physician      DF1.1 9/21/2017 10:36 PM Peter Vann MD Physician                   Discharge Summaries     No notes of this type exist for this encounter.         Consult Notes      Consults by Maria Alejandra Leong RN at 9/22/2017 10:56 AM     Author:  Salo, Maria Alejandra, RN Service:  General Medicine Author Type:      Filed:  9/22/2017  1:10 PM Date of Service:  9/22/2017 10:56 AM Creation Time:  9/22/2017 10:56 AM     Status:  Addendum :  Maria Alejandar Leong, RN ()     Consult Orders:    1. Social Work IP Consult [[id number]] ordered by Kurt Carrillo MD at 09/22/17 0840                Care Transition Initial Assessment - RN  Reason For Consult: discharge planning, facility placement   Met with: Patient and Family.    DATA[SN1.1]   Active Problems:    Diabetes mellitus, type 2 (H)    Essential hypertension, benign    CKD (chronic kidney disease) stage 4, 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)    Thrombocytopenia (H)    Cardiac pacemaker in situ    Weakness    Fall    Acute kidney injury (H)    Hypernatremia    Coronary artery disease involving native coronary artery - angio in 2006 with multivessel disease managed medically[SN1.2]       Primary Care Clinic Name: [facility]  Primary Care MD Name: Brent  Contact information and PCP information verified: Yes      ASSESSMENT  Cognitive Status: awake, alert and oriented. Very [place] talked with patient while son was present             Lives With: alone  Living Arrangements: apartment  Quality Of Family Relationships: supportive, involved  Description of Support System: Supportive, Involved   Who is your support system?: Children   Support Assessment: Adequate family and caregiver support   Insurance Concerns: No Insurance issues identified          This writer met with pt and family , introduced self and role. PT evaluate patient and recommended some rehab patient lives alone shad Flanagan stays with him for part of the day and has stayed 24/7 when needed Rachelle Mckeon [facility] [phone]][phone][SN1.3] has an RN come every other week and a HHA 1X a week for personal cares and cleaning. Patient goes to senior dining for lunch and eats the other 2 meals in his apartment. Patient is legally blind and [place] son Panchito said it would be nice to get him back to his apartment where he knows where everything is but he needs to be able to walk. Patient has VA coverage for TCU awaiting a call from Juan raagon   for the [phone]. Writer left message for Jona [phone] at the [facility] to check bed availability.      PLAN    TCU placement CTS to continue to follow      Maria Alejandra Leong CTS RN [facility] [phone] [facility] [phone][SN1.1]    Discharge Planner[SN1.2]   Discharge Plans in progress: TBA   Barriers to discharge plan: bed availability  Follow up plan: Patient will follow up with primary provider after discharge       Entered by:[SN1.1] Maria Alejandra Leong 09/22/2017 10:58 AM[SN1.2]            Revision History        User Key Date/Time User Provider Type Action    > SN1.3 9/22/2017  1:10 PM Maria Alejandra Leong RN Case Manager Addend     SN1.2 9/22/2017 11:10 AM Maria Alejandra Leong RN Case Manager Sign     SN1.1 9/22/2017 10:56 AM Maria Alejandra Leong RN Case Manager                      Progress Notes - Physician (Notes from 09/19/17 through 09/22/17)      Progress Notes by Yariel Woodward PT at 9/22/2017  1:32 PM     Author:  Yariel Woodward PT Service:  (none) Author Type:  Physical Therapist    Filed:  9/22/2017  1:32 PM Date of Service:  9/22/2017  1:32 PM Creation Time:  9/22/2017  1:32 PM    Status:  Signed :  Yariel Woodward PT (Physical Therapist)          09/22/17 0915   Quick Adds   Type of Visit Initial PT Evaluation   Living Environment   Lives With alone   Living Arrangements apartment   Home Accessibility bed and bath on same level   Number of Stairs to Enter Home 0   Number of Stairs Within Home 0   Stair Railings at Home none   Transportation Available car;family or friend will provide   Living Environment Comment Pt lives alone in an apartment, which has multiple floors, however he does not have any stairs to perform. There is an elevator present. All of his needs including bathroom and bedroom are on one floor.    Self-Care   Usual Activity Tolerance fair   Current Activity Tolerance poor   Regular Exercise no   Equipment Currently Used at Home grab bar;glucometer;wheelchair,  power;shower chair;walker, rolling   Activity/Exercise/Self-Care Comment Uses 4WW all of the time when in the house, but occasionally does use his power scooter in the house and when going longer distances.    Functional Level Prior   Ambulation 1-->assistive equipment   Transferring 1-->assistive equipment   Toileting 0-->independent   Bathing 2-->assistive person   Dressing 0-->independent   Eating 0-->independent   Communication 0-->understands/communicates without difficulty   Swallowing 0-->swallows foods/liquids without difficulty   Cognition 0 - no cognition issues reported   Fall history within last six months yes   Number of times patient has fallen within last six months 6   Which of the above functional risks had a recent onset or change? ambulation;transferring;toileting   Prior Functional Level Comment shad Flanagan comes daily at 8-12 pm and stays even longer on weekends. Has a HHA come 1x per week and clean apt and help with a bath. RN comes every other week to check in with his health and help upkeep diabetic foot health.    General Information   Onset of Illness/Injury or Date of Surgery - Date 09/21/17   Referring Physician Dr. Vann   Patient/Family Goals Statement Wants to have more energy, go home at some point   Pertinent History of Current Problem (include personal factors and/or comorbidities that impact the POC) Shaun is a pleasant 89 y/o male admitted to the hospital under Obs status via the ED after a fall at home and being unable to get back up, requiring to call 911. He had an episode yesterday of acute onset weakness after sitting outside on his patio, and fell when his walker got away from him. He lives alone, but gets some help with cleaning the house, bathing, and home nursing weekly. PMH significant for MARQUIS, Stage IV CKD, CAD, Hypernatremia, HTN, falls, DM II, Pacemaker. He has had several of these episodes in the past, however none this in depth. Previous episodes responded well to  "rehydration, per chart review. He does have a son who is willing to stay 24/7 with the pt if this makes it able for him to return home.    Precautions/Limitations fall precautions   Weight-Bearing Status - LUE full weight-bearing   Weight-Bearing Status - RUE full weight-bearing   Weight-Bearing Status - LLE full weight-bearing   Weight-Bearing Status - RLE full weight-bearing   Heart Disease Risk Factors Medical history;Lack of physical activity;High blood pressure;Diabetes   General Observations Cold \"all of the time\", Very fatigued lately and sleeping much more than usual   Cognitive Status Examination   Orientation orientation to person, place and time   Pain Assessment   Patient Currently in Pain No   Integumentary/Edema   Integumentary/Edema no deficits were identifed   Posture    Posture Forward head position;Protracted shoulders   Range of Motion (ROM)   ROM Comment UEs limited overhead by age and strength-related deficits, mild. LEs limited grossly by acute weakness actively, but no gross joint mobility deficits.    Strength   Strength Comments Generalized weakness throughout body LEs > UEs; Quads, DFs, PFs 2/5, bilaterally   Bed Mobility   Bed Mobility Comments Unable to assess, as pt was in chair throughout session and declined to return to bed   Transfer Skills   Transfer Comments Sit <> stand with Total assist, 2-people to perform stand pivot transfer from chair <> bed   Gait   Gait Comments Not appropriate for ambulation yet, as cannot bear weight independently   Balance   Balance Comments Fair sitting balance, able to generate weight shifts in multiple directions. Standing balance poor, LEs unable to bear weight, unable to truly assess   Sensory Examination   Sensory Perception no deficits were identified   Coordination   Coordination no deficits were identified   Muscle Tone   Muscle Tone no deficits were identified   Modality Interventions   Planned Modality Interventions Comments prn   General " "Therapy Interventions   Planned Therapy Interventions balance training;bed mobility training;gait training;ROM;strengthening;transfer training;risk factor education;home program guidelines;progressive activity/exercise   Clinical Impression   Criteria for Skilled Therapeutic Intervention yes, treatment indicated   PT Diagnosis Generalized weakness, Gait impairment, Balance impairment   Influenced by the following impairments Decreased strength, AROM, balance   Functional limitations due to impairments Impaired transfers, bed mobility, ambulation   Clinical Presentation Evolving/Changing   Clinical Presentation Rationale Several episodes of this in the past, History of relatively frequent falls at home, Progressive nature of underlying co-morbidities, well below baseline currently   Clinical Decision Making (Complexity) Moderate complexity   Therapy Frequency` daily   Predicted Duration of Therapy Intervention (days/wks) 1-2 days   Anticipated Discharge Disposition Transitional Care Facility   Risk & Benefits of therapy have been explained Yes   Patient, Family & other staff in agreement with plan of care Yes   Clinical Impression Comments Shaun is a pleasant 89 y/o male admitted to the hospital under Obs status for weakness below baseline with the new inability to walk due to gross bilateral LE weakness. He will benefit from skilled PT services to safely progress activity tolerance and recommend safe discharge disposition as indicated, with ultimate goal of facilitating safe return back home.    [facility] AM-PAC TM \"6 Clicks\"   2016, Trustees of [facility], under license to [employer].  All rights reserved.   6 Clicks Short Forms Basic Mobility Inpatient Short Form   [facility] AM-PAC  \"6 Clicks\" V.2 Basic Mobility Inpatient Short Form   1. Turning from your back to your side while in a flat bed without using bedrails? 2 - A Lot   2. Moving from lying on your back to sitting on the side of a " flat bed without using bedrails? 2 - A Lot   3. Moving to and from a bed to a chair (including a wheelchair)? 1 - Total   4. Standing up from a chair using your arms (e.g., wheelchair, or bedside chair)? 2 - A Lot   5. To walk in hospital room? 1 - Total   6. Climbing 3-5 steps with a railing? 1 - Total   Basic Mobility Raw Score (Score out of 24.Lower scores equate to lower levels of function) 9   Total Evaluation Time   Total Evaluation Time (Minutes) 35         Yariel Woodward PT, DPT        9/22/2017      1:32 PM  [facility]  [phone][TD1.1]             Revision History        User Key Date/Time User Provider Type Action    > TD1.1 9/22/2017  1:32 PM Yariel Woodward PT Physical Therapist Sign            Progress Notes by Hyun Guaman RD at 9/22/2017 12:09 PM     Author:  Hyun Guaman RD Service:  Nutrition Author Type:  Registered Dietitian    Filed:  9/22/2017 12:33 PM Date of Service:  9/22/2017 12:09 PM Creation Time:  9/22/2017 12:09 PM    Status:  Signed :  Hyun Guaman RD (Anjel Sandersitian)         CLINICAL NUTRITION SERVICES  -  ASSESSMENT NOTE    REASON FOR ASSESSMENT  Shaun Gotti is a 89 year old male seen by Registered Dietitian for Admission Nutrition Risk Screen - unintentional weight loss, reduced oral intake.     NUTRITION HISTORY  - Information obtained from[KL1.1] patient and chart.[KL1.2]   - Patient is on a[KL1.1] regular[KL1.2] diet at home[KL1.1].[KL1.2]  - Typical food/fluid intake is[KL1.1] 3 meals/day.[KL1.2]   - Hx of Type 2 Diabetes and CKD Stage 4.[KL1.1] Patient states that he eats what is given to him. He lives in an apartment where the meals are provided for him. He has noticed that his weight has decreased and he thinks it's because he's been eating less at the evening meal due to less of an appetite.[KL1.2]    CURRENT NUTRITION ORDERS  Diet Order:     Moderate Consistent Carbohydrate     Current Intake/Tolerance:  Noted that patient  "ate 100% for breakfast.[KL1.1] Patient ate at least 50% for lunch.[KL1.2]    PHYSICAL FINDINGS  Observed[KL1.1]  No nutrition-related physical findings observed[KL1.2]  Obtained from Chart/Interdisciplinary Team  None noted    ANTHROPOMETRICS  Height: 5' 11\"  Weight: 155 lbs 12.8 oz  Body mass index is 21.73 kg/(m^2).  Weight Status:  Normal BMI  IBW: 172 lbs  % IBW: 90%  Weight History:[KL1.1]   Wt Readings from Last 10 Encounters:   09/21/17 70.7 kg (155 lb 12.8 oz)   02/22/17 79.4 kg (175 lb)   08/17/16 78.5 kg (173 lb 1 oz)   10/12/15 79.5 kg (175 lb 3.2 oz)   08/26/15 79.9 kg (176 lb 3.2 oz)   08/19/15 80.9 kg (178 lb 4.8 oz)   07/04/15 84.9 kg (187 lb 3.2 oz)   05/19/15 82.1 kg (181 lb 1.6 oz)   02/04/14 78.9 kg (174 lb)   04/15/13 75 kg (165 lb 6.4 oz)[KL1.3]   Noted 20 lb weight loss since February, which is 11% of patient's body weight.    LABS  Labs reviewed.    MEDICATIONS  Medications reviewed.    Dosing Weight[KL1.1] 71 kg[KL1.4]    ASSESSED NUTRITION NEEDS PER APPROVED PRACTICE GUIDELINES:  Estimated Energy Needs:[KL1.1] 1775 - 2130[KL1.4] kcals ([KL1.1]25-30 Kcal/Kg[KL1.4])  Justification:[KL1.1] maintenance and repletion[KL1.4]  Estimated Protein Needs:[KL1.1] 57 - 71[KL1.4] grams protein ([KL1.1]0.8-1 g pro/Kg[KL1.4])  Justification:[KL1.1] maintenance, repletion and preservation of lean body mass[KL1.4]  Estimated Fluid Needs:[KL1.1] 1 mL/kcal[KL1.4]   Justification:[KL1.1] maintenance[KL1.4]    MALNUTRITION:  % Weight Loss:[KL1.1]  > 10% in 6 months (severe malnutrition)[KL1.4]  % Intake:[KL1.1]  Decreased intake does not meet criteria for malnutrition. Patient unable to clarify how much his intake has decreased.[KL1.2]   Subcutaneous Fat Loss:[KL1.1]  None observed[KL1.2]  Muscle Loss:[KL1.1]  None observed[KL1.2]  Fluid Retention:[KL1.1]  None noted[KL1.2]    Malnutrition Diagnosis:[KL1.1] Patient does not meet two of the above criteria necessary for diagnosing malnutrition.[KL1.2]    NUTRITION " DIAGNOSIS:[KL1.1]  Unintended weight loss[KL1.2] related to[KL1.1] decreased oral intake and appetite[KL1.2] as evidenced by[KL1.1] 11% loss of body weight over the last 7 months.[KL1.2]    NUTRITION INTERVENTIONS  Recommendations / Nutrition Prescription[KL1.1]  1. Continue to monitor and encourage PO intake.   2. Patient declines nutrition supplements at this time, but can revisit if intake declines while here int  hospital.[KL1.2]     Implementation  Nutrition education:[KL1.1] No education needs assessed at this time  Medical Food Supplement - if intake declines, eating most of meals at this time.[KL1.2]    Nutrition Goals[KL1.1]  Patient to consume > 75% of meals/snacks/beverages to meet nutritional needs.[KL1.2]     MONITORING AND EVALUATION:[KL1.1]  Progress towards goals will be monitored and evaluated per protocol and Practice Guidelines[KL1.2]    Hyun Guaman RDN, LD  Clinical Dietitian  [phone][KL1.1]                 Revision History        User Key Date/Time User Provider Type Action    > KL1.2 9/22/2017 12:33 PM Hyun Guaman RD Registered Dietitian Sign     KL1.4 9/22/2017 12:17 PM Hyun Guaman RD Registered Dietitian      KL1.3 9/22/2017 12:12 PM Hyun Guaman RD Registered Dietitian      KL1.1 9/22/2017 12:09 PM Hyun Guaman RD Registered Dietitian             Progress Notes by Katarina Denise RN at 9/22/2017  5:59 AM     Author:  Katarina Denise RN Service:  (none) Author Type:  Registered Nurse    Filed:  9/22/2017  6:03 AM Date of Service:  9/22/2017  5:59 AM Creation Time:  9/22/2017  5:59 AM    Status:  Addendum :  Katarina Denise RN (Registered Nurse)         PRIMARY DIAGNOSIS: GENERALIZED WEAKNESS      OUTPATIENT/OBSERVATION GOALS TO BE MET BEFORE DISCHARGE   Observation goals PRIOR TO DISCHARGE     Comments: Strength improving, PT eval completed and plan in place for safe disposition   Nurse to notify provider when observation goals have been  met and patient is ready for discharge.          1. Orthostatic performed: N/A    2. Tolerating PO medications: Yes    3. Return to near baseline physical activity: No, patient weak to stand and is unsteady. Normally uses walker at home and motorized chair for longer distances    4. Cleared for discharge by consultants (if involved): N/A    Discharge Planner Nurse   Safe discharge environment identified: No  Barriers to discharge: Yes, physical therapy to see to determine safety upon discharge       Entered by: Katarina Denise 09/22/2017 5:59 AM     Please review provider order for any additional goals.   Nurse to notify provider when observation goals have been met and patient is ready for discharge.[KL1.1]     Revision History        User Key Date/Time User Provider Type Action    > [N/A] 9/22/2017  6:03 AM Katarina Denise RN Registered Nurse Addend     KL1.1 9/22/2017  6:02 AM aKtarina Deinse RN Registered Nurse Sign            Progress Notes by Katarina Denise RN at 9/22/2017  1:09 AM     Author:  Katarina Denise RN Service:  (none) Author Type:  Registered Nurse    Filed:  9/22/2017  1:20 AM Date of Service:  9/22/2017  1:09 AM Creation Time:  9/22/2017  1:09 AM    Status:  Signed :  Katarina Denise RN (Registered Nurse)         S-(situation): Patient registered to Observation.Patient arrived to room 267 via cart from ED    B-(background): weakness and fall    A-(assessment): patient alert and oriented lungs clear, vitals stable and afebrile. He stated that he felt weaker than usual. He is able to lift and move all extremities equal strength and movement. Patient uses a walker at home for ambulation and a motorized chair with longer distances. He stated that he has fallen at least 6 times in the last six months. Fall band inplace    R-(recommendations): Orders and observation goals reviewed with yes    Nursing Observation criteria listed below was met:      Skin issues/needs  documented:Yes  Isolation needs addressed, if appropriate: NA  Fall Prevention: fall risk completed/interventions documented, sign used Yes  Care Plan initiated( With OBS goals): Yes  Education (including assessment) Documented (Reminder to educate patient if MRSA is present on admission): Yes  New medication information given to patient and documented: N/A  Patient has discharge needs (If yes, please explain): Yes, continue current services potential need for PT[KL1.1]         Revision History        User Key Date/Time User Provider Type Action    > KL1.1 9/22/2017  1:20 AM Katarina Denise RN Registered Nurse Sign            ED Notes by Melba Bishop, RN at 9/21/2017 11:05 PM     Author:  Melba Bishop RN Service:  (none) Author Type:  Registered Nurse    Filed:  9/21/2017 11:08 PM Date of Service:  9/21/2017 11:05 PM Creation Time:  9/21/2017 11:08 PM    Status:  Signed :  Melba Bishop RN (Registered Nurse)         ED Nursing criteria listed below was addressed during verbal handoff:     Abnormal vitals: Yes - blood pressure 182/86  Abnormal results: Yes; BNP - see chart  Med Reconciliation completed: Yes  Meds given in ED: No  Any Overdue Meds: No  Core Measures: N/A  Isolation: N/A  Special needs: Yes; fall risk (uses walker at home) legs very weak; using urinal at bedside  Skin assessment: Yes; small abrasion on left knee, backside clear, bruising on abdomen from insulin shots    Observation Patient  Education provided: Yes;Played observation video; patient hard of hearing. Explained. Gave handout.[CR1.1]        Revision History        User Key Date/Time User Provider Type Action    > CR1.1 9/21/2017 11:08 PM Melba Bishop, RN Registered Nurse Sign            ED Provider Notes by Hawk Arteaga MD at 9/21/2017  7:29 PM     Author:  Hawk Arteaga MD Service:  Emergency Medicine Author Type:  Physician    Filed:  9/21/2017 10:06 PM Date of Service:  9/21/2017   "7:29 PM Creation Time:  9/21/2017  7:54 PM    Status:  Signed :  Hawk Arteaga MD (Physician)           History   No chief complaint on file.    HPI  Shaun Gotti is a 89 year old male who presents with some weakness.  Patient was sitting outside for a while and then start to come inside.  He was stuck at the front door because this was a controlled locked door.  Once he got any start to feel somewhat weak and was able to make to his room and he laid down for an hour.  When he got up you went to the kitchen to try make some food but then his walker slipped in his legs gave out he went down to the ground.  He denies any injury or pain.  He was too weak though to get up and called EMS.  Patient has had episodes like this before but usually after an hour or so the weakness resolved this did not seem like it was.  He denies any recent nausea vomiting or diarrhea.  Denies any recent chest pain.  Denies a headache.  Patience is been eating and drinking normally.    I have reviewed the Medications, Allergies, Past Medical and Surgical History, and Social History in the Epic system.        Review of Systems   All other systems reviewed and are negative.      Physical Exam   BP: (!) 173/91  Heart Rate: 81  Temp: 99.3  F (37.4  C)  Height: 177.8 cm (5' 10\")  Weight: 77.1 kg (170 lb)  SpO2: 98 %  Physical Exam   Constitutional: He appears well-developed and well-nourished. No distress.   HENT:   Head: Normocephalic and atraumatic.   Mouth/Throat: Oropharynx is clear and moist. No oropharyngeal exudate.   Eyes: Conjunctivae are normal.   Neck: Normal range of motion. Neck supple. No thyromegaly present.   Cardiovascular: Normal rate, regular rhythm and normal heart sounds.    Pulmonary/Chest: Effort normal and breath sounds normal. No respiratory distress. He has no wheezes.   Abdominal: Soft. Bowel sounds are normal. He exhibits no distension. There is no tenderness.   Skin: Skin is warm and dry. No rash " noted. He is not diaphoretic.   Nursing note and vitals reviewed.      ED Course     ED Course     Procedures[BW1.1]         Results for orders placed or performed during the hospital encounter of 09/21/17   XR Chest Port 1 View    Narrative    CHEST PORTABLE ONE VIEW  9/21/2017 9:17 PM     COMPARISON: Frontal chest x-ray 8/16/2016.    HISTORY: Weakness, elevated BNP.    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    The cardiac silhouette, pulmonary vasculature, lungs and pleural  spaces are within normal limits.      Impression    IMPRESSION: Clear lungs.    EUGENIE AGGARWAL MD   CBC with platelets differential   Result Value Ref Range    WBC 5.9 4.0 - 11.0 10e9/L    RBC Count 3.77 (L) 4.4 - 5.9 10e12/L    Hemoglobin 11.4 (L) 13.3 - 17.7 g/dL    Hematocrit 35.4 (L) 40.0 - 53.0 %    MCV 94 78 - 100 fl    MCH 30.2 26.5 - 33.0 pg    MCHC 32.2 31.5 - 36.5 g/dL    RDW 13.5 10.0 - 15.0 %    Platelet Count 132 (L) 150 - 450 10e9/L    Diff Method Automated Method     % Neutrophils 75.9 %    % Lymphocytes 9.0 %    % Monocytes 12.5 %    % Eosinophils 2.4 %    % Basophils 0.2 %    % Immature Granulocytes 0.0 %    Absolute Neutrophil 4.5 1.6 - 8.3 10e9/L    Absolute Lymphocytes 0.5 (L) 0.8 - 5.3 10e9/L    Absolute Monocytes 0.7 0.0 - 1.3 10e9/L    Absolute Eosinophils 0.1 0.0 - 0.7 10e9/L    Absolute Basophils 0.0 0.0 - 0.2 10e9/L    Abs Immature Granulocytes 0.0 0 - 0.4 10e9/L   Comprehensive metabolic panel   Result Value Ref Range    Sodium 158 (H) 133 - 144 mmol/L    Potassium 4.0 3.4 - 5.3 mmol/L    Chloride 102 94 - 109 mmol/L    Carbon Dioxide 25 20 - 32 mmol/L    Anion Gap 31 (H) 3 - 14 mmol/L    Glucose 155 (H) 70 - 99 mg/dL    Urea Nitrogen 49 (H) 7 - 30 mg/dL    Creatinine 2.75 (H) 0.66 - 1.25 mg/dL    GFR Estimate 22 (L) >60 mL/min/1.7m2    GFR Estimate If Black 26 (L) >60 mL/min/1.7m2     Calcium 9.3 8.5 - 10.1 mg/dL    Bilirubin Total 0.6 0.2 - 1.3 mg/dL    Albumin 3.6 3.4 - 5.0 g/dL    Protein Total 7.2 6.8 - 8.8 g/dL    Alkaline Phosphatase 93 40 - 150 U/L    ALT 18 0 - 70 U/L    AST 12 0 - 45 U/L   Troponin I   Result Value Ref Range    Troponin I ES 0.015 0.000 - 0.045 ug/L   Nt probnp inpatient (BNP)   Result Value Ref Range    N-Terminal Pro BNP Inpatient 3344 (H) 0 - 1800 pg/mL   UA with Microscopic   Result Value Ref Range    Color Urine Yellow     Appearance Urine Clear     Glucose Urine 50 (A) NEG^Negative mg/dL    Bilirubin Urine Negative NEG^Negative    Ketones Urine Negative NEG^Negative mg/dL    Specific Gravity Urine 1.014 1.003 - 1.035    Blood Urine Negative NEG^Negative    pH Urine 5.0 5.0 - 7.0 pH    Protein Albumin Urine 100 (A) NEG^Negative mg/dL    Urobilinogen mg/dL 0.0 0.0 - 2.0 mg/dL    Nitrite Urine Negative NEG^Negative    Leukocyte Esterase Urine Negative NEG^Negative    Source Unspecified Urine     WBC Urine 1 0 - 2 /HPF    RBC Urine 4 (H) 0 - 2 /HPF    Squamous Epithelial /HPF Urine <1 0 - 1 /HPF    Mucous Urine Present (A) NEG^Negative /LPF    Hyaline Casts 1 0 - 2 /LPF     Medications   0.9% sodium chloride BOLUS (500 mLs Intravenous New Bag 9/21/17 7188)     Followed by   0.9% sodium chloride infusion (not administered)[BW1.2]     labs are reviewed and patient did have an elevated BNP and his BUN creatinine seem a little bit worse than normal.  I think patient is somewhat dry witches may be contributing to his weakness.  Because BNP is elevated, I don't want to be too aggressive with fluids so I think bringing the patient and for observation overnight, gentle fluid hydration and reassessment in the morning would be the most prudent option.  I discussed the case with the hospital is and they also agree with this plan.  Talk with family further, it looks like he was on a diuretic recently but somehow this accidentally got discontinued.  This could be one reason why is  BNP is creeping up also.[BW1.3]    Assessments & Plan (with Medical Decision Making)[BW1.1]  weakness, dehydration, CHF[BW1.3]      I have reviewed the nursing notes.    I have reviewed the findings, diagnosis, plan and need for follow up with the patient.        9/21/2017   [facility] EMERGENCY DEPARTMENT[BW1.1]     Hawk Arteaga MD  09/21/17 2206  [BW1.4]     Revision History        User Key Date/Time User Provider Type Action    > BW1.4 9/21/2017 10:06 PM Hawk Arteaga MD Physician Sign     BW1.2 9/21/2017 10:05 PM Hawk Arteaga MD Physician      BW1.3 9/21/2017 10:04 PM Hawk Arteaga MD Physician      BW1.1 9/21/2017  7:54 PM Hawk Arteaga MD Physician             ED Notes by Veda Moralez at 9/21/2017  9:52 PM     Author:  Veda Moralez Service:  (none) Author Type:  Nursing Sta Asst    Filed:  9/21/2017  9:53 PM Date of Service:  9/21/2017  9:52 PM Creation Time:  9/21/2017  9:52 PM    Status:  Signed :  Veda Moralez (Nursing Sta Asst)         Gerardo- from [facility] stated if patient doesn't want to use his VA benefits we don't need to call them. But he did make a note in the patients record.[DY1.1]     Revision History        User Key Date/Time User Provider Type Action    > DY1.1 9/21/2017  9:53 PM Veda Moralez Nursing Sta Asst Sign            ED Notes by Melba Bishop, RN at 9/21/2017  7:40 PM     Author:  Melba Bishop, RN Service:  (none) Author Type:  Registered Nurse    Filed:  9/21/2017  7:43 PM Date of Service:  9/21/2017  7:40 PM Creation Time:  9/21/2017  7:40 PM    Status:  Signed :  Melba Bishop, RN (Registered Nurse)         Patient stated he was abnormally weak today. Was sitting outside in the sun around 1600 and when he came back inside he struggled to unlock the door and pull the door open. When he was getting ready for dinner around 7pm and his walker pushed away from him and he felt his legs give out and he  fell to the floor. Police arrived on scene and had to help him up to the chair. Patient was not able to help the officers when getting up. Patient states he has a soar right shoulder and right side. Rating pain at a 4/10. No LOC[CR1.1]     Revision History        User Key Date/Time User Provider Type Action    > CR1.1 9/21/2017  7:43 PM Melba Bishop, RN Registered Nurse Sign            ED Notes by Dheeraj Cassidy at 9/21/2017  7:29 PM     Author:  Dheeraj Cassidy Service:  (none) Author Type:  Emergency Room Technician    Filed:  9/21/2017  7:29 PM Date of Service:  9/21/2017  7:29 PM Creation Time:  9/21/2017  7:29 PM    Status:  Signed :  Dheeraj Cassidy (Emergency Room Technician)         Bed: ED02  Expected date: 9/21/17  Expected time: 7:30 PM  Means of arrival: Ambulance  Comments:  HOLD for EMS      Revision History        User Key Date/Time User Provider Type Action    > TH1.1 9/21/2017  7:29 PM Dheeraj Cassidy Emergency Room Technician Sign                  Procedure Notes     No notes of this type exist for this encounter.         Progress Notes - Therapies (Notes from 09/19/17 through 09/22/17)      Progress Notes by Yariel Woodward PT at 9/22/2017  1:32 PM     Author:  Yariel Woodward PT Service:  (none) Author Type:  Physical Therapist    Filed:  9/22/2017  1:32 PM Date of Service:  9/22/2017  1:32 PM Creation Time:  9/22/2017  1:32 PM    Status:  Signed :  Yariel Woodward PT (Physical Therapist)          09/22/17 0915   Quick Adds   Type of Visit Initial PT Evaluation   Living Environment   Lives With alone   Living Arrangements apartment   Home Accessibility bed and bath on same level   Number of Stairs to Enter Home 0   Number of Stairs Within Home 0   Stair Railings at Home none   Transportation Available car;family or friend will provide   Living Environment Comment Pt lives alone in an apartment, which has multiple floors, however he does not have any stairs to  perform. There is an elevator present. All of his needs including bathroom and bedroom are on one floor.    Self-Care   Usual Activity Tolerance fair   Current Activity Tolerance poor   Regular Exercise no   Equipment Currently Used at Home grab bar;glucometer;wheelchair, power;shower chair;walker, rolling   Activity/Exercise/Self-Care Comment Uses 4WW all of the time when in the house, but occasionally does use his power scooter in the house and when going longer distances.    Functional Level Prior   Ambulation 1-->assistive equipment   Transferring 1-->assistive equipment   Toileting 0-->independent   Bathing 2-->assistive person   Dressing 0-->independent   Eating 0-->independent   Communication 0-->understands/communicates without difficulty   Swallowing 0-->swallows foods/liquids without difficulty   Cognition 0 - no cognition issues reported   Fall history within last six months yes   Number of times patient has fallen within last six months 6   Which of the above functional risks had a recent onset or change? ambulation;transferring;toileting   Prior Functional Level Comment son Panchito comes daily at 8-12 pm and stays even longer on weekends. Has a HHA come 1x per week and clean apt and help with a bath. RN comes every other week to check in with his health and help upkeep diabetic foot health.    General Information   Onset of Illness/Injury or Date of Surgery - Date 09/21/17   Referring Physician Dr. Vann   Patient/Family Goals Statement Wants to have more energy, go home at some point   Pertinent History of Current Problem (include personal factors and/or comorbidities that impact the POC) Shaun is a pleasant 89 y/o male admitted to the hospital under Obs status via the ED after a fall at home and being unable to get back up, requiring to call 911. He had an episode yesterday of acute onset weakness after sitting outside on his patio, and fell when his walker got away from him. He lives alone, but gets  "some help with cleaning the house, bathing, and home nursing weekly. PMH significant for MARQUIS, Stage IV CKD, CAD, Hypernatremia, HTN, falls, DM II, Pacemaker. He has had several of these episodes in the past, however none this in depth. Previous episodes responded well to rehydration, per chart review. He does have a son who is willing to stay 24/7 with the pt if this makes it able for him to return home.    Precautions/Limitations fall precautions   Weight-Bearing Status - LUE full weight-bearing   Weight-Bearing Status - RUE full weight-bearing   Weight-Bearing Status - LLE full weight-bearing   Weight-Bearing Status - RLE full weight-bearing   Heart Disease Risk Factors Medical history;Lack of physical activity;High blood pressure;Diabetes   General Observations Cold \"all of the time\", Very fatigued lately and sleeping much more than usual   Cognitive Status Examination   Orientation orientation to person, place and time   Pain Assessment   Patient Currently in Pain No   Integumentary/Edema   Integumentary/Edema no deficits were identifed   Posture    Posture Forward head position;Protracted shoulders   Range of Motion (ROM)   ROM Comment UEs limited overhead by age and strength-related deficits, mild. LEs limited grossly by acute weakness actively, but no gross joint mobility deficits.    Strength   Strength Comments Generalized weakness throughout body LEs > UEs; Quads, DFs, PFs 2/5, bilaterally   Bed Mobility   Bed Mobility Comments Unable to assess, as pt was in chair throughout session and declined to return to bed   Transfer Skills   Transfer Comments Sit <> stand with Total assist, 2-people to perform stand pivot transfer from chair <> bed   Gait   Gait Comments Not appropriate for ambulation yet, as cannot bear weight independently   Balance   Balance Comments Fair sitting balance, able to generate weight shifts in multiple directions. Standing balance poor, LEs unable to bear weight, unable to truly " "assess   Sensory Examination   Sensory Perception no deficits were identified   Coordination   Coordination no deficits were identified   Muscle Tone   Muscle Tone no deficits were identified   Modality Interventions   Planned Modality Interventions Comments prn   General Therapy Interventions   Planned Therapy Interventions balance training;bed mobility training;gait training;ROM;strengthening;transfer training;risk factor education;home program guidelines;progressive activity/exercise   Clinical Impression   Criteria for Skilled Therapeutic Intervention yes, treatment indicated   PT Diagnosis Generalized weakness, Gait impairment, Balance impairment   Influenced by the following impairments Decreased strength, AROM, balance   Functional limitations due to impairments Impaired transfers, bed mobility, ambulation   Clinical Presentation Evolving/Changing   Clinical Presentation Rationale Several episodes of this in the past, History of relatively frequent falls at home, Progressive nature of underlying co-morbidities, well below baseline currently   Clinical Decision Making (Complexity) Moderate complexity   Therapy Frequency` daily   Predicted Duration of Therapy Intervention (days/wks) 1-2 days   Anticipated Discharge Disposition Transitional Care Facility   Risk & Benefits of therapy have been explained Yes   Patient, Family & other staff in agreement with plan of care Yes   Clinical Impression Comments Shaun is a pleasant 89 y/o male admitted to the hospital under Obs status for weakness below baseline with the new inability to walk due to gross bilateral LE weakness. He will benefit from skilled PT services to safely progress activity tolerance and recommend safe discharge disposition as indicated, with ultimate goal of facilitating safe return back home.    [facility] AM-PAC TM \"6 Clicks\"   2016, Trustees of [facility], under license to [employer].  All rights reserved.   6 Clicks Short Forms " "Basic Mobility Inpatient Short Form   [facility] AM-PAC  \"6 Clicks\" V.2 Basic Mobility Inpatient Short Form   1. Turning from your back to your side while in a flat bed without using bedrails? 2 - A Lot   2. Moving from lying on your back to sitting on the side of a flat bed without using bedrails? 2 - A Lot   3. Moving to and from a bed to a chair (including a wheelchair)? 1 - Total   4. Standing up from a chair using your arms (e.g., wheelchair, or bedside chair)? 2 - A Lot   5. To walk in hospital room? 1 - Total   6. Climbing 3-5 steps with a railing? 1 - Total   Basic Mobility Raw Score (Score out of 24.Lower scores equate to lower levels of function) 9   Total Evaluation Time   Total Evaluation Time (Minutes) 35         Yariel Woodward PT, DPT        9/22/2017      1:32 PM  [facility]  [phone][TD1.1]             Revision History        User Key Date/Time User Provider Type Action    > TD1.1 9/22/2017  1:32 PM Yariel Woodward PT Physical Therapist Sign            "

## 2017-09-22 PROBLEM — E86.1 HYPOVOLEMIA: Status: ACTIVE | Noted: 2017-01-01

## 2017-09-22 NOTE — ED PROVIDER NOTES
"  History   No chief complaint on file.    HPI  Shaun Gotti is a 89 year old male who presents with some weakness.  Patient was sitting outside for a while and then start to come inside.  He was stuck at the front door because this was a controlled locked door.  Once he got any start to feel somewhat weak and was able to make to his room and he laid down for an hour.  When he got up you went to the kitchen to try make some food but then his walker slipped in his legs gave out he went down to the ground.  He denies any injury or pain.  He was too weak though to get up and called EMS.  Patient has had episodes like this before but usually after an hour or so the weakness resolved this did not seem like it was.  He denies any recent nausea vomiting or diarrhea.  Denies any recent chest pain.  Denies a headache.  Patience is been eating and drinking normally.    I have reviewed the Medications, Allergies, Past Medical and Surgical History, and Social History in the Epic system.        Review of Systems   All other systems reviewed and are negative.      Physical Exam   BP: (!) 173/91  Heart Rate: 81  Temp: 99.3  F (37.4  C)  Height: 177.8 cm (5' 10\")  Weight: 77.1 kg (170 lb)  SpO2: 98 %  Physical Exam   Constitutional: He appears well-developed and well-nourished. No distress.   HENT:   Head: Normocephalic and atraumatic.   Mouth/Throat: Oropharynx is clear and moist. No oropharyngeal exudate.   Eyes: Conjunctivae are normal.   Neck: Normal range of motion. Neck supple. No thyromegaly present.   Cardiovascular: Normal rate, regular rhythm and normal heart sounds.    Pulmonary/Chest: Effort normal and breath sounds normal. No respiratory distress. He has no wheezes.   Abdominal: Soft. Bowel sounds are normal. He exhibits no distension. There is no tenderness.   Skin: Skin is warm and dry. No rash noted. He is not diaphoretic.   Nursing note and vitals reviewed.      ED Course     ED Course     Procedures       "   Results for orders placed or performed during the hospital encounter of 09/21/17   XR Chest Port 1 View    Narrative    CHEST PORTABLE ONE VIEW  9/21/2017 9:17 PM     COMPARISON: Frontal chest x-ray 8/16/2016.    HISTORY: Weakness, elevated BNP.    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    The cardiac silhouette, pulmonary vasculature, lungs and pleural  spaces are within normal limits.      Impression    IMPRESSION: Clear lungs.    EUGENIE AGGARWAL MD   CBC with platelets differential   Result Value Ref Range    WBC 5.9 4.0 - 11.0 10e9/L    RBC Count 3.77 (L) 4.4 - 5.9 10e12/L    Hemoglobin 11.4 (L) 13.3 - 17.7 g/dL    Hematocrit 35.4 (L) 40.0 - 53.0 %    MCV 94 78 - 100 fl    MCH 30.2 26.5 - 33.0 pg    MCHC 32.2 31.5 - 36.5 g/dL    RDW 13.5 10.0 - 15.0 %    Platelet Count 132 (L) 150 - 450 10e9/L    Diff Method Automated Method     % Neutrophils 75.9 %    % Lymphocytes 9.0 %    % Monocytes 12.5 %    % Eosinophils 2.4 %    % Basophils 0.2 %    % Immature Granulocytes 0.0 %    Absolute Neutrophil 4.5 1.6 - 8.3 10e9/L    Absolute Lymphocytes 0.5 (L) 0.8 - 5.3 10e9/L    Absolute Monocytes 0.7 0.0 - 1.3 10e9/L    Absolute Eosinophils 0.1 0.0 - 0.7 10e9/L    Absolute Basophils 0.0 0.0 - 0.2 10e9/L    Abs Immature Granulocytes 0.0 0 - 0.4 10e9/L   Comprehensive metabolic panel   Result Value Ref Range    Sodium 158 (H) 133 - 144 mmol/L    Potassium 4.0 3.4 - 5.3 mmol/L    Chloride 102 94 - 109 mmol/L    Carbon Dioxide 25 20 - 32 mmol/L    Anion Gap 31 (H) 3 - 14 mmol/L    Glucose 155 (H) 70 - 99 mg/dL    Urea Nitrogen 49 (H) 7 - 30 mg/dL    Creatinine 2.75 (H) 0.66 - 1.25 mg/dL    GFR Estimate 22 (L) >60 mL/min/1.7m2    GFR Estimate If Black 26 (L) >60 mL/min/1.7m2    Calcium 9.3 8.5 - 10.1 mg/dL    Bilirubin Total 0.6 0.2 - 1.3 mg/dL    Albumin 3.6 3.4 - 5.0 g/dL    Protein Total 7.2 6.8 -  8.8 g/dL    Alkaline Phosphatase 93 40 - 150 U/L    ALT 18 0 - 70 U/L    AST 12 0 - 45 U/L   Troponin I   Result Value Ref Range    Troponin I ES 0.015 0.000 - 0.045 ug/L   Nt probnp inpatient (BNP)   Result Value Ref Range    N-Terminal Pro BNP Inpatient 3344 (H) 0 - 1800 pg/mL   UA with Microscopic   Result Value Ref Range    Color Urine Yellow     Appearance Urine Clear     Glucose Urine 50 (A) NEG^Negative mg/dL    Bilirubin Urine Negative NEG^Negative    Ketones Urine Negative NEG^Negative mg/dL    Specific Gravity Urine 1.014 1.003 - 1.035    Blood Urine Negative NEG^Negative    pH Urine 5.0 5.0 - 7.0 pH    Protein Albumin Urine 100 (A) NEG^Negative mg/dL    Urobilinogen mg/dL 0.0 0.0 - 2.0 mg/dL    Nitrite Urine Negative NEG^Negative    Leukocyte Esterase Urine Negative NEG^Negative    Source Unspecified Urine     WBC Urine 1 0 - 2 /HPF    RBC Urine 4 (H) 0 - 2 /HPF    Squamous Epithelial /HPF Urine <1 0 - 1 /HPF    Mucous Urine Present (A) NEG^Negative /LPF    Hyaline Casts 1 0 - 2 /LPF     Medications   0.9% sodium chloride BOLUS (500 mLs Intravenous New Bag 9/21/17 2103)     Followed by   0.9% sodium chloride infusion (not administered)     labs are reviewed and patient did have an elevated BNP and his BUN creatinine seem a little bit worse than normal.  I think patient is somewhat dry witches may be contributing to his weakness.  Because BNP is elevated, I don't want to be too aggressive with fluids so I think bringing the patient and for observation overnight, gentle fluid hydration and reassessment in the morning would be the most prudent option.  I discussed the case with the hospital is and they also agree with this plan.  Talk with family further, it looks like he was on a diuretic recently but somehow this accidentally got discontinued.  This could be one reason why is BNP is creeping up also.    Assessments & Plan (with Medical Decision Making)  weakness, dehydration, CHF      I have reviewed the  nursing notes.    I have reviewed the findings, diagnosis, plan and need for follow up with the patient.        9/21/2017   [facility] EMERGENCY DEPARTMENT     Hawk Arteaga MD  09/21/17 [id number]

## 2017-09-22 NOTE — H&P
[facility]    History and Physical  Hospitalist       Date of Admission:  9/21/2017  Date of Service (when I saw the patient): 09/21/17    Assessment & Plan       Active Problems:    Weakness    Assessment: likely secondary to volume depletion. He admittedly does not drink much water and apparently was just recently taken off of a diuretic.  He has an associated MARQUIS with hypernatremia and appears very dry on exam.  He had a similar episode one year ago and responded dramatically to fluids overnight.  He has not had any CP, SOB, focal neurologic symptoms to suggest cardiac or neurologic etiologies.  He has only mild anemia that is chronic and stable.      Plan: register to observation, IV fluids, PT evaluation tomorrow morning, follow intake and output closely and recheck basic profile tomorrow morning      Fall    Assessment: due to his weakness. He sustained no apparent injuries but was too weak to get back up again    Plan: PT eval      Diabetes mellitus, type 2 (H)    Assessment: chronic    Plan: ADA diet, continue home medications and follow blood sugars ac and hs      CKD (chronic kidney disease) stage 4, 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)    Assessment: Creatinine higher than normal related to MARQUIS    Plan: see below      Acute kidney injury (H)    Assessment: likely secondary to volume depetion    Plan: IV fluids, follow intake and output and recheck basic profile tomorrow morning      Hypernatremia    Assessment: appears dehydrated    Plan: 1/2 NS and recheck tomorrow morning      Coronary artery disease involving native coronary artery - angio in 2006 with multivessel disease managed medically    Assessment: no CP or SOB with normal troponin and EKG without acute ischemic changes    Plan: no acute intervention      Essential hypertension, benign    Assessment: chronic    Plan: continue home medications      Thrombocytopenia (H)    Assessment: chronic and appears stable    Plan: no need to  follow      Cardiac pacemaker in situ    Assessment: noted past history    Plan: no acute intervention    DVT Prophylaxis: anticipate less than 24 hour stay  Code Status: DNR    Disposition: Expected discharge in 1 days once PT eval completed and plan in place for safe disposition and creatinine and sodium improving.    Peter Vann MD    Primary Care Physician   Sony Berkowitz    Chief Complaint   Weak    History is obtained from the patient    History of Present Illness   Shaun Gotti is a 89 year old male who presents with weakness. He started to feel somewhat weak yesterday.  Today however his legs got so weak he fell to the ground. He thinks he might have caught the edge of a counter top on the way down but did not sustain any injuries.  He did not lose consciousness.  He tried getting up but his legs were too weak so he was brought to the ED. He did not have any focal weakness, changes in speech, CP or SOB.  He admits he doesn't drink much water.  He denies excessive urination and denies diarrhea or vomiting.  He was recently taken off of a diuretic but details as to why are not known.     Past Medical History    I have reviewed this patient's medical history and updated it with pertinent information if needed.   Past Medical History:   Diagnosis Date     Bronchitis, not specified as acute or chronic 09/26/10    Admitted. Lower respiratory infection, severe weakness.     Chronic kidney disease (CKD)      Coronary atherosclerosis of native coronary artery 1/22/2010    Admitted. Syncopal spell and substernal chest pain.     Coronary atherosclerosis of unspecified type of vessel, native or graft      Hernia of unspecified site of abdominal cavity without mention of obstruction or gangrene     left inguinal hernia     Other and unspecified hyperlipidemia      Prostatitis, unspecified     Chronic prostatitis     Type II or unspecified type diabetes mellitus without mention of complication, not stated as  uncontrolled      Unspecified essential hypertension        Past Surgical History   I have reviewed this patient's surgical history and updated it with pertinent information if needed.  Past Surgical History:   Procedure Laterality Date     CARDIAC CATHERIZATION  10/23/2006    [facility]     HC REMOVAL GALLBLADDER      Cholecystectomy     HC S&I FLUORO PACEMAKER       HERNIA REPAIR         Prior to Admission Medications   Prior to Admission Medications   Prescriptions Last Dose Informant Patient Reported? Taking?   AMLODIPINE BESYLATE 10 MG OR TABS 2017 at 1000  Yes Yes   Si TABLET DAILY   ASPIRIN 81 MG OR TABS 2017 at 0800  Yes Yes   Si TABLET DAILY   ATORVASTATIN CALCIUM PO 2017 at 0800  Yes Yes   Sig: Take 20 mg by mouth daily   B Complex-C-Folic Acid (RENAL MULTIVITAMIN FORMULA PO) 2017 at 0800  Yes Yes   Sig: Take 1 capsule by mouth every morning   CLOPIDOGREL BISULFATE 75 MG OR TABS 2017 at 0800  No Yes   Si TABLET DAILY   FINASTERIDE 5 MG OR TABS 2017 at 0800  No Yes   Si TABLET DAILY   METOPROLOL SUCCINATE PO 2017 at 0800  Yes Yes   Sig: Take 50 mg by mouth daily.   Multiple Vitamins-Minerals (PRESERVISION AREDS 2 PO) 2017 at 0800  Yes Yes   Sig: Take 2 tablets by mouth daily One in AM; one in PM   NITROGLYCERIN 0.4 MG SL SUBL Unknown at Unknown time  Yes No   Si TAB EVERY 5 MIN AS NEEDED FOR CHEST PAIN, UP TO 3 PER EPISODE   OMEPRAZOLE 20 MG OR CPDR 2017 at 0800  Yes Yes   Si CAPSULE DAILY   ORDER FOR DME 2017 at Unknown time  No Yes   Sig: Equipment being ordered: 4 wheel walker with brakes and seat   ORDER FOR DME 2017 at Unknown time  No Yes   Sig: Equipment being ordered: walker   ORDER FOR DME 2017 at Unknown time  No Yes   Sig: Equipment being ordered: transfer bridge   TAMSULOSIN HCL CAPS 0.4 MG OR 2017 at 0800  Yes Yes   Si CAPSULE DAILY   VITAMIN D, CHOLECALCIFEROL, PO 2017 at 0800  Yes Yes    Sig: Take 1,000 Units by mouth 2 times daily. Take 2 tablets twice daily for 30 days, then take 1 tablet twice daily.   acetaminophen (TYLENOL) 500 MG tablet 2017 at 1200  Yes Yes   Si TABLET THREE TIMES A DAY. DO NOT EXCEED 4000MG OF ACETAMINOPHEN IN A 24 HOUR PERIOD.   hypromellose (GENTEAL) 0.3 % SOLN 2017 at 1000  Yes Yes   Si drop 4 times daily   insulin glargine (LANTUS VIAL) 100 UNIT/ML injection 2017 at 1000  Yes Yes   Sig: Inject 20 Units Subcutaneous every morning    sertraline (ZOLOFT) 25 MG tablet 2017 at 0800  Yes Yes   Sig: Take 1 tablet (25 mg) by mouth daily      Facility-Administered Medications: None     Allergies   Allergies   Allergen Reactions     Fluoxetine      Lisinopril        Social History   I have reviewed this patient's social history and updated it with pertinent information if needed. Shaun Gotti  reports that he has never smoked. He has never used smokeless tobacco. He reports that he does not drink alcohol or use illicit drugs.    Family History   I have reviewed this patient's family history and updated it with pertinent information if needed.   Family History   Problem Relation Age of Onset     CANCER Father      prostate cancer       Review of Systems   The 10 point Review of Systems is negative other than noted in the HPI or here.     Physical Exam   Temp: 99.3  F (37.4  C) Temp src: Oral BP: (!) 173/91   Heart Rate: 81   SpO2: 98 % O2 Device: None (Room air)    Vital Signs with Ranges  Temp:  [99.3  F (37.4  C)] 99.3  F (37.4  C)  Heart Rate:  [81] 81  BP: (173)/(91) 173/91  SpO2:  [98 %] 98 %  170 lbs 0 oz    Constitutional:   awake, alert, cooperative, no apparent distress, and appears stated age     Eyes:   Lids and lashes normal, pupils equal, round and reactive to light, extra ocular muscles intact, sclera clear, conjunctiva normal     ENT:   Normocephalic, without obvious abnormality, atraumatic, sinuses nontender on palpation, external  ears without lesions, oral pharynx with very dry mucous membranes, tonsils without erythema or exudates, gums normal and good dentition.     Neck:   Supple, symmetrical, trachea midline, no adenopathy, thyroid symmetric, not enlarged and no tenderness, skin normal     Hematologic / Lymphatic:   no cervical lymphadenopathy and no supraclavicular lymphadenopathy     Back:   Symmetric, no curvature, spinous processes are non-tender on palpation, paraspinous muscles are non-tender on palpation, no costal vertebral tenderness     Lungs:   No increased work of breathing, good air exchange, clear to auscultation bilaterally, no crackles or wheezing     Cardiovascular:   Normal apical impulse, regular rate and rhythm, normal S1 and S2, no S3 or S4, and no murmur noted     Abdomen:   normal bowel sounds, soft, non-distended, non-tender, no masses palpated, no hepatosplenomegally     Neurologic:   Awake, alert, oriented to name, place and time.  Cranial nerves II-XII are grossly intact.  Motor is 4 out of 5 bilaterally to  strength and dorsi and plantar flexion of the feet.   Sensory is intact.        Data   Data reviewed today:  I personally reviewed the EKG tracing showing NSR with first degree AV block but no acute ischemic changes.    Recent Labs  Lab 09/21/17 2003   WBC 5.9   HGB 11.4*   MCV 94   *   *   POTASSIUM 4.0   CHLORIDE 102   CO2 25   BUN 49*   CR 2.75*   ANIONGAP 31*   RAHEL 9.3   *   ALBUMIN 3.6   PROTTOTAL 7.2   BILITOTAL 0.6   ALKPHOS 93   ALT 18   AST 12   TROPI 0.015       Recent Results (from the past 24 hour(s))   XR Chest Port 1 View    Narrative    CHEST PORTABLE ONE VIEW  9/21/2017 9:17 PM     COMPARISON: Frontal chest x-ray 8/16/2016.    HISTORY: Weakness, elevated BNP.    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    The cardiac silhouette, pulmonary  vasculature, lungs and pleural  spaces are within normal limits.      Impression    IMPRESSION: Clear lungs.    EUGENIE AGGARWAL MD

## 2017-09-22 NOTE — ED NOTES
ED Nursing criteria listed below was addressed during verbal handoff:     Abnormal vitals: Yes - blood pressure 182/86  Abnormal results: Yes; BNP - see chart  Med Reconciliation completed: Yes  Meds given in ED: No  Any Overdue Meds: No  Core Measures: N/A  Isolation: N/A  Special needs: Yes; fall risk (uses walker at home) legs very weak; using urinal at bedside  Skin assessment: Yes; small abrasion on left knee, backside clear, bruising on abdomen from insulin shots    Observation Patient  Education provided: Yes;Played observation video; patient hard of hearing. Explained. Gave handout.

## 2017-09-22 NOTE — PLAN OF CARE
Problem: Patient Care Overview  Goal: Plan of Care/Patient Progress Review  Patient is a 89 year old year old male admitted to the hospital via the ED admitted under observation status for acute onset weakness with a fall at home. Prior to admission, patient was living in an apartment alone with 0 stairs to enter, using combination of powered mobility and 4WW ambulation for mobility, and requiring home health aide and family assist for ADLs including cooking meals, cleaning the apartment and bathing. Currently, patient is requiring Max assistance for functional mobility and Total for ambulation using 2WW, 2-person assist. Barriers to return to previous living situation include Level of assist for functional mobility, not yet appropriate to tolerate gait.  Patient equipment needs at discharge include TBD.  Recommend discharge to TCU recommended at this time to progress safety with functional mobility and ambulation with goal of facilitating safe return home, and to reduce the risk of hospital readmission. If pt does not have coverage his family may choose to prefer returning home with 24/hr assist, which his son could provide, however with current 2-person max assist transfers, this likely is not the safest situation at this time for long-term viability. Recommend Medical van transport.      Pt has had acute weakness episodes which responded well to rehydration in the past. Will attempt to see pt again this afternoon to see if mobility is improved. Recommend stand pivot transfers with 2-assist nursing staff and walk for commode needs, and bedside transfers at this time as needed.        Physical Therapy Discharge Summary    Reason for therapy discharge:    Discharged to transitional care facility.    Progress towards therapy goal(s). See goals on Care Plan in [facility] electronic health record for goal details.  Goals not met.  Barriers to achieving goals:   discharge on same date as initial evaluation.    Therapy  recommendation(s):    Continued therapy is recommended.  Rationale/Recommendations:  Will benefit from continued skilled therapy to progress safety and independence with functional mobility and ambulation and to facilitate safe return to home environment.        Yariel Woodward PT, DPT        9/22/2017      11:11 AM  [facility]  [phone]

## 2017-09-22 NOTE — PROGRESS NOTES
S-(situation): Patient registered to Observation.Patient arrived to room 267 via cart from ED    B-(background): weakness and fall    A-(assessment): patient alert and oriented lungs clear, vitals stable and afebrile. He stated that he felt weaker than usual. He is able to lift and move all extremities equal strength and movement. Patient uses a walker at home for ambulation and a motorized chair with longer distances. He stated that he has fallen at least 6 times in the last six months. Fall band inplace    R-(recommendations): Orders and observation goals reviewed with yes    Nursing Observation criteria listed below was met:      Skin issues/needs documented:Yes  Isolation needs addressed, if appropriate: NA  Fall Prevention: fall risk completed/interventions documented, sign used Yes  Care Plan initiated( With OBS goals): Yes  Education (including assessment) Documented (Reminder to educate patient if MRSA is present on admission): Yes  New medication information given to patient and documented: N/A  Patient has discharge needs (If yes, please explain): Yes, continue current services potential need for PT

## 2017-09-22 NOTE — PROGRESS NOTES
Care Coordinator- Discharge Planning     Admission Date/Time:  9/21/2017  Attending MD:  Kurt Carrillo MD     Data  Date of initial CC assessment:  9/22/17  Chart reviewed, discussed with interdisciplinary team.   Patient was admitted for:   1. Weakness    2. Dehydration    3. Congestive heart failure, unspecified congestive heart failure chronicity, unspecified congestive heart failure type (H)         Assessment  Full assessment completed in previous note    Coordination of Care and Referrals: Provided patient/family with options for Skilled Nursing Facility, TCU.  PAS-RR    D: Per DHS regulation, SW completed and submitted PAS-RR to [place] [employer] Direct Connect via the [employer] Line.  PAS-RR confirmation # is : [id number]    I: SW spoke with Son Panchito and they are aware a PAS-RR has been submitted.  SW reviewed with Patient and Panchito that they may be contacted for a follow up appointment within 10 days of hospital discharge if their SNF stay is < 30 days.  Contact information for [facility] was also provided.    A: Panchito verbalized understanding.    P: Further questions may be directed to [facility] at #[phone], option #4 for PAS-RR staff.        Plan  Anticipated Discharge Date:  9/22/17  Anticipated Discharge Plan:  Discharged to [facility] via [employer] car    CTS Handoff completed:  [facility] needed    Maria Alejandra Leong RN

## 2017-09-22 NOTE — DISCHARGE SUMMARY
[facility] Observation Discharge Summary    Shaun Gotti MRN# [id number]   Age: 89 year old YOB: 1925     Date of Observation:  9/21/2017  Date of Discharge:  9/22/2017   Initial Physician:  Peter Vann MD  Discharge Physician:  Kurt Carrillo MD     Home clinic: [facility], [facility]            Admission Diagnoses:   Dehydration [E86.0]  Weakness [R53.1]  Congestive heart failure, unspecified congestive heart failure chronicity, unspecified congestive heart failure type (H) [I50.9]          Discharge Diagnoses:   Principal diagnosis: Hypovolemia    Secondary diagnoses:    Hypovolemia    Weakness    Fall    Acute kidney injury (H)    Hypernatremia    Diabetes mellitus, type 2 (H)    Essential hypertension, benign    CKD (chronic kidney disease) stage 4, 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)    Thrombocytopenia (H)    Cardiac pacemaker in situ    Coronary artery disease involving native coronary artery - angio in 2006 with multivessel disease managed medically    * No resolved hospital problems. *            Procedures:   No procedures performed during this hospital stay           Discharge Medications:     Outpatient Prescriptions Marked as Taking for the 9/21/17 encounter (Hospital Encounter)   Medication Sig     Multiple Vitamins-Minerals (PRESERVISION AREDS 2 PO) Take 2 tablets by mouth daily One in AM; one in PM     B Complex-C-Folic Acid (RENAL MULTIVITAMIN FORMULA PO) Take 1 capsule by mouth every morning     sertraline (ZOLOFT) 25 MG tablet Take 1 tablet (25 mg) by mouth daily     ATORVASTATIN CALCIUM PO Take 20 mg by mouth daily     hypromellose (GENTEAL) 0.3 % SOLN 1 drop 4 times daily     ORDER FOR DME Equipment being ordered: walker     ORDER FOR DME Equipment being ordered: transfer bridge     ORDER FOR DME Equipment being ordered: 4 wheel walker with brakes and seat     insulin glargine (LANTUS VIAL) 100 UNIT/ML injection Inject 20 Units Subcutaneous every morning       VITAMIN D, CHOLECALCIFEROL, PO Take 1,000 Units by mouth 2 times daily. Take 2 tablets twice daily for 30 days, then take 1 tablet twice daily.     METOPROLOL SUCCINATE PO Take 50 mg by mouth daily.     acetaminophen (TYLENOL) 500 MG tablet 1 TABLET THREE TIMES A DAY. DO NOT EXCEED 4000MG OF ACETAMINOPHEN IN A 24 HOUR PERIOD.     AMLODIPINE BESYLATE 10 MG OR TABS 1 TABLET DAILY     TAMSULOSIN HCL CAPS 0.4 MG OR 1 CAPSULE DAILY     ASPIRIN 81 MG OR TABS 1 TABLET DAILY     OMEPRAZOLE 20 MG OR CPDR 1 CAPSULE DAILY     FINASTERIDE 5 MG OR TABS 1 TABLET DAILY     CLOPIDOGREL BISULFATE 75 MG OR TABS 1 TABLET DAILY       Current Discharge Medication List      CONTINUE these medications which have NOT CHANGED    Details   Multiple Vitamins-Minerals (PRESERVISION AREDS 2 PO) Take 2 tablets by mouth daily One in AM; one in PM      B Complex-C-Folic Acid (RENAL MULTIVITAMIN FORMULA PO) Take 1 capsule by mouth every morning      sertraline (ZOLOFT) 25 MG tablet Take 1 tablet (25 mg) by mouth daily  Qty: 30 tablet      ATORVASTATIN CALCIUM PO Take 20 mg by mouth daily      hypromellose (GENTEAL) 0.3 % SOLN 1 drop 4 times daily      !! ORDER FOR DME Equipment being ordered: walker  Qty: 1 Device, Refills: 0    Associated Diagnoses: Sepsis(995.91); Falls frequently      !! ORDER FOR DME Equipment being ordered: transfer bridge  Qty: 1 Device, Refills: 0    Associated Diagnoses: Sepsis(995.91); Falls frequently      !! ORDER FOR DME Equipment being ordered: 4 wheel walker with brakes and seat  Qty: 1 Device, Refills: 0    Associated Diagnoses: Low back pain; Fall, initial encounter      insulin glargine (LANTUS VIAL) 100 UNIT/ML injection Inject 20 Units Subcutaneous every morning       VITAMIN D, CHOLECALCIFEROL, PO Take 1,000 Units by mouth 2 times daily. Take 2 tablets twice daily for 30 days, then take 1 tablet twice daily.      METOPROLOL SUCCINATE PO Take 50 mg by mouth daily.      acetaminophen (TYLENOL) 500 MG tablet 1  TABLET THREE TIMES A DAY. DO NOT EXCEED 4000MG OF ACETAMINOPHEN IN A 24 HOUR PERIOD.      AMLODIPINE BESYLATE 10 MG OR TABS 1 TABLET DAILY      TAMSULOSIN HCL CAPS 0.4 MG OR 1 CAPSULE DAILY      ASPIRIN 81 MG OR TABS 1 TABLET DAILY      OMEPRAZOLE 20 MG OR CPDR 1 CAPSULE DAILY      FINASTERIDE 5 MG OR TABS 1 TABLET DAILY  Qty: 30, Refills: 0      CLOPIDOGREL BISULFATE 75 MG OR TABS 1 TABLET DAILY      NITROGLYCERIN 0.4 MG SL SUBL 1 TAB EVERY 5 MIN AS NEEDED FOR CHEST PAIN, UP TO 3 PER EPISODE       !! - Potential duplicate medications found. Please discuss with provider.                Consultations:   No consultations were requested during this hospital stay          Brief History of Illness:   89 year old male patient with multiple chronic medical problems including diabetes, hypertension, severe chronic kidney disease, and CAD presented with 1 days history of worsening weakness with fall at home after which he could not get up.   Because of concern for persistent weakness despite initial treatment in the emergency room and abnormal test results including hypernatremia, your physician recommended hospitalization for observation. See ER note and history and physical for details.          Hospital Course:   Patient was observed in the hospital.  Serum sodium was 158, and creatinine was 2.75 which was increased compared with his usual baseline.  He appeared hypovolemic clinically although was stable hemodynamically.  He was treated with IV fluids with improvement in volume status.  Serum sodium returned to normal and creatinine improved closer to his baseline.  He tolerated advancing diet without difficulty and was voiding spontaneously.  He was evaluated by physical therapy and continued to be weak with unsteady gait requiring assistance.  Discharge to a transitional care unit for anticipated short-term rehabilitation was recommended with which he and his son were agreeable.  His case was discussed by telephone  today with Ayala, [facility] who also expressed agreement with transfer.  He was evaluated by a nutritionist without any new specific recommendations at this time.  During this hospital stay, his son reported that the patient had been taking a daily dose of a diuretic up until the time of this hospital stay for treatment of hypertension.  They were advised that diuretic therapy should be discontinued at this time because of concern that risk of hypovolemia and hypernatremia outweighed potential benefit for treatment of hypertension.  Improved oral intake of fluids was recommended.  He had an otherwise unremarkable clinical course during this hospital stay.    I evaluated this patient on the day of discharge.  He appeared to be resting comfortably in bed.  He remained afebrile with stable vital signs.  Heart rate was regular with regular rhythm.  Radial pulses strong and capillary refill was normal.  Lungs were clear.  He was alert and appeared to maintain wakefulness and attention.  He answered questions appropriately.            Discharge Instructions and Follow-Up:   Discharge diet: Moderate consistent carbohydrate (2800-8075 mary / 4-6 CHO units per meal)   Discharge activity: Activity as tolerated   Discharge follow-up:  follow-up with nursing home provider       Pending test results: none         Discharge Disposition:   Discharged to nursing home      Attestation:  I have reviewed today's vital signs, notes, medications, and test results.    Kurt Carrillo MD

## 2017-09-22 NOTE — ED NOTES
Gerardo- from [facility] stated if patient doesn't want to use his VA benefits we don't need to call them. But he did make a note in the patients record.

## 2017-09-22 NOTE — ED NOTES
Patient stated he was abnormally weak today. Was sitting outside in the sun around 1600 and when he came back inside he struggled to unlock the door and pull the door open. When he was getting ready for dinner around 7pm and his walker pushed away from him and he felt his legs give out and he fell to the floor. Police arrived on scene and had to help him up to the chair. Patient was not able to help the officers when getting up. Patient states he has a soar right shoulder and right side. Rating pain at a 4/10. No LOC

## 2017-09-22 NOTE — PROGRESS NOTES
"CLINICAL NUTRITION SERVICES  -  ASSESSMENT NOTE    REASON FOR ASSESSMENT  Shaun Gotti is a 89 year old male seen by Registered Dietitian for Admission Nutrition Risk Screen - unintentional weight loss, reduced oral intake.     NUTRITION HISTORY  - Information obtained from patient and chart.   - Patient is on a regular diet at home.  - Typical food/fluid intake is 3 meals/day.   - Hx of Type 2 Diabetes and CKD Stage 4. Patient states that he eats what is given to him. He lives in an apartment where the meals are provided for him. He has noticed that his weight has decreased and he thinks it's because he's been eating less at the evening meal due to less of an appetite.    CURRENT NUTRITION ORDERS  Diet Order:     Moderate Consistent Carbohydrate     Current Intake/Tolerance:  Noted that patient ate 100% for breakfast. Patient ate at least 50% for lunch.    PHYSICAL FINDINGS  Observed  No nutrition-related physical findings observed  Obtained from Chart/Interdisciplinary Team  None noted    ANTHROPOMETRICS  Height: 5' 11\"  Weight: 155 lbs 12.8 oz  Body mass index is 21.73 kg/(m^2).  Weight Status:  Normal BMI  IBW: 172 lbs  % IBW: 90%  Weight History:   Wt Readings from Last 10 Encounters:   09/21/17 70.7 kg (155 lb 12.8 oz)   02/22/17 79.4 kg (175 lb)   08/17/16 78.5 kg (173 lb 1 oz)   10/12/15 79.5 kg (175 lb 3.2 oz)   08/26/15 79.9 kg (176 lb 3.2 oz)   08/19/15 80.9 kg (178 lb 4.8 oz)   07/04/15 84.9 kg (187 lb 3.2 oz)   05/19/15 82.1 kg (181 lb 1.6 oz)   02/04/14 78.9 kg (174 lb)   04/15/13 75 kg (165 lb 6.4 oz)   Noted 20 lb weight loss since February, which is 11% of patient's body weight.    LABS  Labs reviewed.    MEDICATIONS  Medications reviewed.    Dosing Weight 71 kg    ASSESSED NUTRITION NEEDS PER APPROVED PRACTICE GUIDELINES:  Estimated Energy Needs: 1775 - 2130 kcals (25-30 Kcal/Kg)  Justification: maintenance and repletion  Estimated Protein Needs: 57 - 71 grams protein (0.8-1 g " pro/Kg)  Justification: maintenance, repletion and preservation of lean body mass  Estimated Fluid Needs: 1 mL/kcal   Justification: maintenance    MALNUTRITION:  % Weight Loss:  > 10% in 6 months (severe malnutrition)  % Intake:  Decreased intake does not meet criteria for malnutrition. Patient unable to clarify how much his intake has decreased.   Subcutaneous Fat Loss:  None observed  Muscle Loss:  None observed  Fluid Retention:  None noted    Malnutrition Diagnosis: Patient does not meet two of the above criteria necessary for diagnosing malnutrition.    NUTRITION DIAGNOSIS:  Unintended weight loss related to decreased oral intake and appetite as evidenced by 11% loss of body weight over the last 7 months.    NUTRITION INTERVENTIONS  Recommendations / Nutrition Prescription  1. Continue to monitor and encourage PO intake.   2. Patient declines nutrition supplements at this time, but can revisit if intake declines while here int  hospital.     Implementation  Nutrition education: No education needs assessed at this time  Medical Food Supplement - if intake declines, eating most of meals at this time.    Nutrition Goals  Patient to consume > 75% of meals/snacks/beverages to meet nutritional needs.     MONITORING AND EVALUATION:  Progress towards goals will be monitored and evaluated per protocol and Practice Guidelines    Hyun Guaman RDN, LD  Clinical Dietitian  [phone]

## 2017-09-22 NOTE — CONSULTS
Care Transition Initial Assessment - RN  Reason For Consult: discharge planning, facility placement   Met with: Patient and Family.    DATA   Active Problems:    Diabetes mellitus, type 2 (H)    Essential hypertension, benign    CKD (chronic kidney disease) stage 4, 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)    Thrombocytopenia (H)    Cardiac pacemaker in situ    Weakness    Fall    Acute kidney injury (H)    Hypernatremia    Coronary artery disease involving native coronary artery - angio in 2006 with multivessel disease managed medically       Primary Care Clinic Name: [facility]  Primary Care MD Name: Brent  Contact information and PCP information verified: Yes      ASSESSMENT  Cognitive Status: awake, alert and oriented. Very Unga talked with patient while son was present             Lives With: alone  Living Arrangements: apartment  Quality Of Family Relationships: supportive, involved  Description of Support System: Supportive, Involved   Who is your support system?: Children   Support Assessment: Adequate family and caregiver support   Insurance Concerns: No Insurance issues identified          This writer met with pt and family , introduced self and role. PT evaluate patient and recommended some rehab patient lives alone shad Flanagan stays with him for part of the day and has stayed 24/7 when needed Rachelle Mckeon [facility] [phone] has an RN come every other week and a HHA 1X a week for personal cares and cleaning. Patient goes to senior dining for lunch and eats the other 2 meals in his apartment. Patient is legally blind and Unga son Panchito said it would be nice to get him back to his apartment where he knows where everything is but he needs to be able to walk. Patient has VA coverage for TCU awaiting a call from Juan aragon  for the [phone]. Writer left message for Joan [phone] at the [facility] to check bed availability.      PLAN    TCU placement CTS to continue to follow      Maria Alejandra Leong CTS RN  [facility] [phone] [facility] [phone]    Discharge Planner   Discharge Plans in progress: TBA   Barriers to discharge plan: bed availability  Follow up plan: Patient will follow up with primary provider after discharge       Entered by: Maria Alejandra Leong 09/22/2017 10:58 AM

## 2017-09-22 NOTE — PROGRESS NOTES
Shaun Gotti  Gender: male  : 1925  [address]   [facility] [id number] [phone] (home) NONE (work)    Medical Record: [id number]  Pharmacy:    [id number] - [place], [place] - [address] [facility] - [place], [place] - [address]  [facility]  Primary Care Provider: Sony Berkowitz    Parent's names are: Data Unavailable (mother) and Data Unavailable (father).      [facility]  2017     Patient discharged to [facility]

## 2017-09-22 NOTE — PROGRESS NOTES
09/22/17 0915   Quick Adds   Type of Visit Initial PT Evaluation   Living Environment   Lives With alone   Living Arrangements apartment   Home Accessibility bed and bath on same level   Number of Stairs to Enter Home 0   Number of Stairs Within Home 0   Stair Railings at Home none   Transportation Available car;family or friend will provide   Living Environment Comment Pt lives alone in an apartment, which has multiple floors, however he does not have any stairs to perform. There is an elevator present. All of his needs including bathroom and bedroom are on one floor.    Self-Care   Usual Activity Tolerance fair   Current Activity Tolerance poor   Regular Exercise no   Equipment Currently Used at Home grab bar;glucometer;wheelchair, power;shower chair;walker, rolling   Activity/Exercise/Self-Care Comment Uses 4WW all of the time when in the house, but occasionally does use his power scooter in the house and when going longer distances.    Functional Level Prior   Ambulation 1-->assistive equipment   Transferring 1-->assistive equipment   Toileting 0-->independent   Bathing 2-->assistive person   Dressing 0-->independent   Eating 0-->independent   Communication 0-->understands/communicates without difficulty   Swallowing 0-->swallows foods/liquids without difficulty   Cognition 0 - no cognition issues reported   Fall history within last six months yes   Number of times patient has fallen within last six months 6   Which of the above functional risks had a recent onset or change? ambulation;transferring;toileting   Prior Functional Level Comment shad Flanagan comes daily at 8-12 pm and stays even longer on weekends. Has a HHA come 1x per week and clean apt and help with a bath. RN comes every other week to check in with his health and help upkeep diabetic foot health.    General Information   Onset of Illness/Injury or Date of Surgery - Date 09/21/17   Referring Physician Dr. Vann   Patient/Family Goals Statement  "Wants to have more energy, go home at some point   Pertinent History of Current Problem (include personal factors and/or comorbidities that impact the POC) Shaun is a pleasant 89 y/o male admitted to the hospital under Obs status via the ED after a fall at home and being unable to get back up, requiring to call 911. He had an episode yesterday of acute onset weakness after sitting outside on his patio, and fell when his walker got away from him. He lives alone, but gets some help with cleaning the house, bathing, and home nursing weekly. PMH significant for MARQUIS, Stage IV CKD, CAD, Hypernatremia, HTN, falls, DM II, Pacemaker. He has had several of these episodes in the past, however none this in depth. Previous episodes responded well to rehydration, per chart review. He does have a son who is willing to stay 24/7 with the pt if this makes it able for him to return home.    Precautions/Limitations fall precautions   Weight-Bearing Status - LUE full weight-bearing   Weight-Bearing Status - RUE full weight-bearing   Weight-Bearing Status - LLE full weight-bearing   Weight-Bearing Status - RLE full weight-bearing   Heart Disease Risk Factors Medical history;Lack of physical activity;High blood pressure;Diabetes   General Observations Cold \"all of the time\", Very fatigued lately and sleeping much more than usual   Cognitive Status Examination   Orientation orientation to person, place and time   Pain Assessment   Patient Currently in Pain No   Integumentary/Edema   Integumentary/Edema no deficits were identifed   Posture    Posture Forward head position;Protracted shoulders   Range of Motion (ROM)   ROM Comment UEs limited overhead by age and strength-related deficits, mild. LEs limited grossly by acute weakness actively, but no gross joint mobility deficits.    Strength   Strength Comments Generalized weakness throughout body LEs > UEs; Quads, DFs, PFs 2/5, bilaterally   Bed Mobility   Bed Mobility Comments Unable to " assess, as pt was in chair throughout session and declined to return to bed   Transfer Skills   Transfer Comments Sit <> stand with Total assist, 2-people to perform stand pivot transfer from chair <> bed   Gait   Gait Comments Not appropriate for ambulation yet, as cannot bear weight independently   Balance   Balance Comments Fair sitting balance, able to generate weight shifts in multiple directions. Standing balance poor, LEs unable to bear weight, unable to truly assess   Sensory Examination   Sensory Perception no deficits were identified   Coordination   Coordination no deficits were identified   Muscle Tone   Muscle Tone no deficits were identified   Modality Interventions   Planned Modality Interventions Comments prn   General Therapy Interventions   Planned Therapy Interventions balance training;bed mobility training;gait training;ROM;strengthening;transfer training;risk factor education;home program guidelines;progressive activity/exercise   Clinical Impression   Criteria for Skilled Therapeutic Intervention yes, treatment indicated   PT Diagnosis Generalized weakness, Gait impairment, Balance impairment   Influenced by the following impairments Decreased strength, AROM, balance   Functional limitations due to impairments Impaired transfers, bed mobility, ambulation   Clinical Presentation Evolving/Changing   Clinical Presentation Rationale Several episodes of this in the past, History of relatively frequent falls at home, Progressive nature of underlying co-morbidities, well below baseline currently   Clinical Decision Making (Complexity) Moderate complexity   Therapy Frequency` daily   Predicted Duration of Therapy Intervention (days/wks) 1-2 days   Anticipated Discharge Disposition Transitional Care Facility   Risk & Benefits of therapy have been explained Yes   Patient, Family & other staff in agreement with plan of care Yes   Clinical Impression Comments Shaun is a pleasant 89 y/o male admitted to the  "hospital under Obs status for weakness below baseline with the new inability to walk due to gross bilateral LE weakness. He will benefit from skilled PT services to safely progress activity tolerance and recommend safe discharge disposition as indicated, with ultimate goal of facilitating safe return back home.    [facility] TM \"6 Clicks\"   2016, Trustees of [facility], under license to [employer].  All rights reserved.   6 Clicks Short Forms Basic Mobility Inpatient Short Form   [facility]  \"6 Clicks\" V.2 Basic Mobility Inpatient Short Form   1. Turning from your back to your side while in a flat bed without using bedrails? 2 - A Lot   2. Moving from lying on your back to sitting on the side of a flat bed without using bedrails? 2 - A Lot   3. Moving to and from a bed to a chair (including a wheelchair)? 1 - Total   4. Standing up from a chair using your arms (e.g., wheelchair, or bedside chair)? 2 - A Lot   5. To walk in hospital room? 1 - Total   6. Climbing 3-5 steps with a railing? 1 - Total   Basic Mobility Raw Score (Score out of 24.Lower scores equate to lower levels of function) 9   Total Evaluation Time   Total Evaluation Time (Minutes) 35         Yariel Woodward, PT, DPT        9/22/2017      1:32 PM  [facility]  [phone]          "

## 2017-09-22 NOTE — ED NOTES
Bed: ED02  Expected date: 9/21/17  Expected time: 7:30 PM  Means of arrival: Ambulance  Comments:  HOLD for EMS

## 2017-09-22 NOTE — PROGRESS NOTES
PRIMARY DIAGNOSIS: GENERALIZED WEAKNESS      OUTPATIENT/OBSERVATION GOALS TO BE MET BEFORE DISCHARGE   Observation goals PRIOR TO DISCHARGE     Comments: Strength improving, PT eval completed and plan in place for safe disposition   Nurse to notify provider when observation goals have been met and patient is ready for discharge.          1. Orthostatic performed: N/A    2. Tolerating PO medications: Yes    3. Return to near baseline physical activity: No, patient weak to stand and is unsteady. Normally uses walker at home and motorized chair for longer distances    4. Cleared for discharge by consultants (if involved): N/A    Discharge Planner Nurse   Safe discharge environment identified: No  Barriers to discharge: Yes, physical therapy to see to determine safety upon discharge       Entered by: Katarina Denise 09/22/2017 5:59 AM     Please review provider order for any additional goals.   Nurse to notify provider when observation goals have been met and patient is ready for discharge.

## 2017-10-17 NOTE — MR AVS SNAPSHOT
After Visit Summary   10/17/2017    Shaun Gotti    MRN: [id number]           Patient Information     Date Of Birth          5/23/1925        Visit Information        Provider Department      10/17/2017 11:45 AM FOWLER TECH1 [facility] HEART AT [place]        Today's Diagnoses     Cardiac pacemaker in situ    -  1       Follow-ups after your visit        Additional Services     Follow-Up with Device Clinic                 Future tests that were ordered for you today     Open Future Orders        Priority Expected Expires Ordered    Follow-Up with Device Clinic Routine 1/18/2018 10/18/2018 10/18/2017            Who to contact     If you have questions or need follow up information about today's clinic visit or your schedule please contact [facility] directly at [phone].  Normal or non-critical lab and imaging results will be communicated to you by MyChart, letter or phone within 4 business days after the clinic has received the results. If you do not hear from us within 7 days, please contact the clinic through MyChart or phone. If you have a critical or abnormal lab result, we will notify you by phone as soon as possible.  Submit refill requests through [employer] or call your pharmacy and they will forward the refill request to us. Please allow 3 business days for your refill to be completed.          Additional Information About Your Visit        Care EveryWhere ID     This is your Care EveryWhere ID. This could be used by other organizations to access your [place] medical records  [id number]         Blood Pressure from Last 3 Encounters:   09/22/17 177/72   02/22/17 157/77   08/17/16 146/60    Weight from Last 3 Encounters:   09/21/17 70.7 kg (155 lb 12.8 oz)   02/22/17 79.4 kg (175 lb)   08/17/16 78.5 kg (173 lb 1 oz)              We Performed the Following     INTERROGATION DEVICE EVAL REMOTE, PACER/ICD (56635)     PM DEVICE  INTERROGATE REMOTE (69097)        Primary Care Provider Office Phone # Fax #    Sony Berkowitz [phone]       [facility] [address] [place] [address]        Equal Access to Services     JOHN RAGSDALE : Hadii aad ku hadasho Soomaali, waaxda luqadaha, qaybta kaalmada adeegyada, waxay idiin hayaan adeeg serene lajakoblidia cerda. So [facility] [phone].    ATENCIÓN: Si habla español, tiene a castano disposición servicios gratuitos de asistencia lingüística. Llame al [phone].    We comply with applicable federal civil rights laws and [place] laws. We do not discriminate on the basis of race, color, national origin, age, disability, sex, sexual orientation, or gender identity.            Thank you!     Thank you for choosing [facility] HEART AT [place]  for your care. Our goal is always to provide you with excellent care. Hearing back from our patients is one way we can continue to improve our services. Please take a few minutes to complete the written survey that you may receive in the mail after your visit with us. Thank you!             Your Updated Medication List - Protect others around you: Learn how to safely use, store and throw away your medicines at www.disposemymeds.org.          This list is accurate as of: 10/17/17 11:59 PM.  Always use your most recent med list.                   Brand Name Dispense Instructions for use Diagnosis    acetaminophen 500 MG tablet    TYLENOL     1 TABLET THREE TIMES A DAY. DO NOT EXCEED 4000MG OF ACETAMINOPHEN IN A 24 HOUR PERIOD.        amLODIPine 10 MG tablet    NORVASC     1 TABLET DAILY        aspirin 81 MG tablet      1 TABLET DAILY        ATORVASTATIN CALCIUM PO      Take 20 mg by mouth daily        clopidogrel 75 MG tablet    PLAVIX     1 TABLET DAILY        finasteride 5 MG tablet    PROSCAR    30    1 TABLET DAILY        hypromellose 0.3 % Soln ophthalmic solution    GENTEAL     1 drop 4 times daily         LANTUS VIAL 100 UNIT/ML injection   Generic drug:  insulin glargine      Inject 20 Units Subcutaneous every morning        METOPROLOL SUCCINATE PO      Take 50 mg by mouth daily.        nitroGLYcerin 0.4 MG sublingual tablet    NITROSTAT     1 TAB EVERY 5 MIN AS NEEDED FOR CHEST PAIN, UP TO 3 PER EPISODE        omeprazole 20 MG CR capsule    priLOSEC     1 CAPSULE DAILY        * order for DME     1 Device    Equipment being ordered: 4 wheel walker with brakes and seat    Low back pain, Fall, initial encounter       * order for DME     1 Device    Equipment being ordered: walker    Sepsis(995.91), Falls frequently       * order for DME     1 Device    Equipment being ordered: transfer bridge    Sepsis(995.91), Falls frequently       PRESERVISION AREDS 2 PO      Take 2 tablets by mouth daily One in AM; one in PM        RENAL MULTIVITAMIN FORMULA PO      Take 1 capsule by mouth every morning        sertraline 25 MG tablet    ZOLOFT    30 tablet    Take 1 tablet (25 mg) by mouth daily        TAMSULOSIN HCL CAPS 0.4 MG OR      1 CAPSULE DAILY        VITAMIN D (CHOLECALCIFEROL) PO      Take 1,000 Units by mouth 2 times daily. Take 2 tablets twice daily for 30 days, then take 1 tablet twice daily.        * Notice:  This list has 3 medication(s) that are the same as other medications prescribed for you. Read the directions carefully, and ask your doctor or other care provider to review them with you.

## 2018-01-01 ENCOUNTER — ANTICOAGULATION THERAPY VISIT (OUTPATIENT)
Dept: ANTICOAGULATION | Facility: CLINIC | Age: 83
End: 2018-01-01
Payer: COMMERCIAL

## 2018-01-01 ENCOUNTER — TELEPHONE (OUTPATIENT)
Dept: INTERNAL MEDICINE | Facility: CLINIC | Age: 83
End: 2018-01-01

## 2018-01-01 ENCOUNTER — APPOINTMENT (OUTPATIENT)
Dept: PHYSICAL THERAPY | Facility: CLINIC | Age: 83
DRG: 392 | End: 2018-01-01
Attending: INTERNAL MEDICINE
Payer: MEDICARE

## 2018-01-01 ENCOUNTER — APPOINTMENT (OUTPATIENT)
Dept: GENERAL RADIOLOGY | Facility: CLINIC | Age: 83
DRG: 175 | End: 2018-01-01
Attending: EMERGENCY MEDICINE
Payer: MEDICARE

## 2018-01-01 ENCOUNTER — ANTICOAGULATION THERAPY VISIT (OUTPATIENT)
Dept: ANTICOAGULATION | Facility: CLINIC | Age: 83
End: 2018-01-01

## 2018-01-01 ENCOUNTER — CARE COORDINATION (OUTPATIENT)
Dept: INTERNAL MEDICINE | Facility: CLINIC | Age: 83
End: 2018-01-01

## 2018-01-01 ENCOUNTER — OFFICE VISIT (OUTPATIENT)
Dept: INTERNAL MEDICINE | Facility: CLINIC | Age: 83
End: 2018-01-01
Payer: COMMERCIAL

## 2018-01-01 ENCOUNTER — HOSPITAL ENCOUNTER (INPATIENT)
Facility: CLINIC | Age: 83
LOS: 3 days | Discharge: HOME HEALTH | DRG: 175 | End: 2018-02-26
Attending: EMERGENCY MEDICINE | Admitting: PEDIATRICS
Payer: MEDICARE

## 2018-01-01 ENCOUNTER — APPOINTMENT (OUTPATIENT)
Dept: OCCUPATIONAL THERAPY | Facility: CLINIC | Age: 83
DRG: 392 | End: 2018-01-01
Attending: FAMILY MEDICINE
Payer: MEDICARE

## 2018-01-01 ENCOUNTER — APPOINTMENT (OUTPATIENT)
Dept: ULTRASOUND IMAGING | Facility: CLINIC | Age: 83
DRG: 175 | End: 2018-01-01
Attending: PEDIATRICS
Payer: MEDICARE

## 2018-01-01 ENCOUNTER — APPOINTMENT (OUTPATIENT)
Dept: CT IMAGING | Facility: CLINIC | Age: 83
DRG: 392 | End: 2018-01-01
Attending: EMERGENCY MEDICINE
Payer: MEDICARE

## 2018-01-01 ENCOUNTER — APPOINTMENT (OUTPATIENT)
Dept: SPEECH THERAPY | Facility: CLINIC | Age: 83
DRG: 392 | End: 2018-01-01
Attending: INTERNAL MEDICINE
Payer: MEDICARE

## 2018-01-01 ENCOUNTER — APPOINTMENT (OUTPATIENT)
Dept: PHYSICAL THERAPY | Facility: CLINIC | Age: 83
DRG: 392 | End: 2018-01-01
Payer: MEDICARE

## 2018-01-01 ENCOUNTER — APPOINTMENT (OUTPATIENT)
Dept: PHYSICAL THERAPY | Facility: CLINIC | Age: 83
DRG: 175 | End: 2018-01-01
Attending: INTERNAL MEDICINE
Payer: MEDICARE

## 2018-01-01 ENCOUNTER — APPOINTMENT (OUTPATIENT)
Dept: PHYSICAL THERAPY | Facility: CLINIC | Age: 83
DRG: 175 | End: 2018-01-01
Payer: MEDICARE

## 2018-01-01 ENCOUNTER — TELEPHONE (OUTPATIENT)
Dept: FAMILY MEDICINE | Facility: CLINIC | Age: 83
End: 2018-01-01

## 2018-01-01 ENCOUNTER — APPOINTMENT (OUTPATIENT)
Dept: LAB | Facility: CLINIC | Age: 83
End: 2018-01-01
Payer: COMMERCIAL

## 2018-01-01 ENCOUNTER — TELEPHONE (OUTPATIENT)
Dept: ANTICOAGULATION | Facility: OTHER | Age: 83
End: 2018-01-01

## 2018-01-01 ENCOUNTER — APPOINTMENT (OUTPATIENT)
Dept: NUCLEAR MEDICINE | Facility: CLINIC | Age: 83
DRG: 175 | End: 2018-01-01
Attending: INTERNAL MEDICINE
Payer: MEDICARE

## 2018-01-01 ENCOUNTER — APPOINTMENT (OUTPATIENT)
Dept: GENERAL RADIOLOGY | Facility: CLINIC | Age: 83
DRG: 392 | End: 2018-01-01
Attending: EMERGENCY MEDICINE
Payer: MEDICARE

## 2018-01-01 ENCOUNTER — CARE COORDINATION (OUTPATIENT)
Dept: CARE COORDINATION | Facility: CLINIC | Age: 83
End: 2018-01-01

## 2018-01-01 ENCOUNTER — HOSPITAL ENCOUNTER (INPATIENT)
Facility: CLINIC | Age: 83
LOS: 1 days | Discharge: ANOTHER HOSPITAL | DRG: 392 | End: 2018-01-12
Attending: EMERGENCY MEDICINE | Admitting: INTERNAL MEDICINE
Payer: MEDICARE

## 2018-01-01 ENCOUNTER — ANTICOAGULATION THERAPY VISIT (OUTPATIENT)
Dept: ANTICOAGULATION | Facility: OTHER | Age: 83
End: 2018-01-01
Payer: COMMERCIAL

## 2018-01-01 ENCOUNTER — APPOINTMENT (OUTPATIENT)
Dept: CARDIOLOGY | Facility: CLINIC | Age: 83
DRG: 392 | End: 2018-01-01
Attending: EMERGENCY MEDICINE
Payer: MEDICARE

## 2018-01-01 VITALS
RESPIRATION RATE: 20 BRPM | HEART RATE: 82 BPM | OXYGEN SATURATION: 95 % | SYSTOLIC BLOOD PRESSURE: 98 MMHG | DIASTOLIC BLOOD PRESSURE: 56 MMHG

## 2018-01-01 VITALS
BODY MASS INDEX: 22.47 KG/M2 | HEART RATE: 80 BPM | WEIGHT: 156.97 LBS | TEMPERATURE: 98.4 F | OXYGEN SATURATION: 94 % | DIASTOLIC BLOOD PRESSURE: 70 MMHG | RESPIRATION RATE: 18 BRPM | HEIGHT: 70 IN | SYSTOLIC BLOOD PRESSURE: 146 MMHG

## 2018-01-01 VITALS
HEART RATE: 77 BPM | SYSTOLIC BLOOD PRESSURE: 139 MMHG | OXYGEN SATURATION: 94 % | RESPIRATION RATE: 18 BRPM | WEIGHT: 154.32 LBS | TEMPERATURE: 98 F | DIASTOLIC BLOOD PRESSURE: 65 MMHG | BODY MASS INDEX: 22.14 KG/M2

## 2018-01-01 DIAGNOSIS — N18.4 ANEMIA IN STAGE 4 CHRONIC KIDNEY DISEASE (H): ICD-10-CM

## 2018-01-01 DIAGNOSIS — I26.99 ACUTE PULMONARY EMBOLISM (H): ICD-10-CM

## 2018-01-01 DIAGNOSIS — Z79.01 LONG-TERM (CURRENT) USE OF ANTICOAGULANTS: ICD-10-CM

## 2018-01-01 DIAGNOSIS — D64.9 ANEMIA, UNSPECIFIED TYPE: ICD-10-CM

## 2018-01-01 DIAGNOSIS — N18.4 TYPE 2 DIABETES MELLITUS WITH STAGE 4 CHRONIC KIDNEY DISEASE, WITH LONG-TERM CURRENT USE OF INSULIN (H): ICD-10-CM

## 2018-01-01 DIAGNOSIS — I27.20 PULMONARY HYPERTENSION (H): ICD-10-CM

## 2018-01-01 DIAGNOSIS — E11.22 TYPE 2 DIABETES MELLITUS WITH STAGE 4 CHRONIC KIDNEY DISEASE, WITH LONG-TERM CURRENT USE OF INSULIN (H): ICD-10-CM

## 2018-01-01 DIAGNOSIS — M62.81 GENERALIZED MUSCLE WEAKNESS: ICD-10-CM

## 2018-01-01 DIAGNOSIS — K21.9 GASTROESOPHAGEAL REFLUX DISEASE WITHOUT ESOPHAGITIS: ICD-10-CM

## 2018-01-01 DIAGNOSIS — I26.99 OTHER ACUTE PULMONARY EMBOLISM WITHOUT ACUTE COR PULMONALE (H): ICD-10-CM

## 2018-01-01 DIAGNOSIS — N40.0 BENIGN PROSTATIC HYPERPLASIA, UNSPECIFIED WHETHER LOWER URINARY TRACT SYMPTOMS PRESENT: ICD-10-CM

## 2018-01-01 DIAGNOSIS — R09.02 HYPOXIA: ICD-10-CM

## 2018-01-01 DIAGNOSIS — R79.89 ELEVATED BRAIN NATRIURETIC PEPTIDE (BNP) LEVEL: ICD-10-CM

## 2018-01-01 DIAGNOSIS — D63.1 ANEMIA IN STAGE 4 CHRONIC KIDNEY DISEASE (H): ICD-10-CM

## 2018-01-01 DIAGNOSIS — N18.4 TYPE 2 DIABETES MELLITUS WITH STAGE 4 CHRONIC KIDNEY DISEASE, WITHOUT LONG-TERM CURRENT USE OF INSULIN (H): ICD-10-CM

## 2018-01-01 DIAGNOSIS — N18.30 TYPE 2 DIABETES MELLITUS WITH STAGE 3 CHRONIC KIDNEY DISEASE, WITHOUT LONG-TERM CURRENT USE OF INSULIN (H): ICD-10-CM

## 2018-01-01 DIAGNOSIS — Z79.4 TYPE 2 DIABETES MELLITUS WITH STAGE 4 CHRONIC KIDNEY DISEASE, WITH LONG-TERM CURRENT USE OF INSULIN (H): ICD-10-CM

## 2018-01-01 DIAGNOSIS — Z79.01 LONG TERM CURRENT USE OF ANTICOAGULANT THERAPY: ICD-10-CM

## 2018-01-01 DIAGNOSIS — R53.1 WEAKNESS: ICD-10-CM

## 2018-01-01 DIAGNOSIS — N17.9 ACUTE KIDNEY INJURY (H): ICD-10-CM

## 2018-01-01 DIAGNOSIS — R41.0 CONFUSION: ICD-10-CM

## 2018-01-01 DIAGNOSIS — I26.99 OTHER ACUTE PULMONARY EMBOLISM WITHOUT ACUTE COR PULMONALE (H): Primary | ICD-10-CM

## 2018-01-01 DIAGNOSIS — E11.22 TYPE 2 DIABETES MELLITUS WITH STAGE 3 CHRONIC KIDNEY DISEASE, WITHOUT LONG-TERM CURRENT USE OF INSULIN (H): ICD-10-CM

## 2018-01-01 DIAGNOSIS — E11.22 TYPE 2 DIABETES MELLITUS WITH STAGE 4 CHRONIC KIDNEY DISEASE, WITHOUT LONG-TERM CURRENT USE OF INSULIN (H): ICD-10-CM

## 2018-01-01 DIAGNOSIS — F32.5 MAJOR DEPRESSION IN COMPLETE REMISSION (H): ICD-10-CM

## 2018-01-01 DIAGNOSIS — R52 PAIN: Primary | ICD-10-CM

## 2018-01-01 DIAGNOSIS — N18.4 CKD (CHRONIC KIDNEY DISEASE) STAGE 4, GFR 15-29 ML/MIN (H): ICD-10-CM

## 2018-01-01 DIAGNOSIS — I25.10 ATHEROSCLEROSIS OF CORONARY ARTERY OF NATIVE HEART, ANGINA PRESENCE UNSPECIFIED, UNSPECIFIED VESSEL OR LESION TYPE: ICD-10-CM

## 2018-01-01 DIAGNOSIS — I10 ESSENTIAL HYPERTENSION, BENIGN: ICD-10-CM

## 2018-01-01 LAB
ABO + RH BLD: NORMAL
ABO + RH BLD: NORMAL
ALBUMIN SERPL-MCNC: 2.5 G/DL (ref 3.4–5)
ALBUMIN SERPL-MCNC: 3.3 G/DL (ref 3.4–5)
ALBUMIN UR-MCNC: 100 MG/DL
ALBUMIN UR-MCNC: 100 MG/DL
ALP SERPL-CCNC: 154 U/L (ref 40–150)
ALP SERPL-CCNC: 95 U/L (ref 40–150)
ALT SERPL W P-5'-P-CCNC: 22 U/L (ref 0–70)
ALT SERPL W P-5'-P-CCNC: 29 U/L (ref 0–70)
AMORPH CRY #/AREA URNS HPF: ABNORMAL /HPF
ANION GAP SERPL CALCULATED.3IONS-SCNC: 10 MMOL/L (ref 3–14)
ANION GAP SERPL CALCULATED.3IONS-SCNC: 11 MMOL/L (ref 3–14)
ANION GAP SERPL CALCULATED.3IONS-SCNC: 12 MMOL/L (ref 3–14)
ANION GAP SERPL CALCULATED.3IONS-SCNC: 12 MMOL/L (ref 3–14)
ANION GAP SERPL CALCULATED.3IONS-SCNC: 14 MMOL/L (ref 3–14)
ANION GAP SERPL CALCULATED.3IONS-SCNC: 16 MMOL/L (ref 3–14)
ANION GAP SERPL CALCULATED.3IONS-SCNC: 7 MMOL/L (ref 3–14)
APPEARANCE UR: ABNORMAL
APPEARANCE UR: CLEAR
AST SERPL W P-5'-P-CCNC: 21 U/L (ref 0–45)
AST SERPL W P-5'-P-CCNC: 49 U/L (ref 0–45)
BACTERIA #/AREA URNS HPF: ABNORMAL /HPF
BACTERIA SPEC CULT: ABNORMAL
BACTERIA SPEC CULT: NORMAL
BACTERIA SPEC CULT: NORMAL
BASE DEFICIT BLDA-SCNC: 4.1 MMOL/L
BASE DEFICIT BLDV-SCNC: 2.4 MMOL/L
BASE EXCESS BLDV CALC-SCNC: 0.3 MMOL/L
BASOPHILS # BLD AUTO: 0 10E9/L (ref 0–0.2)
BASOPHILS # BLD AUTO: 0 10E9/L (ref 0–0.2)
BASOPHILS NFR BLD AUTO: 0.2 %
BASOPHILS NFR BLD AUTO: 0.4 %
BILIRUB SERPL-MCNC: 0.5 MG/DL (ref 0.2–1.3)
BILIRUB SERPL-MCNC: 0.8 MG/DL (ref 0.2–1.3)
BILIRUB UR QL STRIP: NEGATIVE
BILIRUB UR QL STRIP: NEGATIVE
BLD GP AB SCN SERPL QL: NORMAL
BLOOD BANK CMNT PATIENT-IMP: NORMAL
BUN SERPL-MCNC: 39 MG/DL (ref 7–30)
BUN SERPL-MCNC: 40 MG/DL (ref 7–30)
BUN SERPL-MCNC: 40 MG/DL (ref 7–30)
BUN SERPL-MCNC: 42 MG/DL (ref 7–30)
BUN SERPL-MCNC: 42 MG/DL (ref 7–30)
BUN SERPL-MCNC: 43 MG/DL (ref 7–30)
BUN SERPL-MCNC: 48 MG/DL (ref 7–30)
BUN SERPL-MCNC: 49 MG/DL (ref 7–30)
BUN SERPL-MCNC: 49 MG/DL (ref 7–30)
CALCIUM SERPL-MCNC: 8.2 MG/DL (ref 8.5–10.1)
CALCIUM SERPL-MCNC: 8.3 MG/DL (ref 8.5–10.1)
CALCIUM SERPL-MCNC: 8.4 MG/DL (ref 8.5–10.1)
CALCIUM SERPL-MCNC: 8.4 MG/DL (ref 8.5–10.1)
CALCIUM SERPL-MCNC: 8.5 MG/DL (ref 8.5–10.1)
CALCIUM SERPL-MCNC: 8.9 MG/DL (ref 8.5–10.1)
CALCIUM SERPL-MCNC: 9.1 MG/DL (ref 8.5–10.1)
CHLORIDE SERPL-SCNC: 104 MMOL/L (ref 94–109)
CHLORIDE SERPL-SCNC: 108 MMOL/L (ref 94–109)
CHLORIDE SERPL-SCNC: 109 MMOL/L (ref 94–109)
CHLORIDE SERPL-SCNC: 113 MMOL/L (ref 94–109)
CO2 SERPL-SCNC: 18 MMOL/L (ref 20–32)
CO2 SERPL-SCNC: 19 MMOL/L (ref 20–32)
CO2 SERPL-SCNC: 20 MMOL/L (ref 20–32)
CO2 SERPL-SCNC: 20 MMOL/L (ref 20–32)
CO2 SERPL-SCNC: 21 MMOL/L (ref 20–32)
CO2 SERPL-SCNC: 21 MMOL/L (ref 20–32)
CO2 SERPL-SCNC: 27 MMOL/L (ref 20–32)
COLOR UR AUTO: YELLOW
COLOR UR AUTO: YELLOW
CREAT SERPL-MCNC: 2.46 MG/DL (ref 0.66–1.25)
CREAT SERPL-MCNC: 2.5 MG/DL (ref 0.66–1.25)
CREAT SERPL-MCNC: 2.55 MG/DL (ref 0.66–1.25)
CREAT SERPL-MCNC: 3.4 MG/DL (ref 0.66–1.25)
CREAT SERPL-MCNC: 3.48 MG/DL (ref 0.66–1.25)
CREAT SERPL-MCNC: 3.87 MG/DL (ref 0.66–1.25)
CREAT SERPL-MCNC: 3.92 MG/DL (ref 0.66–1.25)
CREAT SERPL-MCNC: 4.01 MG/DL (ref 0.66–1.25)
CREAT SERPL-MCNC: 4.22 MG/DL (ref 0.66–1.25)
CREAT UR-MCNC: 132 MG/DL
D DIMER PPP FEU-MCNC: 2.9 UG/ML FEU (ref 0–0.5)
D DIMER PPP FEU-MCNC: >20 UG/ML FEU (ref 0–0.5)
DIFFERENTIAL METHOD BLD: ABNORMAL
DIFFERENTIAL METHOD BLD: ABNORMAL
EOSINOPHIL # BLD AUTO: 0.1 10E9/L (ref 0–0.7)
EOSINOPHIL # BLD AUTO: 0.2 10E9/L (ref 0–0.7)
EOSINOPHIL NFR BLD AUTO: 1.5 %
EOSINOPHIL NFR BLD AUTO: 3.6 %
ERYTHROCYTE [DISTWIDTH] IN BLOOD BY AUTOMATED COUNT: 13.4 % (ref 10–15)
ERYTHROCYTE [DISTWIDTH] IN BLOOD BY AUTOMATED COUNT: 13.6 % (ref 10–15)
ERYTHROCYTE [DISTWIDTH] IN BLOOD BY AUTOMATED COUNT: 14.3 % (ref 10–15)
ERYTHROCYTE [DISTWIDTH] IN BLOOD BY AUTOMATED COUNT: 14.8 % (ref 10–15)
ERYTHROCYTE [DISTWIDTH] IN BLOOD BY AUTOMATED COUNT: 15.5 % (ref 10–15)
FERRITIN SERPL-MCNC: 480 NG/ML (ref 26–388)
FRACT EXCRET NA UR+SERPL-RTO: 1.1 %
GFR SERPL CREATININE-BSD FRML MDRD: 13 ML/MIN/1.7M2
GFR SERPL CREATININE-BSD FRML MDRD: 14 ML/MIN/1.7M2
GFR SERPL CREATININE-BSD FRML MDRD: 14 ML/MIN/1.7M2
GFR SERPL CREATININE-BSD FRML MDRD: 15 ML/MIN/1.7M2
GFR SERPL CREATININE-BSD FRML MDRD: 17 ML/MIN/1.7M2
GFR SERPL CREATININE-BSD FRML MDRD: 17 ML/MIN/1.7M2
GFR SERPL CREATININE-BSD FRML MDRD: 24 ML/MIN/1.7M2
GFR SERPL CREATININE-BSD FRML MDRD: 24 ML/MIN/1.7M2
GFR SERPL CREATININE-BSD FRML MDRD: 25 ML/MIN/1.7M2
GLUCOSE BLDC GLUCOMTR-MCNC: 102 MG/DL (ref 70–99)
GLUCOSE BLDC GLUCOMTR-MCNC: 110 MG/DL (ref 70–99)
GLUCOSE BLDC GLUCOMTR-MCNC: 117 MG/DL (ref 70–99)
GLUCOSE BLDC GLUCOMTR-MCNC: 118 MG/DL (ref 70–99)
GLUCOSE BLDC GLUCOMTR-MCNC: 127 MG/DL (ref 70–99)
GLUCOSE BLDC GLUCOMTR-MCNC: 128 MG/DL (ref 70–99)
GLUCOSE BLDC GLUCOMTR-MCNC: 131 MG/DL (ref 70–99)
GLUCOSE BLDC GLUCOMTR-MCNC: 131 MG/DL (ref 70–99)
GLUCOSE BLDC GLUCOMTR-MCNC: 135 MG/DL (ref 70–99)
GLUCOSE BLDC GLUCOMTR-MCNC: 135 MG/DL (ref 70–99)
GLUCOSE BLDC GLUCOMTR-MCNC: 142 MG/DL (ref 70–99)
GLUCOSE BLDC GLUCOMTR-MCNC: 143 MG/DL (ref 70–99)
GLUCOSE BLDC GLUCOMTR-MCNC: 146 MG/DL (ref 70–99)
GLUCOSE BLDC GLUCOMTR-MCNC: 148 MG/DL (ref 70–99)
GLUCOSE BLDC GLUCOMTR-MCNC: 152 MG/DL (ref 70–99)
GLUCOSE BLDC GLUCOMTR-MCNC: 159 MG/DL (ref 70–99)
GLUCOSE BLDC GLUCOMTR-MCNC: 162 MG/DL (ref 70–99)
GLUCOSE BLDC GLUCOMTR-MCNC: 166 MG/DL (ref 70–99)
GLUCOSE BLDC GLUCOMTR-MCNC: 174 MG/DL (ref 70–99)
GLUCOSE BLDC GLUCOMTR-MCNC: 174 MG/DL (ref 70–99)
GLUCOSE BLDC GLUCOMTR-MCNC: 179 MG/DL (ref 70–99)
GLUCOSE BLDC GLUCOMTR-MCNC: 81 MG/DL (ref 70–99)
GLUCOSE SERPL-MCNC: 100 MG/DL (ref 70–99)
GLUCOSE SERPL-MCNC: 100 MG/DL (ref 70–99)
GLUCOSE SERPL-MCNC: 122 MG/DL (ref 70–99)
GLUCOSE SERPL-MCNC: 133 MG/DL (ref 70–99)
GLUCOSE SERPL-MCNC: 157 MG/DL (ref 70–99)
GLUCOSE SERPL-MCNC: 165 MG/DL (ref 70–99)
GLUCOSE SERPL-MCNC: 175 MG/DL (ref 70–99)
GLUCOSE SERPL-MCNC: 177 MG/DL (ref 70–99)
GLUCOSE SERPL-MCNC: 221 MG/DL (ref 70–99)
GLUCOSE UR STRIP-MCNC: 50 MG/DL
GLUCOSE UR STRIP-MCNC: NEGATIVE MG/DL
HBA1C MFR BLD: 7.5 % (ref 4.3–6)
HCO3 BLD-SCNC: 19 MMOL/L (ref 21–28)
HCO3 BLDV-SCNC: 23 MMOL/L (ref 21–28)
HCO3 BLDV-SCNC: 25 MMOL/L (ref 21–28)
HCT VFR BLD AUTO: 27.9 % (ref 40–53)
HCT VFR BLD AUTO: 28.7 % (ref 40–53)
HCT VFR BLD AUTO: 30.8 % (ref 40–53)
HCT VFR BLD AUTO: 34.7 % (ref 40–53)
HCT VFR BLD AUTO: 36.6 % (ref 40–53)
HGB BLD-MCNC: 11 G/DL (ref 13.3–17.7)
HGB BLD-MCNC: 11.6 G/DL (ref 13.3–17.7)
HGB BLD-MCNC: 8.3 G/DL (ref 13.3–17.7)
HGB BLD-MCNC: 8.4 G/DL (ref 13.3–17.7)
HGB BLD-MCNC: 8.4 G/DL (ref 13.3–17.7)
HGB BLD-MCNC: 8.7 G/DL (ref 13.3–17.7)
HGB BLD-MCNC: 8.7 G/DL (ref 13.3–17.7)
HGB BLD-MCNC: 9 G/DL (ref 13.3–17.7)
HGB UR QL STRIP: ABNORMAL
HGB UR QL STRIP: NEGATIVE
HYALINE CASTS #/AREA URNS LPF: 1 /LPF (ref 0–2)
HYALINE CASTS #/AREA URNS LPF: 1 /LPF (ref 0–2)
IMM GRANULOCYTES # BLD: 0 10E9/L (ref 0–0.4)
IMM GRANULOCYTES # BLD: 0 10E9/L (ref 0–0.4)
IMM GRANULOCYTES NFR BLD: 0.2 %
IMM GRANULOCYTES NFR BLD: 0.4 %
INR PPP: 1.12 (ref 0.86–1.14)
INR PPP: 1.23 (ref 0.86–1.14)
INR PPP: 1.6
INR PPP: 1.6
INR PPP: 1.7
INR PPP: 1.9
INR PPP: 1.9
INR PPP: 2
INR PPP: 2.04 (ref 0.86–1.14)
INR PPP: 2.9
INR PPP: 3
INR PPP: 3.4
INR PPP: 4.11 (ref 0.86–1.14)
INR PPP: 4.2
INR PPP: 4.6
INR PPP: 5.9
INR PPP: 6.2 (ref 0.86–1.14)
IRON SATN MFR SERPL: 15 % (ref 15–46)
IRON SERPL-MCNC: 28 UG/DL (ref 35–180)
KETONES UR STRIP-MCNC: 5 MG/DL
KETONES UR STRIP-MCNC: NEGATIVE MG/DL
LACTATE BLD-SCNC: 0.9 MMOL/L (ref 0.7–2)
LACTATE BLD-SCNC: 1.7 MMOL/L (ref 0.7–2)
LACTATE BLD-SCNC: 4.6 MMOL/L (ref 0.7–2)
LEUKOCYTE ESTERASE UR QL STRIP: NEGATIVE
LEUKOCYTE ESTERASE UR QL STRIP: NEGATIVE
LIPASE SERPL-CCNC: 54 U/L (ref 73–393)
LMWH PPP CHRO-ACNC: 0.38 IU/ML
LMWH PPP CHRO-ACNC: 0.41 IU/ML
LMWH PPP CHRO-ACNC: 0.55 IU/ML
LMWH PPP CHRO-ACNC: 0.63 IU/ML
LMWH PPP CHRO-ACNC: 0.83 IU/ML
LYMPHOCYTES # BLD AUTO: 0.6 10E9/L (ref 0.8–5.3)
LYMPHOCYTES # BLD AUTO: 0.8 10E9/L (ref 0.8–5.3)
LYMPHOCYTES NFR BLD AUTO: 15.4 %
LYMPHOCYTES NFR BLD AUTO: 8.6 %
Lab: NORMAL
MAGNESIUM SERPL-MCNC: 2.1 MG/DL (ref 1.6–2.3)
MCH RBC QN AUTO: 28.4 PG (ref 26.5–33)
MCH RBC QN AUTO: 28.7 PG (ref 26.5–33)
MCH RBC QN AUTO: 28.9 PG (ref 26.5–33)
MCH RBC QN AUTO: 30.1 PG (ref 26.5–33)
MCH RBC QN AUTO: 30.2 PG (ref 26.5–33)
MCHC RBC AUTO-ENTMCNC: 29.2 G/DL (ref 31.5–36.5)
MCHC RBC AUTO-ENTMCNC: 30.1 G/DL (ref 31.5–36.5)
MCHC RBC AUTO-ENTMCNC: 30.3 G/DL (ref 31.5–36.5)
MCHC RBC AUTO-ENTMCNC: 31.7 G/DL (ref 31.5–36.5)
MCHC RBC AUTO-ENTMCNC: 31.7 G/DL (ref 31.5–36.5)
MCV RBC AUTO: 95 FL (ref 78–100)
MCV RBC AUTO: 97 FL (ref 78–100)
MONOCYTES # BLD AUTO: 0.5 10E9/L (ref 0–1.3)
MONOCYTES # BLD AUTO: 0.8 10E9/L (ref 0–1.3)
MONOCYTES NFR BLD AUTO: 11.4 %
MONOCYTES NFR BLD AUTO: 9.6 %
MUCOUS THREADS #/AREA URNS LPF: PRESENT /LPF
NEUTROPHILS # BLD AUTO: 3.8 10E9/L (ref 1.6–8.3)
NEUTROPHILS # BLD AUTO: 5.3 10E9/L (ref 1.6–8.3)
NEUTROPHILS NFR BLD AUTO: 71 %
NEUTROPHILS NFR BLD AUTO: 77.7 %
NITRATE UR QL: NEGATIVE
NITRATE UR QL: NEGATIVE
NT-PROBNP SERPL-MCNC: 3711 PG/ML (ref 0–1800)
NT-PROBNP SERPL-MCNC: ABNORMAL PG/ML (ref 0–1800)
O2/TOTAL GAS SETTING VFR VENT: 28 %
O2/TOTAL GAS SETTING VFR VENT: 36 %
O2/TOTAL GAS SETTING VFR VENT: 36 %
PCO2 BLD: 28 MM HG (ref 35–45)
PCO2 BLDV: 38 MM HG (ref 40–50)
PCO2 BLDV: 42 MM HG (ref 40–50)
PH BLD: 7.44 PH (ref 7.35–7.45)
PH BLDV: 7.35 PH (ref 7.32–7.43)
PH BLDV: 7.42 PH (ref 7.32–7.43)
PH UR STRIP: 5 PH (ref 5–7)
PH UR STRIP: 5 PH (ref 5–7)
PHOSPHATE SERPL-MCNC: 2.8 MG/DL (ref 2.5–4.5)
PLATELET # BLD AUTO: 135 10E9/L (ref 150–450)
PLATELET # BLD AUTO: 149 10E9/L (ref 150–450)
PLATELET # BLD AUTO: 156 10E9/L (ref 150–450)
PLATELET # BLD AUTO: 198 10E9/L (ref 150–450)
PLATELET # BLD AUTO: 93 10E9/L (ref 150–450)
PO2 BLD: 101 MM HG (ref 80–105)
PO2 BLDV: 28 MM HG (ref 25–47)
PO2 BLDV: 31 MM HG (ref 25–47)
POTASSIUM SERPL-SCNC: 3.9 MMOL/L (ref 3.4–5.3)
POTASSIUM SERPL-SCNC: 4.1 MMOL/L (ref 3.4–5.3)
POTASSIUM SERPL-SCNC: 4.1 MMOL/L (ref 3.4–5.3)
POTASSIUM SERPL-SCNC: 4.2 MMOL/L (ref 3.4–5.3)
POTASSIUM SERPL-SCNC: 4.4 MMOL/L (ref 3.4–5.3)
POTASSIUM SERPL-SCNC: 4.8 MMOL/L (ref 3.4–5.3)
POTASSIUM SERPL-SCNC: 4.9 MMOL/L (ref 3.4–5.3)
PROT SERPL-MCNC: 7.5 G/DL (ref 6.8–8.8)
PROT SERPL-MCNC: 7.6 G/DL (ref 6.8–8.8)
RADIOLOGIST FLAGS: ABNORMAL
RBC # BLD AUTO: 2.93 10E12/L (ref 4.4–5.9)
RBC # BLD AUTO: 3.01 10E12/L (ref 4.4–5.9)
RBC # BLD AUTO: 3.17 10E12/L (ref 4.4–5.9)
RBC # BLD AUTO: 3.64 10E12/L (ref 4.4–5.9)
RBC # BLD AUTO: 3.86 10E12/L (ref 4.4–5.9)
RBC #/AREA URNS AUTO: 1 /HPF (ref 0–2)
RBC #/AREA URNS AUTO: 4 /HPF (ref 0–2)
RETICS # AUTO: 31.2 10E9/L (ref 25–95)
RETICS/RBC NFR AUTO: 1.1 % (ref 0.5–2)
SODIUM SERPL-SCNC: 138 MMOL/L (ref 133–144)
SODIUM SERPL-SCNC: 139 MMOL/L (ref 133–144)
SODIUM SERPL-SCNC: 140 MMOL/L (ref 133–144)
SODIUM SERPL-SCNC: 142 MMOL/L (ref 133–144)
SODIUM SERPL-SCNC: 142 MMOL/L (ref 133–144)
SODIUM SERPL-SCNC: 146 MMOL/L (ref 133–144)
SODIUM UR-SCNC: 47 MMOL/L
SOURCE: ABNORMAL
SOURCE: ABNORMAL
SP GR UR STRIP: 1.01 (ref 1–1.03)
SP GR UR STRIP: 1.01 (ref 1–1.03)
SPECIMEN EXP DATE BLD: NORMAL
SPECIMEN SOURCE: ABNORMAL
SPECIMEN SOURCE: NORMAL
SPECIMEN SOURCE: NORMAL
SQUAMOUS #/AREA URNS AUTO: 1 /HPF (ref 0–1)
T4 FREE SERPL-MCNC: 1.03 NG/DL (ref 0.76–1.46)
TIBC SERPL-MCNC: 181 UG/DL (ref 240–430)
TROPONIN I SERPL-MCNC: 0.1 UG/L (ref 0–0.04)
TROPONIN I SERPL-MCNC: 0.15 UG/L (ref 0–0.04)
TROPONIN I SERPL-MCNC: 0.16 UG/L (ref 0–0.04)
TROPONIN I SERPL-MCNC: 0.18 UG/L (ref 0–0.04)
TROPONIN I SERPL-MCNC: <0.015 UG/L (ref 0–0.04)
TSH SERPL DL<=0.005 MIU/L-ACNC: 6.14 MU/L (ref 0.4–4)
UROBILINOGEN UR STRIP-MCNC: 0 MG/DL (ref 0–2)
UROBILINOGEN UR STRIP-MCNC: 2 MG/DL (ref 0–2)
WBC # BLD AUTO: 10 10E9/L (ref 4–11)
WBC # BLD AUTO: 5.1 10E9/L (ref 4–11)
WBC # BLD AUTO: 5.3 10E9/L (ref 4–11)
WBC # BLD AUTO: 5.5 10E9/L (ref 4–11)
WBC # BLD AUTO: 6.9 10E9/L (ref 4–11)
WBC #/AREA URNS AUTO: 1 /HPF (ref 0–2)
WBC #/AREA URNS AUTO: <1 /HPF (ref 0–2)

## 2018-01-01 PROCEDURE — 81001 URINALYSIS AUTO W/SCOPE: CPT | Performed by: EMERGENCY MEDICINE

## 2018-01-01 PROCEDURE — 00000146 ZZHCL STATISTIC GLUCOSE BY METER IP

## 2018-01-01 PROCEDURE — A9270 NON-COVERED ITEM OR SERVICE: HCPCS | Mod: GY | Performed by: PEDIATRICS

## 2018-01-01 PROCEDURE — 40000275 ZZH STATISTIC RCP TIME EA 10 MIN

## 2018-01-01 PROCEDURE — 83735 ASSAY OF MAGNESIUM: CPT | Performed by: EMERGENCY MEDICINE

## 2018-01-01 PROCEDURE — 93010 ELECTROCARDIOGRAM REPORT: CPT | Mod: Z6 | Performed by: EMERGENCY MEDICINE

## 2018-01-01 PROCEDURE — 12000000 ZZH R&B MED SURG/OB

## 2018-01-01 PROCEDURE — 99223 1ST HOSP IP/OBS HIGH 75: CPT | Mod: AI | Performed by: PEDIATRICS

## 2018-01-01 PROCEDURE — 82803 BLOOD GASES ANY COMBINATION: CPT | Performed by: INTERNAL MEDICINE

## 2018-01-01 PROCEDURE — 85520 HEPARIN ASSAY: CPT | Performed by: PEDIATRICS

## 2018-01-01 PROCEDURE — 25000132 ZZH RX MED GY IP 250 OP 250 PS 637: Mod: GY | Performed by: INTERNAL MEDICINE

## 2018-01-01 PROCEDURE — 36415 COLL VENOUS BLD VENIPUNCTURE: CPT | Performed by: FAMILY MEDICINE

## 2018-01-01 PROCEDURE — 76770 US EXAM ABDO BACK WALL COMP: CPT

## 2018-01-01 PROCEDURE — 99207 ZZC NO CHARGE NURSE ONLY: CPT | Performed by: INTERNAL MEDICINE

## 2018-01-01 PROCEDURE — 85018 HEMOGLOBIN: CPT | Performed by: INTERNAL MEDICINE

## 2018-01-01 PROCEDURE — 99233 SBSQ HOSP IP/OBS HIGH 50: CPT | Performed by: PEDIATRICS

## 2018-01-01 PROCEDURE — 84484 ASSAY OF TROPONIN QUANT: CPT | Performed by: INTERNAL MEDICINE

## 2018-01-01 PROCEDURE — 25000132 ZZH RX MED GY IP 250 OP 250 PS 637: Mod: GY | Performed by: PEDIATRICS

## 2018-01-01 PROCEDURE — 84443 ASSAY THYROID STIM HORMONE: CPT | Performed by: EMERGENCY MEDICINE

## 2018-01-01 PROCEDURE — 85045 AUTOMATED RETICULOCYTE COUNT: CPT | Performed by: INTERNAL MEDICINE

## 2018-01-01 PROCEDURE — 25000131 ZZH RX MED GY IP 250 OP 636 PS 637: Mod: GY | Performed by: INTERNAL MEDICINE

## 2018-01-01 PROCEDURE — 36415 COLL VENOUS BLD VENIPUNCTURE: CPT | Performed by: PEDIATRICS

## 2018-01-01 PROCEDURE — 25000128 H RX IP 250 OP 636: Performed by: INTERNAL MEDICINE

## 2018-01-01 PROCEDURE — 92610 EVALUATE SWALLOWING FUNCTION: CPT | Mod: GN | Performed by: SPEECH-LANGUAGE PATHOLOGIST

## 2018-01-01 PROCEDURE — 85018 HEMOGLOBIN: CPT | Performed by: PEDIATRICS

## 2018-01-01 PROCEDURE — 25000128 H RX IP 250 OP 636: Performed by: PEDIATRICS

## 2018-01-01 PROCEDURE — 34300033 ZZH RX 343: Performed by: PEDIATRICS

## 2018-01-01 PROCEDURE — 82803 BLOOD GASES ANY COMBINATION: CPT | Performed by: EMERGENCY MEDICINE

## 2018-01-01 PROCEDURE — A9270 NON-COVERED ITEM OR SERVICE: HCPCS | Mod: GY | Performed by: INTERNAL MEDICINE

## 2018-01-01 PROCEDURE — 85025 COMPLETE CBC W/AUTO DIFF WBC: CPT | Performed by: EMERGENCY MEDICINE

## 2018-01-01 PROCEDURE — 82570 ASSAY OF URINE CREATININE: CPT | Performed by: INTERNAL MEDICINE

## 2018-01-01 PROCEDURE — 96361 HYDRATE IV INFUSION ADD-ON: CPT | Performed by: EMERGENCY MEDICINE

## 2018-01-01 PROCEDURE — 85027 COMPLETE CBC AUTOMATED: CPT | Performed by: FAMILY MEDICINE

## 2018-01-01 PROCEDURE — 93005 ELECTROCARDIOGRAM TRACING: CPT

## 2018-01-01 PROCEDURE — 87040 BLOOD CULTURE FOR BACTERIA: CPT | Performed by: EMERGENCY MEDICINE

## 2018-01-01 PROCEDURE — 40000274 ZZH STATISTIC RCP CONSULT EA 30 MIN

## 2018-01-01 PROCEDURE — 40000193 ZZH STATISTIC PT WARD VISIT: Performed by: PHYSICAL THERAPIST

## 2018-01-01 PROCEDURE — 99285 EMERGENCY DEPT VISIT HI MDM: CPT | Mod: 25 | Performed by: EMERGENCY MEDICINE

## 2018-01-01 PROCEDURE — 83880 ASSAY OF NATRIURETIC PEPTIDE: CPT | Performed by: EMERGENCY MEDICINE

## 2018-01-01 PROCEDURE — 97530 THERAPEUTIC ACTIVITIES: CPT | Mod: GO | Performed by: OCCUPATIONAL THERAPIST

## 2018-01-01 PROCEDURE — 25000132 ZZH RX MED GY IP 250 OP 250 PS 637: Mod: GY | Performed by: EMERGENCY MEDICINE

## 2018-01-01 PROCEDURE — A9270 NON-COVERED ITEM OR SERVICE: HCPCS | Mod: GY | Performed by: EMERGENCY MEDICINE

## 2018-01-01 PROCEDURE — 96361 HYDRATE IV INFUSION ADD-ON: CPT

## 2018-01-01 PROCEDURE — 80048 BASIC METABOLIC PNL TOTAL CA: CPT | Performed by: FAMILY MEDICINE

## 2018-01-01 PROCEDURE — A9540 TC99M MAA: HCPCS | Performed by: PEDIATRICS

## 2018-01-01 PROCEDURE — 36415 COLL VENOUS BLD VENIPUNCTURE: CPT | Performed by: INTERNAL MEDICINE

## 2018-01-01 PROCEDURE — 83605 ASSAY OF LACTIC ACID: CPT | Performed by: EMERGENCY MEDICINE

## 2018-01-01 PROCEDURE — 85610 PROTHROMBIN TIME: CPT | Performed by: INTERNAL MEDICINE

## 2018-01-01 PROCEDURE — 25000128 H RX IP 250 OP 636: Performed by: EMERGENCY MEDICINE

## 2018-01-01 PROCEDURE — 99219 ZZC INITIAL OBSERVATION CARE,LEVL II: CPT | Performed by: INTERNAL MEDICINE

## 2018-01-01 PROCEDURE — 99285 EMERGENCY DEPT VISIT HI MDM: CPT | Mod: 25

## 2018-01-01 PROCEDURE — 83540 ASSAY OF IRON: CPT | Performed by: INTERNAL MEDICINE

## 2018-01-01 PROCEDURE — 40000133 ZZH STATISTIC OT WARD VISIT: Performed by: OCCUPATIONAL THERAPIST

## 2018-01-01 PROCEDURE — 99496 TRANSJ CARE MGMT HIGH F2F 7D: CPT | Performed by: INTERNAL MEDICINE

## 2018-01-01 PROCEDURE — 85610 PROTHROMBIN TIME: CPT | Performed by: PEDIATRICS

## 2018-01-01 PROCEDURE — 86901 BLOOD TYPING SEROLOGIC RH(D): CPT | Performed by: INTERNAL MEDICINE

## 2018-01-01 PROCEDURE — 40000225 ZZH STATISTIC SLP WARD VISIT: Performed by: SPEECH-LANGUAGE PATHOLOGIST

## 2018-01-01 PROCEDURE — 80048 BASIC METABOLIC PNL TOTAL CA: CPT | Performed by: PEDIATRICS

## 2018-01-01 PROCEDURE — 82728 ASSAY OF FERRITIN: CPT | Performed by: INTERNAL MEDICINE

## 2018-01-01 PROCEDURE — 80048 BASIC METABOLIC PNL TOTAL CA: CPT | Performed by: INTERNAL MEDICINE

## 2018-01-01 PROCEDURE — G0378 HOSPITAL OBSERVATION PER HR: HCPCS

## 2018-01-01 PROCEDURE — 25500064 ZZH RX 255 OP 636: Performed by: INTERNAL MEDICINE

## 2018-01-01 PROCEDURE — A9567 TECHNETIUM TC-99M AEROSOL: HCPCS | Performed by: PEDIATRICS

## 2018-01-01 PROCEDURE — 99233 SBSQ HOSP IP/OBS HIGH 50: CPT | Performed by: FAMILY MEDICINE

## 2018-01-01 PROCEDURE — 99239 HOSP IP/OBS DSCHRG MGMT >30: CPT | Performed by: FAMILY MEDICINE

## 2018-01-01 PROCEDURE — 83036 HEMOGLOBIN GLYCOSYLATED A1C: CPT | Performed by: PEDIATRICS

## 2018-01-01 PROCEDURE — 80053 COMPREHEN METABOLIC PANEL: CPT | Performed by: EMERGENCY MEDICINE

## 2018-01-01 PROCEDURE — 85027 COMPLETE CBC AUTOMATED: CPT | Performed by: PEDIATRICS

## 2018-01-01 PROCEDURE — 96372 THER/PROPH/DIAG INJ SC/IM: CPT

## 2018-01-01 PROCEDURE — 84484 ASSAY OF TROPONIN QUANT: CPT | Performed by: FAMILY MEDICINE

## 2018-01-01 PROCEDURE — 27210995 ZZH RX 272: Performed by: PEDIATRICS

## 2018-01-01 PROCEDURE — 85379 FIBRIN DEGRADATION QUANT: CPT | Performed by: INTERNAL MEDICINE

## 2018-01-01 PROCEDURE — 71046 X-RAY EXAM CHEST 2 VIEWS: CPT | Mod: TC

## 2018-01-01 PROCEDURE — 87081 CULTURE SCREEN ONLY: CPT | Performed by: FAMILY MEDICINE

## 2018-01-01 PROCEDURE — 85027 COMPLETE CBC AUTOMATED: CPT | Performed by: INTERNAL MEDICINE

## 2018-01-01 PROCEDURE — 93005 ELECTROCARDIOGRAM TRACING: CPT | Performed by: EMERGENCY MEDICINE

## 2018-01-01 PROCEDURE — 85379 FIBRIN DEGRADATION QUANT: CPT | Performed by: EMERGENCY MEDICINE

## 2018-01-01 PROCEDURE — 97110 THERAPEUTIC EXERCISES: CPT | Mod: GP | Performed by: PHYSICAL THERAPIST

## 2018-01-01 PROCEDURE — 84484 ASSAY OF TROPONIN QUANT: CPT | Performed by: EMERGENCY MEDICINE

## 2018-01-01 PROCEDURE — 97530 THERAPEUTIC ACTIVITIES: CPT | Mod: GP | Performed by: PHYSICAL THERAPIST

## 2018-01-01 PROCEDURE — 84300 ASSAY OF URINE SODIUM: CPT | Performed by: INTERNAL MEDICINE

## 2018-01-01 PROCEDURE — 96360 HYDRATION IV INFUSION INIT: CPT

## 2018-01-01 PROCEDURE — 86900 BLOOD TYPING SEROLOGIC ABO: CPT | Performed by: INTERNAL MEDICINE

## 2018-01-01 PROCEDURE — 97161 PT EVAL LOW COMPLEX 20 MIN: CPT | Mod: GP | Performed by: PHYSICAL THERAPIST

## 2018-01-01 PROCEDURE — 25000128 H RX IP 250 OP 636: Performed by: FAMILY MEDICINE

## 2018-01-01 PROCEDURE — 83690 ASSAY OF LIPASE: CPT | Performed by: EMERGENCY MEDICINE

## 2018-01-01 PROCEDURE — 83550 IRON BINDING TEST: CPT | Performed by: INTERNAL MEDICINE

## 2018-01-01 PROCEDURE — 40000270 ZZH STATISTIC OXYGEN  O2DAILY TECH TIME

## 2018-01-01 PROCEDURE — 86850 RBC ANTIBODY SCREEN: CPT | Performed by: INTERNAL MEDICINE

## 2018-01-01 PROCEDURE — 93306 TTE W/DOPPLER COMPLETE: CPT | Mod: 26 | Performed by: INTERNAL MEDICINE

## 2018-01-01 PROCEDURE — 84100 ASSAY OF PHOSPHORUS: CPT | Performed by: EMERGENCY MEDICINE

## 2018-01-01 PROCEDURE — 97165 OT EVAL LOW COMPLEX 30 MIN: CPT | Mod: GO | Performed by: OCCUPATIONAL THERAPIST

## 2018-01-01 PROCEDURE — 96374 THER/PROPH/DIAG INJ IV PUSH: CPT | Performed by: EMERGENCY MEDICINE

## 2018-01-01 PROCEDURE — 36415 COLL VENOUS BLD VENIPUNCTURE: CPT | Performed by: EMERGENCY MEDICINE

## 2018-01-01 PROCEDURE — 27210210 NM LUNG SCAN VENTILATION AND PERFUSION

## 2018-01-01 PROCEDURE — 25000131 ZZH RX MED GY IP 250 OP 636 PS 637: Mod: GY | Performed by: PEDIATRICS

## 2018-01-01 PROCEDURE — 99239 HOSP IP/OBS DSCHRG MGMT >30: CPT | Performed by: PEDIATRICS

## 2018-01-01 PROCEDURE — 40000264 ECHO COMPLETE WITH OPTISON

## 2018-01-01 PROCEDURE — 99207 ZZC MOONLIGHTING INDICATOR: CPT | Performed by: INTERNAL MEDICINE

## 2018-01-01 PROCEDURE — 84439 ASSAY OF FREE THYROXINE: CPT | Performed by: EMERGENCY MEDICINE

## 2018-01-01 PROCEDURE — 70450 CT HEAD/BRAIN W/O DYE: CPT

## 2018-01-01 RX ORDER — ASPIRIN 81 MG/1
81 TABLET ORAL DAILY
Status: DISCONTINUED | OUTPATIENT
Start: 2018-01-01 | End: 2018-01-01

## 2018-01-01 RX ORDER — FUROSEMIDE 10 MG/ML
20 INJECTION INTRAMUSCULAR; INTRAVENOUS ONCE
Status: COMPLETED | OUTPATIENT
Start: 2018-01-01 | End: 2018-01-01

## 2018-01-01 RX ORDER — WARFARIN SODIUM 1 MG/1
TABLET ORAL
Qty: 30 TABLET | Refills: 1 | COMMUNITY
Start: 2018-01-01

## 2018-01-01 RX ORDER — TAMSULOSIN HYDROCHLORIDE 0.4 MG/1
0.4 CAPSULE ORAL AT BEDTIME
Qty: 60 CAPSULE | DISCHARGE
Start: 2018-01-01

## 2018-01-01 RX ORDER — SODIUM CHLORIDE 9 MG/ML
INJECTION, SOLUTION INTRAVENOUS CONTINUOUS
Status: DISCONTINUED | OUTPATIENT
Start: 2018-01-01 | End: 2018-01-01

## 2018-01-01 RX ORDER — CLOPIDOGREL BISULFATE 75 MG/1
75 TABLET ORAL DAILY
Status: DISCONTINUED | OUTPATIENT
Start: 2018-01-01 | End: 2018-01-01 | Stop reason: HOSPADM

## 2018-01-01 RX ORDER — AMOXICILLIN 250 MG
2 CAPSULE ORAL 2 TIMES DAILY PRN
Status: DISCONTINUED | OUTPATIENT
Start: 2018-01-01 | End: 2018-01-01 | Stop reason: HOSPADM

## 2018-01-01 RX ORDER — WARFARIN SODIUM 1 MG/1
TABLET ORAL
Qty: 30 TABLET | Refills: 1 | COMMUNITY
Start: 2018-01-01 | End: 2018-01-01

## 2018-01-01 RX ORDER — WARFARIN SODIUM 5 MG/1
5 TABLET ORAL
Status: COMPLETED | OUTPATIENT
Start: 2018-01-01 | End: 2018-01-01

## 2018-01-01 RX ORDER — AMLODIPINE BESYLATE 5 MG/1
10 TABLET ORAL DAILY
Status: DISCONTINUED | OUTPATIENT
Start: 2018-01-01 | End: 2018-01-01 | Stop reason: HOSPADM

## 2018-01-01 RX ORDER — ACETAMINOPHEN 325 MG/1
650 TABLET ORAL EVERY 4 HOURS PRN
Status: DISCONTINUED | OUTPATIENT
Start: 2018-01-01 | End: 2018-01-01 | Stop reason: HOSPADM

## 2018-01-01 RX ORDER — WARFARIN SODIUM 2.5 MG/1
TABLET ORAL
Qty: 30 TABLET | Refills: 0 | COMMUNITY
Start: 2018-01-01 | End: 2018-01-01 | Stop reason: DRUGHIGH

## 2018-01-01 RX ORDER — METOPROLOL SUCCINATE 50 MG/1
50 TABLET, EXTENDED RELEASE ORAL DAILY
Qty: 30 TABLET | DISCHARGE
Start: 2018-01-01

## 2018-01-01 RX ORDER — TAMSULOSIN HYDROCHLORIDE 0.4 MG/1
0.4 CAPSULE ORAL AT BEDTIME
Status: DISCONTINUED | OUTPATIENT
Start: 2018-01-01 | End: 2018-01-01 | Stop reason: HOSPADM

## 2018-01-01 RX ORDER — SODIUM CHLORIDE 9 MG/ML
1000 INJECTION, SOLUTION INTRAVENOUS CONTINUOUS
Status: DISCONTINUED | OUTPATIENT
Start: 2018-01-01 | End: 2018-01-01

## 2018-01-01 RX ORDER — FINASTERIDE 5 MG/1
1 TABLET, FILM COATED ORAL DAILY
Qty: 30 TABLET | Refills: 0 | DISCHARGE
Start: 2018-01-01

## 2018-01-01 RX ORDER — ONDANSETRON 4 MG/1
4 TABLET, ORALLY DISINTEGRATING ORAL EVERY 6 HOURS PRN
Status: DISCONTINUED | OUTPATIENT
Start: 2018-01-01 | End: 2018-01-01 | Stop reason: HOSPADM

## 2018-01-01 RX ORDER — NALOXONE HYDROCHLORIDE 0.4 MG/ML
.1-.4 INJECTION, SOLUTION INTRAMUSCULAR; INTRAVENOUS; SUBCUTANEOUS
Status: DISCONTINUED | OUTPATIENT
Start: 2018-01-01 | End: 2018-01-01 | Stop reason: HOSPADM

## 2018-01-01 RX ORDER — PROCHLORPERAZINE 25 MG
12.5 SUPPOSITORY, RECTAL RECTAL EVERY 12 HOURS PRN
Status: DISCONTINUED | OUTPATIENT
Start: 2018-01-01 | End: 2018-01-01 | Stop reason: HOSPADM

## 2018-01-01 RX ORDER — ONDANSETRON 2 MG/ML
4 INJECTION INTRAMUSCULAR; INTRAVENOUS EVERY 6 HOURS PRN
Status: DISCONTINUED | OUTPATIENT
Start: 2018-01-01 | End: 2018-01-01 | Stop reason: HOSPADM

## 2018-01-01 RX ORDER — ASPIRIN 81 MG/1
81 TABLET ORAL DAILY
Status: DISCONTINUED | OUTPATIENT
Start: 2018-01-01 | End: 2018-01-01 | Stop reason: HOSPADM

## 2018-01-01 RX ORDER — AMOXICILLIN 250 MG
1 CAPSULE ORAL 2 TIMES DAILY PRN
Status: DISCONTINUED | OUTPATIENT
Start: 2018-01-01 | End: 2018-01-01 | Stop reason: HOSPADM

## 2018-01-01 RX ORDER — NITROGLYCERIN 0.4 MG/1
0.4 TABLET SUBLINGUAL EVERY 5 MIN PRN
Status: DISCONTINUED | OUTPATIENT
Start: 2018-01-01 | End: 2018-01-01 | Stop reason: HOSPADM

## 2018-01-01 RX ORDER — SERTRALINE HYDROCHLORIDE 25 MG/1
25 TABLET, FILM COATED ORAL DAILY
Qty: 30 TABLET | DISCHARGE
Start: 2018-01-01

## 2018-01-01 RX ORDER — PROCHLORPERAZINE MALEATE 5 MG
5 TABLET ORAL EVERY 6 HOURS PRN
Status: DISCONTINUED | OUTPATIENT
Start: 2018-01-01 | End: 2018-01-01 | Stop reason: HOSPADM

## 2018-01-01 RX ORDER — WARFARIN SODIUM 2.5 MG/1
2.5 TABLET ORAL
Status: COMPLETED | OUTPATIENT
Start: 2018-01-01 | End: 2018-01-01

## 2018-01-01 RX ORDER — AMLODIPINE BESYLATE 10 MG/1
10 TABLET ORAL DAILY
Qty: 30 TABLET | DISCHARGE
Start: 2018-01-01

## 2018-01-01 RX ORDER — NICOTINE POLACRILEX 4 MG
15-30 LOZENGE BUCCAL
Status: DISCONTINUED | OUTPATIENT
Start: 2018-01-01 | End: 2018-01-01 | Stop reason: HOSPADM

## 2018-01-01 RX ORDER — SODIUM CHLORIDE 450 MG/100ML
INJECTION, SOLUTION INTRAVENOUS CONTINUOUS
Status: DISCONTINUED | OUTPATIENT
Start: 2018-01-01 | End: 2018-01-01

## 2018-01-01 RX ORDER — ACETAMINOPHEN 325 MG/1
650 TABLET ORAL EVERY 4 HOURS PRN
Qty: 100 TABLET | DISCHARGE
Start: 2018-01-01

## 2018-01-01 RX ORDER — METOPROLOL SUCCINATE 50 MG/1
50 TABLET, EXTENDED RELEASE ORAL DAILY
Status: DISCONTINUED | OUTPATIENT
Start: 2018-01-01 | End: 2018-01-01 | Stop reason: HOSPADM

## 2018-01-01 RX ORDER — WARFARIN SODIUM 1 MG/1
1 TABLET ORAL DAILY
Qty: 30 TABLET | Refills: 1 | Status: SHIPPED | OUTPATIENT
Start: 2018-01-01 | End: 2018-01-01

## 2018-01-01 RX ORDER — DEXTROSE MONOHYDRATE 25 G/50ML
25-50 INJECTION, SOLUTION INTRAVENOUS
Status: DISCONTINUED | OUTPATIENT
Start: 2018-01-01 | End: 2018-01-01 | Stop reason: HOSPADM

## 2018-01-01 RX ORDER — CLOPIDOGREL BISULFATE 75 MG/1
TABLET ORAL
Qty: 30 TABLET | DISCHARGE
Start: 2018-01-01

## 2018-01-01 RX ORDER — HEPARIN SODIUM 10000 [USP'U]/100ML
0-3500 INJECTION, SOLUTION INTRAVENOUS CONTINUOUS
Status: DISCONTINUED | OUTPATIENT
Start: 2018-01-01 | End: 2018-01-01

## 2018-01-01 RX ORDER — NITROGLYCERIN 0.4 MG/1
TABLET SUBLINGUAL
Qty: 25 TABLET | DISCHARGE
Start: 2018-01-01

## 2018-01-01 RX ORDER — ANTIOX #8/OM3/DHA/EPA/LUT/ZEAX 250-2.5 MG
1 CAPSULE ORAL 2 TIMES DAILY
COMMUNITY
Start: 2018-01-01

## 2018-01-01 RX ORDER — ATORVASTATIN CALCIUM 20 MG/1
20 TABLET, FILM COATED ORAL DAILY
Qty: 30 TABLET | DISCHARGE
Start: 2018-01-01

## 2018-01-01 RX ORDER — FINASTERIDE 5 MG/1
5 TABLET, FILM COATED ORAL DAILY
Status: DISCONTINUED | OUTPATIENT
Start: 2018-01-01 | End: 2018-01-01 | Stop reason: HOSPADM

## 2018-01-01 RX ADMIN — ASPIRIN 81 MG: 81 TABLET, COATED ORAL at 09:35

## 2018-01-01 RX ADMIN — INSULIN ASPART 1 UNITS: 100 INJECTION, SOLUTION INTRAVENOUS; SUBCUTANEOUS at 11:52

## 2018-01-01 RX ADMIN — HEPARIN SODIUM 1250 UNITS/HR: 10000 INJECTION, SOLUTION INTRAVENOUS at 08:16

## 2018-01-01 RX ADMIN — HEPARIN SODIUM 1150 UNITS/HR: 10000 INJECTION, SOLUTION INTRAVENOUS at 00:48

## 2018-01-01 RX ADMIN — METOPROLOL SUCCINATE 50 MG: 50 TABLET, EXTENDED RELEASE ORAL at 08:14

## 2018-01-01 RX ADMIN — OMEPRAZOLE 20 MG: 20 CAPSULE, DELAYED RELEASE ORAL at 06:49

## 2018-01-01 RX ADMIN — DEXTRAN 70, AND HYPROMELLOSE 2910 1 DROP: 1; 3 SOLUTION/ DROPS OPHTHALMIC at 21:50

## 2018-01-01 RX ADMIN — FINASTERIDE 5 MG: 5 TABLET, FILM COATED ORAL at 09:11

## 2018-01-01 RX ADMIN — HEPARIN SODIUM 1150 UNITS/HR: 10000 INJECTION, SOLUTION INTRAVENOUS at 16:50

## 2018-01-01 RX ADMIN — HEPARIN SODIUM 1150 UNITS/HR: 10000 INJECTION, SOLUTION INTRAVENOUS at 22:18

## 2018-01-01 RX ADMIN — DEXTRAN 70, AND HYPROMELLOSE 2910 1 DROP: 1; 3 SOLUTION/ DROPS OPHTHALMIC at 08:08

## 2018-01-01 RX ADMIN — INSULIN GLARGINE 7 UNITS: 100 INJECTION, SOLUTION SUBCUTANEOUS at 08:32

## 2018-01-01 RX ADMIN — TAMSULOSIN HYDROCHLORIDE 0.4 MG: 0.4 CAPSULE ORAL at 21:23

## 2018-01-01 RX ADMIN — DEXTRAN 70, AND HYPROMELLOSE 2910 1 DROP: 1; 3 SOLUTION/ DROPS OPHTHALMIC at 08:14

## 2018-01-01 RX ADMIN — KIT FOR THE PREPARATION OF TECHNETIUM TC 99M PENTETATE 70 MILLICURIE: 20 INJECTION, POWDER, LYOPHILIZED, FOR SOLUTION INTRAVENOUS; RESPIRATORY (INHALATION) at 05:30

## 2018-01-01 RX ADMIN — CLOPIDOGREL BISULFATE 75 MG: 75 TABLET, FILM COATED ORAL at 09:11

## 2018-01-01 RX ADMIN — SODIUM CHLORIDE 1000 ML: 9 INJECTION, SOLUTION INTRAVENOUS at 21:20

## 2018-01-01 RX ADMIN — METOPROLOL SUCCINATE 50 MG: 50 TABLET, EXTENDED RELEASE ORAL at 09:11

## 2018-01-01 RX ADMIN — SODIUM CHLORIDE: 4.5 INJECTION, SOLUTION INTRAVENOUS at 08:08

## 2018-01-01 RX ADMIN — ACETAMINOPHEN 650 MG: 325 TABLET ORAL at 05:43

## 2018-01-01 RX ADMIN — ACETAMINOPHEN 650 MG: 325 TABLET ORAL at 11:48

## 2018-01-01 RX ADMIN — WARFARIN SODIUM 5 MG: 5 TABLET ORAL at 17:30

## 2018-01-01 RX ADMIN — DEXTRAN 70, AND HYPROMELLOSE 2910 1 DROP: 1; 3 SOLUTION/ DROPS OPHTHALMIC at 11:50

## 2018-01-01 RX ADMIN — DEXTRAN 70, AND HYPROMELLOSE 2910 1 DROP: 1; 3 SOLUTION/ DROPS OPHTHALMIC at 12:28

## 2018-01-01 RX ADMIN — OMEPRAZOLE 20 MG: 20 CAPSULE, DELAYED RELEASE ORAL at 09:35

## 2018-01-01 RX ADMIN — SODIUM CHLORIDE 1000 ML: 9 INJECTION, SOLUTION INTRAVENOUS at 16:29

## 2018-01-01 RX ADMIN — DEXTRAN 70, AND HYPROMELLOSE 2910 1 DROP: 1; 3 SOLUTION/ DROPS OPHTHALMIC at 21:21

## 2018-01-01 RX ADMIN — DEXTRAN 70, AND HYPROMELLOSE 2910 1 DROP: 1; 3 SOLUTION/ DROPS OPHTHALMIC at 08:10

## 2018-01-01 RX ADMIN — METOPROLOL SUCCINATE 50 MG: 50 TABLET, EXTENDED RELEASE ORAL at 08:32

## 2018-01-01 RX ADMIN — AMLODIPINE BESYLATE 10 MG: 5 TABLET ORAL at 09:35

## 2018-01-01 RX ADMIN — DEXTRAN 70, AND HYPROMELLOSE 2910 1 DROP: 1; 3 SOLUTION/ DROPS OPHTHALMIC at 14:00

## 2018-01-01 RX ADMIN — INSULIN ASPART 1 UNITS: 100 INJECTION, SOLUTION INTRAVENOUS; SUBCUTANEOUS at 12:28

## 2018-01-01 RX ADMIN — DEXTRAN 70, AND HYPROMELLOSE 2910 1 DROP: 1; 3 SOLUTION/ DROPS OPHTHALMIC at 08:33

## 2018-01-01 RX ADMIN — INSULIN GLARGINE 8 UNITS: 100 INJECTION, SOLUTION SUBCUTANEOUS at 08:12

## 2018-01-01 RX ADMIN — CLOPIDOGREL 75 MG: 75 TABLET, FILM COATED ORAL at 09:35

## 2018-01-01 RX ADMIN — DEXTRAN 70, AND HYPROMELLOSE 2910 1 DROP: 1; 3 SOLUTION/ DROPS OPHTHALMIC at 15:58

## 2018-01-01 RX ADMIN — CLOPIDOGREL BISULFATE 75 MG: 75 TABLET, FILM COATED ORAL at 08:09

## 2018-01-01 RX ADMIN — DEXTRAN 70, AND HYPROMELLOSE 2910 1 DROP: 1; 3 SOLUTION/ DROPS OPHTHALMIC at 16:59

## 2018-01-01 RX ADMIN — INSULIN ASPART 1 UNITS: 100 INJECTION, SOLUTION INTRAVENOUS; SUBCUTANEOUS at 12:26

## 2018-01-01 RX ADMIN — OMEPRAZOLE 20 MG: 20 CAPSULE, DELAYED RELEASE ORAL at 05:04

## 2018-01-01 RX ADMIN — SODIUM CHLORIDE 1000 ML: 9 INJECTION, SOLUTION INTRAVENOUS at 19:58

## 2018-01-01 RX ADMIN — INSULIN ASPART 1 UNITS: 100 INJECTION, SOLUTION INTRAVENOUS; SUBCUTANEOUS at 16:53

## 2018-01-01 RX ADMIN — WARFARIN SODIUM 5 MG: 5 TABLET ORAL at 18:10

## 2018-01-01 RX ADMIN — DEXTRAN 70, AND HYPROMELLOSE 2910 1 DROP: 1; 3 SOLUTION/ DROPS OPHTHALMIC at 21:24

## 2018-01-01 RX ADMIN — TAMSULOSIN HYDROCHLORIDE 0.4 MG: 0.4 CAPSULE ORAL at 21:50

## 2018-01-01 RX ADMIN — WARFARIN SODIUM 2.5 MG: 2.5 TABLET ORAL at 17:01

## 2018-01-01 RX ADMIN — FUROSEMIDE 20 MG: 10 INJECTION, SOLUTION INTRAVENOUS at 13:28

## 2018-01-01 RX ADMIN — CLOPIDOGREL 75 MG: 75 TABLET, FILM COATED ORAL at 08:13

## 2018-01-01 RX ADMIN — OMEPRAZOLE 20 MG: 20 CAPSULE, DELAYED RELEASE ORAL at 06:32

## 2018-01-01 RX ADMIN — SODIUM CHLORIDE: 4.5 INJECTION, SOLUTION INTRAVENOUS at 11:39

## 2018-01-01 RX ADMIN — METOPROLOL SUCCINATE 50 MG: 50 TABLET, EXTENDED RELEASE ORAL at 09:35

## 2018-01-01 RX ADMIN — DEXTRAN 70, AND HYPROMELLOSE 2910 1 DROP: 1; 3 SOLUTION/ DROPS OPHTHALMIC at 18:08

## 2018-01-01 RX ADMIN — ASPIRIN 81 MG: 81 TABLET, COATED ORAL at 08:09

## 2018-01-01 RX ADMIN — DEXTRAN 70, AND HYPROMELLOSE 2910 1 DROP: 1; 3 SOLUTION/ DROPS OPHTHALMIC at 17:02

## 2018-01-01 RX ADMIN — SODIUM CHLORIDE: 4.5 INJECTION, SOLUTION INTRAVENOUS at 22:19

## 2018-01-01 RX ADMIN — FINASTERIDE 5 MG: 5 TABLET, FILM COATED ORAL at 08:08

## 2018-01-01 RX ADMIN — DEXTRAN 70, AND HYPROMELLOSE 2910 1 DROP: 1; 3 SOLUTION/ DROPS OPHTHALMIC at 21:40

## 2018-01-01 RX ADMIN — INSULIN GLARGINE 7 UNITS: 100 INJECTION, SOLUTION SUBCUTANEOUS at 09:11

## 2018-01-01 RX ADMIN — SODIUM CHLORIDE: 9 INJECTION, SOLUTION INTRAVENOUS at 03:35

## 2018-01-01 RX ADMIN — SODIUM CHLORIDE: 9 INJECTION, SOLUTION INTRAVENOUS at 07:46

## 2018-01-01 RX ADMIN — ASPIRIN 81 MG: 81 TABLET, COATED ORAL at 09:11

## 2018-01-01 RX ADMIN — TAMSULOSIN HYDROCHLORIDE 0.4 MG: 0.4 CAPSULE ORAL at 21:39

## 2018-01-01 RX ADMIN — CLOPIDOGREL BISULFATE 75 MG: 75 TABLET, FILM COATED ORAL at 08:32

## 2018-01-01 RX ADMIN — DEXTRAN 70, AND HYPROMELLOSE 2910 1 DROP: 1; 3 SOLUTION/ DROPS OPHTHALMIC at 11:52

## 2018-01-01 RX ADMIN — HUMAN ALBUMIN MICROSPHERES AND PERFLUTREN 3 ML: 10; .22 INJECTION, SOLUTION INTRAVENOUS at 15:31

## 2018-01-01 RX ADMIN — INSULIN GLARGINE 8 UNITS: 100 INJECTION, SOLUTION SUBCUTANEOUS at 07:57

## 2018-01-01 RX ADMIN — OMEPRAZOLE 20 MG: 20 CAPSULE, DELAYED RELEASE ORAL at 08:13

## 2018-01-01 RX ADMIN — INSULIN GLARGINE 7 UNITS: 100 INJECTION, SOLUTION SUBCUTANEOUS at 08:09

## 2018-01-01 RX ADMIN — FINASTERIDE 5 MG: 5 TABLET, FILM COATED ORAL at 08:32

## 2018-01-01 RX ADMIN — SODIUM CHLORIDE: 9 INJECTION, SOLUTION INTRAVENOUS at 19:12

## 2018-01-01 RX ADMIN — Medication 4.9 MILLICURIE: at 06:30

## 2018-01-01 RX ADMIN — ASPIRIN 81 MG: 81 TABLET, COATED ORAL at 08:13

## 2018-01-01 RX ADMIN — SODIUM CHLORIDE: 9 INJECTION, SOLUTION INTRAVENOUS at 02:34

## 2018-01-01 RX ADMIN — DEXTRAN 70, AND HYPROMELLOSE 2910 1 DROP: 1; 3 SOLUTION/ DROPS OPHTHALMIC at 21:23

## 2018-01-01 RX ADMIN — AMLODIPINE BESYLATE 10 MG: 5 TABLET ORAL at 08:13

## 2018-01-01 RX ADMIN — DEXTRAN 70, AND HYPROMELLOSE 2910 1 DROP: 1; 3 SOLUTION/ DROPS OPHTHALMIC at 12:27

## 2018-01-01 RX ADMIN — DEXTRAN 70, AND HYPROMELLOSE 2910 1 DROP: 1; 3 SOLUTION/ DROPS OPHTHALMIC at 09:16

## 2018-01-01 RX ADMIN — OMEPRAZOLE 20 MG: 20 CAPSULE, DELAYED RELEASE ORAL at 08:08

## 2018-01-01 RX ADMIN — METOPROLOL SUCCINATE 50 MG: 50 TABLET, EXTENDED RELEASE ORAL at 08:09

## 2018-01-01 RX ADMIN — SODIUM CHLORIDE 1000 ML: 9 INJECTION, SOLUTION INTRAVENOUS at 12:27

## 2018-01-01 RX ADMIN — INSULIN GLARGINE 7 UNITS: 100 INJECTION, SOLUTION SUBCUTANEOUS at 09:10

## 2018-01-01 RX ADMIN — DEXTRAN 70, AND HYPROMELLOSE 2910 1 DROP: 1; 3 SOLUTION/ DROPS OPHTHALMIC at 07:58

## 2018-01-01 ASSESSMENT — ACTIVITIES OF DAILY LIVING (ADL)
FALL_HISTORY_WITHIN_LAST_SIX_MONTHS: YES
DRESS: 0-->INDEPENDENT
SWALLOWING: 2-->DIFFICULTY SWALLOWING LIQUIDS
RETIRED_COMMUNICATION: 2-->DIFFICULTY UNDERSTANDING (NOT RELATED TO LANGUAGE BARRIER)
TOILETING: 0-->INDEPENDENT
RETIRED_EATING: 0-->INDEPENDENT
COGNITION: 1 - ATTENTION OR MEMORY DEFICITS
BATHING: 2-->ASSISTIVE PERSON
TRANSFERRING: 1-->ASSISTIVE EQUIPMENT
AMBULATION: 1-->ASSISTIVE EQUIPMENT
NUMBER_OF_TIMES_PATIENT_HAS_FALLEN_WITHIN_LAST_SIX_MONTHS: 6

## 2018-01-01 ASSESSMENT — PAIN SCALES - GENERAL: PAINLEVEL: NO PAIN (0)

## 2018-01-10 PROBLEM — R06.02 SOB (SHORTNESS OF BREATH): Status: ACTIVE | Noted: 2018-01-01

## 2018-01-10 PROBLEM — R79.89 ELEVATED BRAIN NATRIURETIC PEPTIDE (BNP) LEVEL: Status: ACTIVE | Noted: 2018-01-01

## 2018-01-10 PROBLEM — R09.02 HYPOXIA: Status: ACTIVE | Noted: 2018-01-01

## 2018-01-10 PROBLEM — R79.89 ELEVATED TROPONIN: Status: ACTIVE | Noted: 2018-01-01

## 2018-01-10 PROBLEM — I27.20 PULMONARY HYPERTENSION (H): Status: ACTIVE | Noted: 2018-01-01

## 2018-01-10 PROBLEM — R19.7 DIARRHEA: Status: ACTIVE | Noted: 2018-01-01

## 2018-01-10 PROBLEM — I25.10 CORONARY ARTERY DISEASE INVOLVING NATIVE CORONARY ARTERY: Status: ACTIVE | Noted: 2017-01-01

## 2018-01-10 NOTE — IP AVS SNAPSHOT
"    [address] MEDICAL SURGICAL: [phone]                                              INTERAGENCY TRANSFER FORM - PHYSICIAN ORDERS   1/10/2018                    Hospital Admission Date: 1/10/2018  JANELLEKADEN DENISE   : 1925  Sex: Male        Attending Provider: Chika De La Vega MD     Allergies:  Fluoxetine, Lisinopril    Infection:  None   Service:  FAMILY MEDIC    Ht:  1.778 m (5' 10\")   Wt:  71.2 kg (156 lb 15.5 oz)   Admission Wt:  72 kg (158 lb 11.7 oz)    BMI:  22.52 kg/m 2   BSA:  1.88 m 2            Patient PCP Information     Provider PCP Type    Sony Berkowitz MD General      ED Clinical Impression     Diagnosis Description Comment Added By Time Added    Weakness [R53.1] Weakness [R53.1]  Rita Salter MD 1/10/2018  4:27 PM    Confusion [R41.0] Confusion [R41.0]  Rita Salter MD 1/10/2018  4:27 PM    Elevated brain natriuretic peptide (BNP) level [R79.89] Elevated brain natriuretic peptide (BNP) level [R79.89]  Rita Salter MD 1/10/2018  4:27 PM      Hospital Problems as of 2018              Priority Class Noted POA    Diabetes mellitus, type 2 (H) Medium  2003 Yes    Advanced directives, counseling/discussion Low  2012 Yes    CKD (chronic kidney disease) stage 4, 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) Medium  3/25/2013 Yes    Weakness Medium  2016 Yes    Coronary artery disease involving native coronary artery - angio in  with multivessel disease managed medically Medium  2017 Yes    * (Principal)Diarrhea Medium  1/10/2018 Yes    SOB (shortness of breath) Medium  1/10/2018 Yes    Hypoxia Medium  1/10/2018 Yes    Elevated brain natriuretic peptide (BNP) level Medium  1/10/2018 Yes    Pulmonary hypertension Medium  1/10/2018 Yes    Elevated troponin Medium  1/10/2018 Yes    Decreased oral intake Medium  2018 Yes    Dysphagia Medium  2018 Yes    Dysarthria Medium  2018 No      Non-Hospital Problems as of " 1/12/2018              Priority Class Noted    Essential hypertension, benign Low  7/7/2006    Coronary atherosclerosis Low  7/7/2006    CARDIOVASCULAR SCREENING; LDL GOAL LESS THAN 100 Medium  10/31/2010    Acute viral bronchitis High  3/25/2013    Pneumonia High  3/25/2013    Cough High  3/25/2013    Acute respiratory failure with hypoxia (H) High  3/25/2013    Bronchitis Medium  3/25/2013    Diabetes type 2, uncontrolled (H) Medium  3/25/2013    Thrombocytopenia (H) Low  3/25/2013    Sepsis (H) High  3/27/2013    Cardiac pacemaker in situ Medium  8/19/2015    Elevated lactic acid level Medium  8/17/2016    CKD (chronic kidney disease) stage 3, GFR 30-59 ml/min Medium  8/17/2016    Fall Medium  9/21/2017    Acute kidney injury (H) Medium  9/21/2017    Hypernatremia Medium  9/21/2017    Hypovolemia Medium  9/22/2017      Code Status History     Date Active Date Inactive Code Status Order ID Comments User Context    1/12/2018 10:53 AM  DNR/DNI [id number]  Chika De La Vega MD Outpatient    1/11/2018 11:30 AM 1/12/2018 10:53 AM DNR/DNI [id number]  Chika De La Vega MD Inpatient    1/10/2018  8:27 PM 1/11/2018 11:30 AM DNR [id number]  Peter Vann MD Inpatient    9/22/2017  1:37 PM 1/10/2018  8:27 PM DNR [id number]  Kurt Carrillo MD Outpatient    9/21/2017 11:43 PM 9/22/2017  1:37 PM DNR [id number]  Peter Vann MD Inpatient    8/17/2016 10:21 AM 9/21/2017 11:43 PM DNR [id number]  Kurt Carrillo MD Outpatient    8/17/2016  1:08 AM 8/17/2016 10:21 AM DNR [id number]  Cash Mcnamara MD Inpatient    3/29/2013 10:28 AM 8/17/2016  1:08 AM DNR [id number]  Kurt Carrillo MD Outpatient    3/25/2013  4:11 PM 3/29/2013 10:28 AM DNR [id number]  Joan Hill PA-C Inpatient    3/25/2013  3:28 PM 3/25/2013  4:11 PM Full Code [id number]  Favian Beal RN Inpatient         Medication Review      START taking        Dose / Directions Comments    insulin glargine 100 UNIT/ML injection    Commonly known as:  LANTUS   Replaces:  LANTUS VIAL 100 UNIT/ML injection        Dose:  8 Units   Inject 8 Units Subcutaneous every morning   Refills:  0        order for DME        Equipment being ordered: Oxygen - patient needs 1L [place] oxygen continuously during transportation to rehab facility.   Quantity:  1 Device   Refills:  0          CONTINUE these medications which may have CHANGED, or have new prescriptions. If we are uncertain of the size of tablets/capsules you have at home, strength may be listed as something that might have changed.        Dose / Directions Comments    acetaminophen 325 MG tablet   Commonly known as:  TYLENOL   This may have changed:    - medication strength  - how much to take  - how to take this  - when to take this  - reasons to take this  - additional instructions   Used for:  Pain        Dose:  650 mg   Take 2 tablets (650 mg) by mouth every 4 hours as needed for mild pain   Quantity:  100 tablet   Refills:  0        amLODIPine 10 MG tablet   Commonly known as:  NORVASC   This may have changed:  See the new instructions.   Used for:  Essential hypertension, benign        Dose:  10 mg   Take 1 tablet (10 mg) by mouth daily   Quantity:  30 tablet   Refills:  0        aspirin 81 MG tablet   This may have changed:  See the new instructions.   Used for:  Atherosclerosis of coronary artery of native heart, angina presence unspecified, unspecified vessel or lesion type        Dose:  81 mg   Take 1 tablet (81 mg) by mouth daily   Quantity:  30 tablet   Refills:  0        atorvastatin 20 MG tablet   Commonly known as:  LIPITOR   This may have changed:  medication strength   Used for:  Atherosclerosis of coronary artery of native heart, angina presence unspecified, unspecified vessel or lesion type        Dose:  20 mg   Take 1 tablet (20 mg) by mouth daily   Quantity:  30 tablet   Refills:  0        clopidogrel 75 MG tablet   Commonly known as:  PLAVIX   This may have changed:  See the new  instructions.   Used for:  Atherosclerosis of coronary artery of native heart, angina presence unspecified, unspecified vessel or lesion type        1 TABLET DAILY   Quantity:  30 tablet   Refills:  0        finasteride 5 MG tablet   Commonly known as:  PROSCAR   This may have changed:  See the new instructions.   Used for:  Benign prostatic hyperplasia, unspecified whether lower urinary tract symptoms present        Dose:  1 tablet   Take 1 tablet (5 mg) by mouth daily   Quantity:  30 tablet   Refills:  0        metoprolol succinate 50 MG 24 hr tablet   Commonly known as:  TOPROL-XL   This may have changed:  medication strength   Used for:  Atherosclerosis of coronary artery of native heart, angina presence unspecified, unspecified vessel or lesion type        Dose:  50 mg   Take 1 tablet (50 mg) by mouth daily   Quantity:  30 tablet   Refills:  0        nitroGLYcerin 0.4 MG sublingual tablet   Commonly known as:  NITROSTAT   This may have changed:  See the new instructions.   Used for:  Atherosclerosis of coronary artery of native heart, angina presence unspecified, unspecified vessel or lesion type        1 TAB EVERY 5 MIN AS NEEDED FOR CHEST PAIN, UP TO 3 PER EPISODE   Quantity:  25 tablet   Refills:  0        omeprazole 20 MG CR capsule   Commonly known as:  priLOSEC   This may have changed:  See the new instructions.   Used for:  Gastroesophageal reflux disease without esophagitis        1 CAPSULE DAILY   Quantity:  30 capsule   Refills:  0        tamsulosin 0.4 MG capsule   Commonly known as:  FLOMAX   This may have changed:  See the new instructions.   Used for:  Benign prostatic hyperplasia, unspecified whether lower urinary tract symptoms present        Dose:  0.4 mg   Take 1 capsule (0.4 mg) by mouth At Bedtime   Quantity:  60 capsule   Refills:  0          CONTINUE these medications which have NOT CHANGED        Dose / Directions Comments    hypromellose 0.3 % Soln ophthalmic solution   Commonly known  as:  GENTEAL        Dose:  1 drop   1 drop 4 times daily   Refills:  0        * order for DME   Used for:  Low back pain, Fall, initial encounter        Equipment being ordered: 4 wheel walker with brakes and seat   Quantity:  1 Device   Refills:  0        * order for DME   Used for:  Sepsis(995.91), Falls frequently        Equipment being ordered: walker   Quantity:  1 Device   Refills:  0        * order for DME   Used for:  Sepsis(995.91), Falls frequently        Equipment being ordered: transfer bridge   Quantity:  1 Device   Refills:  0        PRESERVISION AREDS 2 PO        Dose:  2 tablet   Take 2 tablets by mouth daily One in AM; one in PM   Refills:  0        RENAL MULTIVITAMIN FORMULA PO        Dose:  1 capsule   Take 1 capsule by mouth every morning   Refills:  0        sertraline 25 MG tablet   Commonly known as:  ZOLOFT   Used for:  Major depression in complete remission (H)        Dose:  25 mg   Take 1 tablet (25 mg) by mouth daily   Quantity:  30 tablet   Refills:  0        * Notice:  This list has 3 medication(s) that are the same as other medications prescribed for you. Read the directions carefully, and ask your doctor or other care provider to review them with you.      STOP taking     LANTUS VIAL 100 UNIT/ML injection   Generic drug:  insulin glargine   Replaced by:  insulin glargine 100 UNIT/ML injection                   Summary of Visit     Reason for your hospital stay       Worsening weakness - during this hospital stay you had en episode of increased confusion, difficulty with speech and severe weakness, however no cause could be found even when looked for.  Thankfully you have appeared to completely recover from that and are looking wonderful today.  [facility]             After Care     Activity - Up with nursing assistance           Advance Diet as Tolerated       Follow this diet upon discharge:       Dysphagia Diet Level 2  Mech Altered Nectar Thickened Liquids (pre-thickened or use instant food thickener)       General info for SNF       Length of Stay Estimate: Short Term Care: Estimated # of Days <30  Condition at Discharge: Improving  Level of care:skilled   Rehabilitation Potential: Good  Admission H&P remains valid and up-to-date: Yes  Recent Chemotherapy: N/A  Use Nursing Home Standing Orders: Yes       Glucose monitor nursing POCT       Before meals and at bedtime       Mantoux instructions       Give two-step Mantoux (PPD) Per Facility Policy Yes             Procedures     Oxygen - Nasal cannula       1-2 Lpm by nasal cannula to keep O2 sats 90% or greater.             Referrals     Occupational Therapy Adult Consult       Evaluate and treat as clinically indicated.    Reason:  Generalized muscle weakness, episode of severe weakness associated with dysarthria and dysphagia of unclear etiology but improving       Physical Therapy Adult Consult       Evaluate and treat as clinically indicated.    Reason:  Generalized muscle weakness, episode of severe weakness associated with dysarthria and dysphagia of unclear etiology but improving       Speech Language Path Adult Consult       Evaluate and treat as clinically indicated.    Reason:  Episode of dysphagia and dysarthria of unclear etiology             Follow-Up Appointment Instructions     Future Labs/Procedures    Follow Up and recommended labs and tests     Comments:    Follow up with rehab or primary care physician within 1 week      Follow-Up Appointment Instructions     Follow Up and recommended labs and tests       Follow up with rehab or primary care physician within 1 week             Statement of Approval     Ordered          01/12/18 1057  I have reviewed and agree with all the recommendations and orders detailed in this document.  EFFECTIVE NOW     Approved and electronically signed by:  Chika De La Vega MD

## 2018-01-10 NOTE — LETTER
Transition Communication Hand-off for Care Transitions to Next Level of Care Provider    Name: Shaun Gotti  MRN #: [id number]  Primary Care Provider: Sony Berkowitz  Primary Care MD Name: Dr. Sony Berkowitz   Primary Clinic: [facility] [address] [place] [address]  Primary Care Clinic Name: [facility]   Reason for Hospitalization:  Confusion [R41.0]  Weakness [R53.1]  Elevated brain natriuretic peptide (BNP) level [R79.89]  Admit Date/Time: 1/10/2018 11:45 AM  Discharge Date: 1/12/18  Payor Source: Payor: BCBS / Plan: BCBS PLATINUM BLUE / Product Type: PPO /     Readmission Assessment Measure (DAPHNE) Risk Score/category: Average          Reason for Communication Hand-off Referral: Fragility  Other Pt d/c to the [facility] for short term rehab placement    Discharge Plan:  Discharge Plan:       Most Recent Value    Disposition Comments Looking at possible placement for short term rehab at [facility]            Concern for non-adherence with plan of care:   Y/N no   Discharge Needs Assessment:  Needs       Most Recent Value    Equipment Currently Used at Home walker, rolling, shower chair, grab bar [in both shower and surrounding toilet ]    Transportation Available car, family or friend will provide    # of Referrals Placed by CTS Post Acute Facilities          Already enrolled in Tele-monitoring program and name of program:  no   Follow-up specialty is recommended: No    Follow-up plan:  No future appointments.    Any outstanding tests or procedures:    Procedures     Future Labs/Procedures    Oxygen - Nasal cannula     Comments:    1-2 Lpm by nasal cannula to keep O2 sats 90% or greater.          Referrals     Future Labs/Procedures    Occupational Therapy Adult Consult     Comments:    Evaluate and treat as clinically indicated.    Reason:  Generalized muscle weakness, episode of severe weakness associated with dysarthria and dysphagia of unclear etiology but improving     Physical Therapy Adult Consult     Comments:    Evaluate and treat as clinically indicated.    Reason:  Generalized muscle weakness, episode of severe weakness associated with dysarthria and dysphagia of unclear etiology but improving    Speech Language Path Adult Consult     Comments:    Evaluate and treat as clinically indicated.    Reason:  Episode of dysphagia and dysarthria of unclear etiology            Key Recommendations:  Short term rehab until strong enough to manage back at apartment.  Family is very supportive.     TRISTAN BRIGHT    AVS/Discharge Summary is the source of truth; this is a helpful guide for improved communication of patient story

## 2018-01-10 NOTE — IP AVS SNAPSHOT
[address] [facility]    [address] [place] [address]    Phone:  [phone]                                       After Visit Summary   1/10/2018    Shaun Gotti    MRN: [id number]           After Visit Summary Signature Page     I have received my discharge instructions, and my questions have been answered. I have discussed any challenges I see with this plan with the nurse or doctor.    ..........................................................................................................................................  Patient/Patient Representative Signature      ..........................................................................................................................................  Patient Representative Print Name and Relationship to Patient    ..................................................               ................................................  Date                                            Time    ..........................................................................................................................................  Reviewed by Signature/Title    ...................................................              ..............................................  Date                                                            Time

## 2018-01-10 NOTE — ED PROVIDER NOTES
"  History     Chief Complaint   Patient presents with     Generalized Weakness     The history is provided by the patient and the EMS personnel. The history is limited by the condition of the patient.     Shaun Gotti is a 89 year old male who with a history of Type II Diabetes, Coronary Atherosclerosis, Hypertension, Hyperlipidemia, Thrombocytopenia, Type II Diabetes, Chronic Kidney Disease Stage III, and Weakness, who presents to the ED via EMS complaining of weakness. Patient is a resident at a senior facility and is cared for by a RN and other facility staff. Today, patient appeared increasingly weak and short of breath. The facility staff sent the patient to the ED because of his symptoms. He states that he has been experiencing weakness intermittently for \"a couple years\". He also complains of intermittent dizziness, frequent shortness of breath, a slight cough, and occasional chest pain. He notes a \"small amount of vomiting the other day\" and a bout of diarrhea that lasted about 1 week. Patient complains that he \"feels dehydrated\". He states that he is concerned that he had a stroke a couple weeks ago as well, because his son has been experiencing difficulty understanding him. Patient says that his chronic back pain has continued to be bothersome. Patient denies any fever, nausea, urinary issues, headache, or other associated symptoms. SHx of Pacemaker Placement, Cholecystectomy, and Umbilical Hernia Repair. Patient is currently on Plavix.     Problem List:    Patient Active Problem List    Diagnosis Date Noted     Sepsis (H) 03/27/2013     Priority: High     Problem list name updated by automated process. Provider to review       Acute viral bronchitis 03/25/2013     Priority: High     Pneumonia 03/25/2013     Priority: High     Cough 03/25/2013     Priority: High     Acute respiratory failure with hypoxia (H) 03/25/2013     Priority: High     Diarrhea 01/10/2018     Priority: Medium     SOB (shortness of " breath) 01/10/2018     Priority: Medium     Hypoxia 01/10/2018     Priority: Medium     Elevated brain natriuretic peptide (BNP) level 01/10/2018     Priority: Medium     Pulmonary hypertension 01/10/2018     Priority: Medium     Elevated troponin 01/10/2018     Priority: Medium     Hypovolemia 09/22/2017     Priority: Medium     Fall 09/21/2017     Priority: Medium     Acute kidney injury (H) 09/21/2017     Priority: Medium     Hypernatremia 09/21/2017     Priority: Medium     Coronary artery disease involving native coronary artery - angio in 2006 with multivessel disease managed medically 09/21/2017     Priority: Medium     Weakness 08/17/2016     Priority: Medium     Elevated lactic acid level 08/17/2016     Priority: Medium     CKD (chronic kidney disease) stage 3, GFR 30-59 ml/min 08/17/2016     Priority: Medium     Cardiac pacemaker in situ 08/19/2015     Priority: Medium     CKD (chronic kidney disease) stage 4, 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) 03/25/2013     Priority: Medium     Bronchitis 03/25/2013     Priority: Medium     Diabetes type 2, uncontrolled (H) 03/25/2013     Priority: Medium     Problem list name updated by automated process. Provider to review       CARDIOVASCULAR SCREENING; LDL GOAL LESS THAN 100 10/31/2010     Priority: Medium     Diabetes mellitus, type 2 (H) 04/28/2003     Priority: Medium     Problem list name updated by automated process. Provider to review       Thrombocytopenia (H) 03/25/2013     Priority: Low     Advanced directives, counseling/discussion 02/02/2012     Priority: Low     Advance Directive Problem List Overview:   Name Relationship Phone    Primary Health Care Agent Marilou Gotti  Spouse  [phone]         Alternative Health Care Agent 2. Vika Gotti    3. Corey Gotti    4. Zhanna Blanco  Daughter    Son    Daughter [phone]       Patient states has Advance Directive and will bring in a copy to clinic. Pt wife brought copy into the  "clinic.Cuca [facility]essydu[facility]  2/2/2012 2/7/12  Received outside Health Care Directive. Previously signed by patient and notary on 12/27/2002.  scanned and placed behind media tab on 2/6/12.  Please see Health Care Directive for specifics...Keshia Ortiz RN         Essential hypertension, benign 07/07/2006     Priority: Low     Coronary atherosclerosis 07/07/2006     Priority: Low     Problem list name updated by automated process. Provider to review          Past Medical History:    Past Medical History:   Diagnosis Date     Bronchitis, not specified as acute or chronic 09/26/10     Chronic kidney disease (CKD)      Coronary atherosclerosis of native coronary artery 1/22/2010     Coronary atherosclerosis of unspecified type of vessel, native or graft      Hernia of unspecified site of abdominal cavity without mention of obstruction or gangrene      Other and unspecified hyperlipidemia      Prostatitis, unspecified      Type II or unspecified type diabetes mellitus without mention of complication, not stated as uncontrolled      Unspecified essential hypertension        Past Surgical History:    Past Surgical History:   Procedure Laterality Date     CARDIAC CATHERIZATION  10/23/2006    [facility]     HC REMOVAL GALLBLADDER      Cholecystectomy     HC S&I FLUORO PACEMAKER       HERNIA REPAIR         Family History:    Family History   Problem Relation Age of Onset     CANCER Father      prostate cancer       Social History:  Marital Status:   [2]  Social History   Substance Use Topics     Smoking status: Never Smoker     Smokeless tobacco: Never Used     Alcohol use No        Medications:      No current outpatient prescriptions on file.      Review of Systems  All other ROS reviewed and are negative or non-contributory except as stated in HPI.    Physical Exam   BP: 176/87  Pulse: 83  Heart Rate: 82  Temp: 98.3  F (36.8  C)  Resp: 16  Height: 177.8 cm (5' 10\")  Weight: 72 kg (158 lb 11.7 " oz)  SpO2: (!) 88 %      Physical Exam   Constitutional: He appears well-developed and well-nourished.   Very pleasant older male lying in the bed.  Hard of hearing.   HENT:   Head: Normocephalic and atraumatic.   Nose: Nose normal.   Mouth/Throat: Mucous membranes are dry.   Bilateral hearing aids in place.    Eyes: Conjunctivae and EOM are normal. Pupils are equal, round, and reactive to light. Right eye exhibits no discharge. Left eye exhibits discharge.   Neck: Normal range of motion. Neck supple.   Cardiovascular: Normal rate, regular rhythm, normal heart sounds and intact distal pulses.    No murmur heard.  Pacemaker in place.    Pulmonary/Chest: Effort normal and breath sounds normal. No respiratory distress. He has no wheezes. He has no rales. He exhibits no tenderness.   Abdominal: Soft. Bowel sounds are normal. He exhibits no distension. There is no tenderness. There is no rebound and no guarding.   Midline abdominal scar.    Musculoskeletal: He exhibits no edema.   Lymphadenopathy:     He has no cervical adenopathy.   Neurological: He is alert. No sensory deficit. He exhibits normal muscle tone.   Possible mild left sided facial droop.   Slight weakness of left lower extremity.    Skin: Skin is warm and dry. No rash noted. He is not diaphoretic. No pallor.   Psychiatric: He has a normal mood and affect. His behavior is normal.   Nursing note and vitals reviewed.      ED Course (with Medical Decision Making)    Shaun is a 89 year old male who presents to the ED via EMS complaining of increasing weakness and shortness of breath.     His blood pressure is elevated at 176/87 mmHg and his SpO2 is decreased on room air at 88%, so the patient was started on O2 via nasal cannula at 2 lpm with improvement to 95%.     Patient  may have underlying anemia, infection, dehydration, electrolyte abnormality, glucose abnormality, other cause.  Plan IV, labs, EKG, chest x-ray.  I am going to get a head CT also.   Urine.    White count is normal.  Slightly low hemoglobin.  It BUN/creatinine is mildly elevated from previous.  Small increase in troponin.  BNP very high.  No previous seen.  EKG with no acute changes.  Chest x-ray shows findings concerning for mild CHF.    Patient initially receiving fluids but these were turned off.  He is also noted to have a d-dimer elevation, but his creatinine is very high so I cannot do a CT scan.  I spoke with the hospitalist and we reviewed [facility] medical [facility].  No recent echocardiogram seen.  This was done in the ED.    Echocardiogram shows normal ejection fraction.    The results were discussed with the patient and his daughter.  With his weakness, lower oxygen saturations, possible mild dehydration I am going to have him admitted for further management.  I will continue his fluids.  He may need short rehab stint.  He is stable.       Procedures         EKG Interpretation:      Interpreted by Rita Salter  Time reviewed: 1338  Symptoms at time of EKG: Shortness of breath  Rhythm: normal sinus   Rate: Normal  Axis: Left Axis Deviation  Ectopy: none  Conduction: left anterior fasciclar block  ST Segments/ T Waves: T wave inversion precordial  Q Waves: none  Comparison to prior: Unchanged from 9/17    Clinical Impression: no acute changes and non-specific EKG      Results for orders placed or performed during the hospital encounter of 01/10/18 (from the past 24 hour(s))   CBC with platelets differential   Result Value Ref Range    WBC 6.9 4.0 - 11.0 10e9/L    RBC Count 3.86 (L) 4.4 - 5.9 10e12/L    Hemoglobin 11.6 (L) 13.3 - 17.7 g/dL    Hematocrit 36.6 (L) 40.0 - 53.0 %    MCV 95 78 - 100 fl    MCH 30.1 26.5 - 33.0 pg    MCHC 31.7 31.5 - 36.5 g/dL    RDW 13.4 10.0 - 15.0 %    Platelet Count 156 150 - 450 10e9/L    Diff Method Automated Method     % Neutrophils 77.7 %    % Lymphocytes 8.6 %    % Monocytes 11.4 %    % Eosinophils 1.5 %    % Basophils 0.4 %    %  Immature Granulocytes 0.4 %    Absolute Neutrophil 5.3 1.6 - 8.3 10e9/L    Absolute Lymphocytes 0.6 (L) 0.8 - 5.3 10e9/L    Absolute Monocytes 0.8 0.0 - 1.3 10e9/L    Absolute Eosinophils 0.1 0.0 - 0.7 10e9/L    Absolute Basophils 0.0 0.0 - 0.2 10e9/L    Abs Immature Granulocytes 0.0 0 - 0.4 10e9/L   D dimer quantitative   Result Value Ref Range    D Dimer 2.9 (H) 0.0 - 0.50 ug/ml FEU   Comprehensive metabolic panel   Result Value Ref Range    Sodium 142 133 - 144 mmol/L    Potassium 3.9 3.4 - 5.3 mmol/L    Chloride 108 94 - 109 mmol/L    Carbon Dioxide 27 20 - 32 mmol/L    Anion Gap 7 3 - 14 mmol/L    Glucose 100 (H) 70 - 99 mg/dL    Urea Nitrogen 40 (H) 7 - 30 mg/dL    Creatinine 2.50 (H) 0.66 - 1.25 mg/dL    GFR Estimate 24 (L) >60 mL/min/1.7m2    GFR Estimate If Black 29 (L) >60 mL/min/1.7m2    Calcium 9.1 8.5 - 10.1 mg/dL    Bilirubin Total 0.8 0.2 - 1.3 mg/dL    Albumin 3.3 (L) 3.4 - 5.0 g/dL    Protein Total 7.6 6.8 - 8.8 g/dL    Alkaline Phosphatase 95 40 - 150 U/L    ALT 22 0 - 70 U/L    AST 21 0 - 45 U/L   Magnesium   Result Value Ref Range    Magnesium 2.1 1.6 - 2.3 mg/dL   Phosphorus   Result Value Ref Range    Phosphorus 2.8 2.5 - 4.5 mg/dL   Lactic acid whole blood   Result Value Ref Range    Lactic Acid 0.9 0.7 - 2.0 mmol/L   Troponin I   Result Value Ref Range    Troponin I ES 0.183 (HH) 0.000 - 0.045 ug/L   Nt probnp inpatient (BNP)   Result Value Ref Range    N-Terminal Pro BNP Inpatient 24467 (H) 0 - 1800 pg/mL   Blood gas venous   Result Value Ref Range    Ph Venous 7.42 7.32 - 7.43 pH    PCO2 Venous 38 (L) 40 - 50 mm Hg    PO2 Venous 28 25 - 47 mm Hg    Bicarbonate Venous 25 21 - 28 mmol/L    Base Excess Venous 0.3 mmol/L    FIO2 28    XR Chest 2 Views    Narrative    CHEST TWO VIEWS   1/10/2018 12:51 PM     HISTORY: Shortness of breath.    COMPARISON: Chest x-rays dated 9/21/2017 and 3/26/2013.    FINDINGS:  Dual channel pacemaker and leads are stable in appearance.  There is mild vascular  congestion, new or increased since the most  recent prior study. Cardiac silhouette is within normal limits for  size. No pneumothorax is identified. There are small bilateral pleural  fluid collections. No fracture is seen. There is diffuse osteopenia.      Impression    IMPRESSION:  1. Findings concerning for mild congestive heart failure given the  pleural fluid collections and mild vascular congestion. No definite  cardiomegaly, however, is seen.  2. Otherwise stable chest x-rays.  3. I recommend appropriate clinical treatment and followup chest  x-rays after resolution of current symptoms to ensure resolution of  the chest x-ray findings.    FELICITY DOOLEY MD   Head CT w/o contrast    Narrative    CT SCAN OF THE HEAD WITHOUT CONTRAST   1/10/2018 2:09 PM     HISTORY: Possible stroke-like symptoms.    TECHNIQUE: 4 mm thick axial images of the head without IV contrast  material. Radiation dose for this scan was reduced using automated  exposure control, adjustment of the mA and/or kV according to patient  size, or iterative reconstruction technique.    COMPARISON: 1/22/2010    FINDINGS:  There is generalized atrophy of the brain.  Areas of low  attenuation are present in the white matter of the cerebral  hemispheres that are consistent with small vessel ischemic disease in  this age patient.  There is no evidence of intracranial hemorrhage,  mass, acute infarct or anomaly. The visualized portions of the sinuses  and mastoids appear normal. Bilateral ocular lens replacements are  noted. There is no evidence of trauma.       Impression    IMPRESSION:  1. Worsening of age-related atrophy and small vessel white matter  ischemic changes.  2. No acute intracranial hemorrhage.    I discussed these findings with Dr. Salter on 1/10/2018 at  approximately 2:20 PM.    FELICITY DOOLEY MD   UA with Microscopic reflex to Culture   Result Value Ref Range    Color Urine Yellow     Appearance Urine Clear     Glucose Urine Negative  NEG^Negative mg/dL    Bilirubin Urine Negative NEG^Negative    Ketones Urine 5 (A) NEG^Negative mg/dL    Specific Gravity Urine 1.009 1.003 - 1.035    Blood Urine Small (A) NEG^Negative    pH Urine 5.0 5.0 - 7.0 pH    Protein Albumin Urine 100 (A) NEG^Negative mg/dL    Urobilinogen mg/dL 0.0 0.0 - 2.0 mg/dL    Nitrite Urine Negative NEG^Negative    Leukocyte Esterase Urine Negative NEG^Negative    Source Midstream Urine     WBC Urine <1 0 - 2 /HPF    RBC Urine 4 (H) 0 - 2 /HPF    Squamous Epithelial /HPF Urine 1 0 - 1 /HPF    Mucous Urine Present (A) NEG^Negative /LPF    Hyaline Casts 1 0 - 2 /LPF   ECHO COMPLETE WITH OPTISON    Narrative    [id number]  [id number]  [id number]  [id number]^BEAU^CARLITOS^YASMINE           [facility]  Echocardiography Laboratory  [address] [place] [address]        Name: JANELLE DENISE  MRN: [id number]  : 1925  Study Date: 01/10/2018 03:11 PM  Age: 89 yrs  Gender: Male  Patient Location: [facility]  Reason For Study: CHF  Ordering Physician: CARLITOS YANEZ  Referring Physician: Sony Berkowitz  Performed By: Panchito Lopez     BSA: 1.9 m2  Height: 70 in  Weight: 159 lb  HR: 83  BP: 168/79 mmHg  _____________________________________________________________________________  __        Procedure  Complete Portable Echo Adult. Contrast Optison.  _____________________________________________________________________________  __        Interpretation Summary     Left ventricular systolic function is normal.  The visual ejection fraction is estimated at 55-60%.  The left ventricle is normal in size.  There is mild concentric left ventricular hypertrophy.  The right ventricle is normal in structure, function and size.  There is a pacemaker lead in the right ventricle.  Right ventricular systolic pressure is elevated, consistent with moderate  pulmonary hypertension.     No old studies available for  comparison  _____________________________________________________________________________  __        Left Ventricle  The left ventricle is normal in size. There is mild concentric left  ventricular hypertrophy. Left ventricular systolic function is normal. The  visual ejection fraction is estimated at 55-60%. Left ventricular diastolic  function is indeterminate. Septal wall motion abnormality may reflect  pacemaker activation. There is no thrombus seen in the left ventricle.     Right Ventricle  The right ventricle is normal in structure, function and size. There is a  pacemaker lead in the right ventricle.     Atria  Normal left atrial size. Pacer wire in right atrium. There is no atrial shunt  seen.     Mitral Valve  There is mild to moderate mitral annular calcification. There is no mitral  regurgitation noted. There is no mitral valve stenosis.        Tricuspid Valve  Normal tricuspid valve. The right ventricular systolic pressure is elevated at  48.3 mmHg. Right ventricular systolic pressure is elevated, consistent with  moderate pulmonary hypertension. There is no tricuspid stenosis.     Aortic Valve  The aortic valve is trileaflet. No aortic regurgitation is present. No aortic  stenosis is present.     Pulmonic Valve  The pulmonic valve is not well seen, but is grossly normal. There is no  pulmonic valvular regurgitation. There is no pulmonic valvular stenosis.     Vessels  The aortic root is normal size. Normal size ascending aorta. Inferior vena  cava not well visualized for estimation of right atrial pressure.     Pericardium  The pericardium appears normal. There is no pleural effusion.        Rhythm  The rhythm was paced.  _____________________________________________________________________________  __  MMode/2D Measurements & Calculations  IVSd: 1.2 cm     LVIDd: 3.8 cm  LVIDs: 2.8 cm  LVPWd: 1.2 cm  FS: 26.2 %  EDV(Teich): 60.0 ml  ESV(Teich): 28.7 ml  LV mass(C)d: 154.9 grams  LV mass(C)dI: 81.8  grams/m2  Ao root diam: 2.8 cm  LA dimension: 3.1 cm  asc Aorta Diam: 3.3 cm  LA/Ao: 1.1  LVOT diam: 2.0 cm  LVOT area: 3.1 cm2  RWT: 0.65        Doppler Measurements & Calculations  MV E max luli: 91.7 cm/sec  MV A max luli: 116.7 cm/sec  MV E/A: 0.79  MV dec time: 0.20 sec  TR max luli: 347.5 cm/sec  TR max P.3 mmHg  E/E' av.2  Lateral E/e': 13.3  Medial E/e': 15.1              _____________________________________________________________________________  __        Report approved by: Dr. Favian Nuñez 01/10/2018 03:50 PM      Troponin I   Result Value Ref Range    Troponin I ES 0.154 (HH) 0.000 - 0.045 ug/L       Medications   sodium chloride (PF) 0.9% PF flush 3 mL (3 mLs Intracatheter Given 1/10/18 1328)   naloxone (NARCAN) injection 0.1-0.4 mg (not administered)   sodium chloride (PF) 0.9% PF flush 3 mL (not administered)   sodium chloride (PF) 0.9% PF flush 3 mL (3 mLs Intracatheter Given 1/10/18 1912)   acetaminophen (TYLENOL) tablet 650 mg (not administered)   ondansetron (ZOFRAN-ODT) ODT tab 4 mg (not administered)     Or   ondansetron (ZOFRAN) injection 4 mg (not administered)   0.9% sodium chloride infusion ( Intravenous Rate/Dose Change 1/10/18 2116)   amLODIPine (NORVASC) tablet 10 mg (not administered)   aspirin EC EC tablet 81 mg (not administered)   clopidogrel (PLAVIX) tablet 75 mg (not administered)   hypromellose-dextran (ARTIFICAL TEARS) ophthalmic solution 1 drop (1 drop Both Eyes Given 1/10/18 2124)   insulin glargine (LANTUS) injection 8 Units (not administered)   metoprolol (TOPROL-XL) 24 hr tablet 50 mg (not administered)   nitroGLYcerin (NITROSTAT) sublingual tablet 0.4 mg (not administered)   omeprazole (priLOSEC) CR capsule 20 mg (not administered)   glucose 40 % gel 15-30 g (not administered)     Or   dextrose 50 % injection 25-50 mL (not administered)     Or   glucagon injection 1 mg (not administered)   0.9% sodium chloride BOLUS (0 mLs Intravenous Stopped 1/10/18 1320)    furosemide (LASIX) injection 20 mg (20 mg Intravenous Given 1/10/18 1328)   perflutren diluted 1mL to 2mL with saline (OPTISON) diluted injection 9 mL (3 mLs Intravenous Given 1/10/18 1531)   sodium chloride (PF) 0.9% PF flush 3 mL (3 mLs Intracatheter Given 1/10/18 1530)       Assessments & Plan   I have reviewed the findings, diagnosis, plan with the patient.    Current Discharge Medication List          Final diagnoses:   Weakness   Confusion   Elevated brain natriuretic peptide (BNP) level       Disposition: Patient admitted observation status in stable condition.      This document serves as a record of services personally performed by Rita Salter MD. It was created on their behalf by Kristie Tuttle, a trained medical scribe. The creation of this record is based on the provider's personal observations and the statements of the patient. This document has been checked and approved by the attending provider.    Note: Chart documentation done in part with Dragon Voice Recognition software. Although reviewed after completion, some word and grammatical errors may remain.      1/10/2018   [facility] EMERGENCY DEPARTMENT     Rita Salter MD  01/10/18 6944

## 2018-01-10 NOTE — ED NOTES
Assist patient to bedside commode, changed pad, repositioned for comfort.  Urine obtained but no stool, just gas.  RN notified.  Patient back in bed in POC and on monitor.

## 2018-01-10 NOTE — IP AVS SNAPSHOT
MRN:[id number]                      After Visit Summary   1/10/2018    Shaun Gotti    MRN: [id number]           Thank you!     Thank you for choosing [facility] for your care. Our goal is always to provide you with excellent care. Hearing back from our patients is one way we can continue to improve our services. Please take a few minutes to complete the written survey that you may receive in the mail after you visit with us. Thank you!        Patient Information     Date Of Birth          5/23/1925        Designated Caregiver       Most Recent Value    Caregiver    Will someone help with your care after discharge? yes    Name of designated caregiver Panchito    Phone number of caregiver same    Caregiver address same      About your hospital stay     You were admitted on:  January 10, 2018 You last received care in the:  [address]    You were discharged on:  January 12, 2018        Reason for your hospital stay       Worsening weakness - during this hospital stay you had en episode of increased confusion, difficulty with speech and severe weakness, however no cause could be found even when looked for.  Thankfully you have appeared to completely recover from that and are looking wonderful today.  [facility]                  Who to Call     For medical emergencies, please call 911.  For non-urgent questions about your medical care, please call your primary care provider or clinic, [phone]          Attending Provider     Provider Specialty    Rita Salter MD Emergency Medicine    Chika De La Vega MD Family Practice       Primary Care Provider Office Phone # Fax #    Sony Berkowitz [phone]      After Care Instructions     Activity - Up with nursing assistance           Advance Diet as Tolerated       Follow this diet upon discharge:       Dysphagia Diet Level 2  Mech Altered Nectar Thickened Liquids (pre-thickened or use instant food thickener)            General info for SNF       Length of Stay Estimate: Short Term Care: Estimated # of Days <30  Condition at Discharge: Improving  Level of care:skilled   Rehabilitation Potential: Good  Admission H&P remains valid and up-to-date: Yes  Recent Chemotherapy: N/A  Use Nursing Home Standing Orders: Yes            Glucose monitor nursing POCT       Before meals and at bedtime            Mantoux instructions       Give two-step Mantoux (PPD) Per Facility Policy Yes            Oxygen - Nasal cannula       1-2 Lpm by nasal cannula to keep O2 sats 90% or greater.                  Follow-up Appointments     Follow Up and recommended labs and tests       Follow up with rehab or primary care physician within 1 week                  Additional Services     Occupational Therapy Adult Consult       Evaluate and treat as clinically indicated.    Reason:  Generalized muscle weakness, episode of severe weakness associated with dysarthria and dysphagia of unclear etiology but improving            Physical Therapy Adult Consult       Evaluate and treat as clinically indicated.    Reason:  Generalized muscle weakness, episode of severe weakness associated with dysarthria and dysphagia of unclear etiology but improving            Speech Language Path Adult Consult       Evaluate and treat as clinically indicated.    Reason:  Episode of dysphagia and dysarthria of unclear etiology                  Pending Results     Date and Time Order Name Status Description    1/11/2018 1346 Methicillin resistant staph aureus cult In process             Statement of Approval     Ordered          01/12/18 1057  I have reviewed and agree with all the recommendations and orders detailed in this document.  EFFECTIVE NOW     Approved and electronically signed by:  Chika De La Vega MD             Admission Information     Date & Time Provider Department  "Dept. Phone    1/10/2018 Chika De La Vega MD [address] [facility] [phone]      Your Vitals Were     Blood Pressure Pulse Temperature Respirations Height Weight    150/76 100 99.4  F (37.4  C) (Oral) 20 1.778 m (5' 10\") 71.2 kg (156 lb 15.5 oz)    Pulse Oximetry BMI (Body Mass Index)                89% 22.52 kg/m2          [employer] Information     [employer] lets you send messages to your doctor, view your test results, renew your prescriptions, schedule appointments and more. To sign up, go to [email or web address] . Click on \"Log in\" on the left side of the screen, which will take you to the Welcome page. Then click on \"Sign up Now\" on the right side of the page.     You will be asked to enter the access code listed below, as well as some personal information. Please follow the directions to create your username and password.     Your access code is: [email or web address]  Expires: 2018 11:01 AM     Your access code will  in 90 days. If you need help or a new code, please call your [facility] clinic or [phone].        Care EveryWhere ID     This is your Care EveryWhere ID. This could be used by other organizations to access your [facility] medical records  [id number]        Equal Access to Services     JOHN RAGSDALE AH: Hadii celine bianchi Soaleisha, waaxda luqadaha, qaybta kaalmada callie inman . So [facility] [phone].    ATENCIÓN: Si habla español, tiene a castano disposición servicios gratuitos de asistencia lingüística. Llame al [phone].    We comply with applicable federal civil rights laws and [place] laws. We do not discriminate on the basis of race, color, national origin, age, disability, sex, sexual orientation, or gender identity.               Review of your medicines      START taking        Dose / Directions    insulin glargine 100 UNIT/ML injection   Commonly known as:  LANTUS   Replaces:  LANTUS VIAL 100 UNIT/ML injection     "    Dose:  8 Units   Inject 8 Units Subcutaneous every morning   Refills:  0       order for DME        Equipment being ordered: Oxygen - patient needs 1L [place] oxygen continuously during transportation to rehab facility.   Quantity:  1 Device   Refills:  0         CONTINUE these medicines which may have CHANGED, or have new prescriptions. If we are uncertain of the size of tablets/capsules you have at home, strength may be listed as something that might have changed.        Dose / Directions    acetaminophen 325 MG tablet   Commonly known as:  TYLENOL   This may have changed:    - medication strength  - how much to take  - how to take this  - when to take this  - reasons to take this  - additional instructions   Used for:  Pain        Dose:  650 mg   Take 2 tablets (650 mg) by mouth every 4 hours as needed for mild pain   Quantity:  100 tablet   Refills:  0       amLODIPine 10 MG tablet   Commonly known as:  NORVASC   This may have changed:  See the new instructions.   Used for:  Essential hypertension, benign        Dose:  10 mg   Take 1 tablet (10 mg) by mouth daily   Quantity:  30 tablet   Refills:  0       aspirin 81 MG tablet   This may have changed:  See the new instructions.   Used for:  Atherosclerosis of coronary artery of native heart, angina presence unspecified, unspecified vessel or lesion type        Dose:  81 mg   Take 1 tablet (81 mg) by mouth daily   Quantity:  30 tablet   Refills:  0       atorvastatin 20 MG tablet   Commonly known as:  LIPITOR   This may have changed:  medication strength   Used for:  Atherosclerosis of coronary artery of native heart, angina presence unspecified, unspecified vessel or lesion type        Dose:  20 mg   Take 1 tablet (20 mg) by mouth daily   Quantity:  30 tablet   Refills:  0       clopidogrel 75 MG tablet   Commonly known as:  PLAVIX   This may have changed:  See the new instructions.   Used for:  Atherosclerosis of coronary artery of native heart, angina presence  unspecified, unspecified vessel or lesion type        1 TABLET DAILY   Quantity:  30 tablet   Refills:  0       finasteride 5 MG tablet   Commonly known as:  PROSCAR   This may have changed:  See the new instructions.   Used for:  Benign prostatic hyperplasia, unspecified whether lower urinary tract symptoms present        Dose:  1 tablet   Take 1 tablet (5 mg) by mouth daily   Quantity:  30 tablet   Refills:  0       metoprolol succinate 50 MG 24 hr tablet   Commonly known as:  TOPROL-XL   This may have changed:  medication strength   Used for:  Atherosclerosis of coronary artery of native heart, angina presence unspecified, unspecified vessel or lesion type        Dose:  50 mg   Take 1 tablet (50 mg) by mouth daily   Quantity:  30 tablet   Refills:  0       nitroGLYcerin 0.4 MG sublingual tablet   Commonly known as:  NITROSTAT   This may have changed:  See the new instructions.   Used for:  Atherosclerosis of coronary artery of native heart, angina presence unspecified, unspecified vessel or lesion type        1 TAB EVERY 5 MIN AS NEEDED FOR CHEST PAIN, UP TO 3 PER EPISODE   Quantity:  25 tablet   Refills:  0       omeprazole 20 MG CR capsule   Commonly known as:  priLOSEC   This may have changed:  See the new instructions.   Used for:  Gastroesophageal reflux disease without esophagitis        1 CAPSULE DAILY   Quantity:  30 capsule   Refills:  0       tamsulosin 0.4 MG capsule   Commonly known as:  FLOMAX   This may have changed:  See the new instructions.   Used for:  Benign prostatic hyperplasia, unspecified whether lower urinary tract symptoms present        Dose:  0.4 mg   Take 1 capsule (0.4 mg) by mouth At Bedtime   Quantity:  60 capsule   Refills:  0         CONTINUE these medicines which have NOT CHANGED        Dose / Directions    hypromellose 0.3 % Soln ophthalmic solution   Commonly known as:  GENTEAL        Dose:  1 drop   1 drop 4 times daily   Refills:  0       * order for DME   Used for:  Low back  pain, Fall, initial encounter        Equipment being ordered: 4 wheel walker with brakes and seat   Quantity:  1 Device   Refills:  0       * order for DME   Used for:  Sepsis(995.91), Falls frequently        Equipment being ordered: walker   Quantity:  1 Device   Refills:  0       * order for DME   Used for:  Sepsis(995.91), Falls frequently        Equipment being ordered: transfer bridge   Quantity:  1 Device   Refills:  0       PRESERVISION AREDS 2 PO        Dose:  2 tablet   Take 2 tablets by mouth daily One in AM; one in PM   Refills:  0       RENAL MULTIVITAMIN FORMULA PO        Dose:  1 capsule   Take 1 capsule by mouth every morning   Refills:  0       sertraline 25 MG tablet   Commonly known as:  ZOLOFT   Used for:  Major depression in complete remission (H)        Dose:  25 mg   Take 1 tablet (25 mg) by mouth daily   Quantity:  30 tablet   Refills:  0       * Notice:  This list has 3 medication(s) that are the same as other medications prescribed for you. Read the directions carefully, and ask your doctor or other care provider to review them with you.      STOP taking     LANTUS VIAL 100 UNIT/ML injection   Generic drug:  insulin glargine   Replaced by:  insulin glargine 100 UNIT/ML injection                Where to get your medicines      Some of these will need a paper prescription and others can be bought over the counter. Ask your nurse if you have questions.     Bring a paper prescription for each of these medications     order for DME       You don't need a prescription for these medications     acetaminophen 325 MG tablet    amLODIPine 10 MG tablet    aspirin 81 MG tablet    atorvastatin 20 MG tablet    clopidogrel 75 MG tablet    finasteride 5 MG tablet    insulin glargine 100 UNIT/ML injection    metoprolol succinate 50 MG 24 hr tablet    nitroGLYcerin 0.4 MG sublingual tablet    omeprazole 20 MG CR capsule    sertraline 25 MG tablet    tamsulosin 0.4 MG capsule                Protect others  around you: Learn how to safely use, store and throw away your medicines at www.disposemymeds.org.             Medication List: This is a list of all your medications and when to take them. Check marks below indicate your daily home schedule. Keep this list as a reference.      Medications           Morning Afternoon Evening Bedtime As Needed    acetaminophen 325 MG tablet   Commonly known as:  TYLENOL   Take 2 tablets (650 mg) by mouth every 4 hours as needed for mild pain   Last time this was given:  650 mg on 1/12/2018  5:43 AM                                amLODIPine 10 MG tablet   Commonly known as:  NORVASC   Take 1 tablet (10 mg) by mouth daily   Last time this was given:  10 mg on 1/12/2018  9:35 AM                                aspirin 81 MG tablet   Take 1 tablet (81 mg) by mouth daily                                atorvastatin 20 MG tablet   Commonly known as:  LIPITOR   Take 1 tablet (20 mg) by mouth daily                                clopidogrel 75 MG tablet   Commonly known as:  PLAVIX   1 TABLET DAILY   Last time this was given:  75 mg on 1/12/2018  9:35 AM                                finasteride 5 MG tablet   Commonly known as:  PROSCAR   Take 1 tablet (5 mg) by mouth daily                                hypromellose 0.3 % Soln ophthalmic solution   Commonly known as:  GENTEAL   1 drop 4 times daily                                insulin glargine 100 UNIT/ML injection   Commonly known as:  LANTUS   Inject 8 Units Subcutaneous every morning                                metoprolol succinate 50 MG 24 hr tablet   Commonly known as:  TOPROL-XL   Take 1 tablet (50 mg) by mouth daily   Last time this was given:  50 mg on 1/12/2018  9:35 AM                                nitroGLYcerin 0.4 MG sublingual tablet   Commonly known as:  NITROSTAT   1 TAB EVERY 5 MIN AS NEEDED FOR CHEST PAIN, UP TO 3 PER EPISODE                                omeprazole 20 MG CR capsule   Commonly known as:  priLOSEC   1  CAPSULE DAILY   Last time this was given:  20 mg on 1/12/2018  9:35 AM                                * order for DME   Equipment being ordered: 4 wheel walker with brakes and seat                                * order for DME   Equipment being ordered: walker                                * order for DME   Equipment being ordered: transfer bridge                                order for DME   Equipment being ordered: Oxygen - patient needs 1L [place] oxygen continuously during transportation to rehab facility.                                PRESERVISION AREDS 2 PO   Take 2 tablets by mouth daily One in AM; one in PM                                RENAL MULTIVITAMIN FORMULA PO   Take 1 capsule by mouth every morning                                sertraline 25 MG tablet   Commonly known as:  ZOLOFT   Take 1 tablet (25 mg) by mouth daily                                tamsulosin 0.4 MG capsule   Commonly known as:  FLOMAX   Take 1 capsule (0.4 mg) by mouth At Bedtime                                * Notice:  This list has 3 medication(s) that are the same as other medications prescribed for you. Read the directions carefully, and ask your doctor or other care provider to review them with you.

## 2018-01-10 NOTE — IP AVS SNAPSHOT
` `     [address] SURGICAL: [phone]                 INTERAGENCY TRANSFER FORM - NOTES (H&P, Discharge Summary, Consults, Procedures, Therapies)   1/10/2018                    Hospital Admission Date: 1/10/2018  JANELLE DENISE   : 1925  Sex: Male        Patient PCP Information     Provider PCP Type    Sony Berkowitz MD General         History & Physicals      H&P by Peter Vann MD at 1/10/2018  8:35 PM     Author:  Peter Vann MD Service:  Hospitalist Author Type:  Physician    Filed:  1/10/2018  8:43 PM Date of Service:  1/10/2018  8:35 PM Creation Time:  1/10/2018  8:35 PM    Status:  Signed :  Peter Vann MD (Physician)         [facility]    History and Physical  Hospitalist       Date of Admission:  1/10/2018  Date of Service (when I saw the patient): 01/10/18    Assessment & Plan       Active Problems:    Weakness    Assessment: suspected to be secondary to his age, comorbid medical problems and diarrhea.  He has had poor oral intake due to poor appetite.  He denies any CP but has had some SOB and has known CAD that is being managed medically which could also be contributing to his SOB[DF1.1] although his EKG is not showing any acute ischemic changes[DF1.2].  His troponin is elevated but coming down and suspect he may have some demand ischemia from having diarrhea but doubt primary cardiac ischemia as his primary problem.   His BNP is elevated but clinically is not showing signs of CHF.    Plan: register to observation, gentle IV fluids, PT evaluation and will likely need short term rehab      Diarrhea    Assessment: is now starting to slow down.  Suspect he may have had a viral enteritis    Plan: no acute intervention but monitor       SOB (shortness of breath)    Assessment: mild and on further questioning weakness explains his symptoms better than SOB    Plan: no acute intervention      Hypoxia     Assessment: mild.  He has pulmonary HTN on his echo which could be contributing. No evidence of pneumonia or clinical suspicion for PE.  BNP is elevated but no edema or rales on his lung exam to suggest CHF.    Plan: oxygen and monitor pulse ox for now      Elevated brain natriuretic peptide (BNP) level    Assessment: as above.  Echo done today with good EF and no description of diastolic CHF    Plan: as above.      Pulmonary hypertension    Assessment: noted     Plan: as above      Elevated troponin    Assessment: as above    Plan: as above.  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    Peter Vann MD    Primary Care Physician   Sony Berkowitz    Chief Complaint   Weak    History is obtained from the patient    History of Present Illness   Shaun Gotti is a 89 year old male who presents with weakness.  He has had diarrhea for the past 4 days.  Today he has only had two episodes which is much improved from how much he has had in the previous 3 days.  He denies vomiting but has a poor appetite and has been eating poorly.  He admits to mild SOB but denies CP.  His SOB as he describes it is more a complaint of weakness with activity and inability to move due to weakness.      Past Medical History    I have reviewed this patient's medical history and updated it with pertinent information if needed.   Past Medical History:   Diagnosis  Date     Bronchitis, not specified as acute or chronic 09/26/10    Admitted. Lower respiratory infection, severe weakness.     Chronic kidney disease (CKD)      Coronary atherosclerosis of native coronary artery 2010    Admitted. Syncopal spell and substernal chest pain.     Coronary atherosclerosis of unspecified type of vessel, native or graft      Hernia of unspecified site of abdominal cavity without mention of obstruction or gangrene     left inguinal hernia     Other and unspecified hyperlipidemia      Prostatitis, unspecified     Chronic prostatitis     Type II or unspecified type diabetes mellitus without mention of complication, not stated as uncontrolled      Unspecified essential hypertension        Past Surgical History   I have reviewed this patient's surgical history and updated it with pertinent information if needed.  Past Surgical History:   Procedure Laterality Date     CARDIAC CATHERIZATION  10/23/2006    [facility]     HC REMOVAL GALLBLADDER      Cholecystectomy     HC S&I FLUORO PACEMAKER       HERNIA REPAIR         Prior to Admission Medications   Prior to Admission Medications   Prescriptions Last Dose Informant Patient Reported? Taking?   AMLODIPINE BESYLATE 10 MG OR TABS 1/10/2018 at 0800  Yes Yes   Si TABLET DAILY   ASPIRIN 81 MG OR TABS 1/10/2018 at 0800  Yes Yes   Si TABLET DAILY   ATORVASTATIN CALCIUM PO 1/10/2018 at 0800  Yes Yes   Sig: Take 20 mg by mouth daily   B Complex-C-Folic Acid (RENAL MULTIVITAMIN FORMULA PO) 1/10/2018 at 0800  Yes Yes   Sig: Take 1 capsule by mouth every morning   CLOPIDOGREL BISULFATE 75 MG OR TABS 1/10/2018 at 0800  No Yes   Si TABLET DAILY   FINASTERIDE 5 MG OR TABS 1/10/2018 at 0800  No Yes   Si TABLET DAILY   METOPROLOL SUCCINATE PO 1/10/2018 at 0800  Yes Yes   Sig: Take 50 mg by mouth daily.   Multiple Vitamins-Minerals (PRESERVISION AREDS 2 PO) 1/10/2018 at 0800  Yes Yes   Sig: Take 2 tablets by mouth daily One in AM; one in  PM   NITROGLYCERIN 0.4 MG SL SUBL   Yes No   Si TAB EVERY 5 MIN AS NEEDED FOR CHEST PAIN, UP TO 3 PER EPISODE   OMEPRAZOLE 20 MG OR CPDR 1/10/2018 at 0800  Yes Yes   Si CAPSULE DAILY   ORDER FOR DME   No No   Sig: Equipment being ordered: 4 wheel walker with brakes and seat   ORDER FOR DME   No No   Sig: Equipment being ordered: walker   ORDER FOR DME   No No   Sig: Equipment being ordered: transfer bridge   TAMSULOSIN HCL CAPS 0.4 MG OR 2018 at 2100  Yes Yes   Si CAPSULE DAILY   acetaminophen (TYLENOL) 500 MG tablet 2018 at 2100  Yes Yes   Si TABLET THREE TIMES A DAY. DO NOT EXCEED 4000MG OF ACETAMINOPHEN IN A 24 HOUR PERIOD.   hypromellose (GENTEAL) 0.3 % SOLN 2018 at 2100  Yes Yes   Si drop 4 times daily   insulin glargine (LANTUS VIAL) 100 UNIT/ML injection 2018 at 1100  Yes Yes   Sig: Inject 20 Units Subcutaneous every morning    sertraline (ZOLOFT) 25 MG tablet 1/10/2018 at 0800  Yes Yes   Sig: Take 1 tablet (25 mg) by mouth daily      Facility-Administered Medications: None     Allergies   Allergies   Allergen Reactions     Fluoxetine      Lisinopril        Social History   I have reviewed this patient's social history and updated it with pertinent information if needed. Shaun Gotti  reports that he has never smoked. He has never used smokeless tobacco. He reports that he does not drink alcohol or use illicit drugs.    Family History   I have reviewed this patient's family history and updated it with pertinent information if needed.   Family History   Problem Relation Age of Onset     CANCER Father      prostate cancer       Review of Systems   The 10 point Review of Systems is negative other than noted in the HPI or here.     Physical Exam   Temp: 98.4  F (36.9  C) Temp src: Oral BP: 171/79 Pulse: 86 Heart Rate: 81 Resp: 20 SpO2: 95 % O2 Device: Nasal cannula Oxygen Delivery: 2 LPM  Vital Signs with Ranges  Temp:  [98.3  F (36.8  C)-98.4  F (36.9  C)] 98.4  F (36.9   C)  Pulse:  [83-90] 86  Heart Rate:  [75-86] 81  Resp:  [11-21] 20  BP: ()/(69-92) 171/79  SpO2:  [87 %-97 %] 95 %  156 lbs 15.48 oz    Constitutional:   awake, alert, cooperative, no apparent distress, and appears stated age     Eyes:   Lids and lashes normal, pupils equal, round and reactive to light, extra ocular muscles intact, sclera clear, conjunctiva normal     ENT:   Normocephalic, without obvious abnormality, atraumatic, sinuses nontender on palpation, external ears without lesions, oral pharynx with moist mucous membranes, tonsils without erythema or exudates, gums normal and good dentition.     Neck:   Supple, symmetrical, trachea midline, no adenopathy, thyroid symmetric, not enlarged and no tenderness, skin normal     Hematologic / Lymphatic:   no cervical lymphadenopathy and no supraclavicular lymphadenopathy     Back:   Symmetric, no curvature, spinous processes are non-tender on palpation, paraspinous muscles are non-tender on palpation, no costal vertebral tenderness     Lungs:   No increased work of breathing, good air exchange, clear to auscultation bilaterally, no crackles or wheezing     Cardiovascular:   Normal apical impulse, regular rate and rhythm, normal S1 and S2, no S3 or S4, and no murmur noted     Abdomen:   normal bowel sounds, soft, non-distended, non-tender, no masses palpated, no hepatosplenomegally     Neurologic:   Awake, alert, oriented to name, place and time.  Cranial nerves II-XII are grossly intact.  Motor is 5 out of 5 bilaterally.   Sensory is intact.         Data   Data reviewed today:  I personally reviewed[DF1.1] the EKG tracing showing no acute ischemic changes[DF1.2].    Recent Labs  Lab 01/10/18  1620 01/10/18  1225   WBC  --  6.9   HGB  --  11.6*   MCV  --  95   PLT  --  156   NA  --  142   POTASSIUM  --  3.9   CHLORIDE  --  108   CO2  --  27   BUN  --  40*   CR  --  2.50*   ANIONGAP  --  7   RAHEL  --  9.1   GLC  --  100*   ALBUMIN  --  3.3*   PROTTOTAL  --   7.6   BILITOTAL  --  0.8   ALKPHOS  --  95   ALT  --  22   AST  --  21   TROPI 0.154* 0.183*       Recent Results (from the past 24 hour(s))   XR Chest 2 Views    Narrative    CHEST TWO VIEWS   1/10/2018 12:51 PM     HISTORY: Shortness of breath.    COMPARISON: Chest x-rays dated 9/21/2017 and 3/26/2013.    FINDINGS:  Dual channel pacemaker and leads are stable in appearance.  There is mild vascular congestion, new or increased since the most  recent prior study. Cardiac silhouette is within normal limits for  size. No pneumothorax is identified. There are small bilateral pleural  fluid collections. No fracture is seen. There is diffuse osteopenia.      Impression    IMPRESSION:  1. Findings concerning for mild congestive heart failure given the  pleural fluid collections and mild vascular congestion. No definite  cardiomegaly, however, is seen.  2. Otherwise stable chest x-rays.  3. I recommend appropriate clinical treatment and followup chest  x-rays after resolution of current symptoms to ensure resolution of  the chest x-ray findings.    FELICITY DOOLEY MD   Head CT w/o contrast    Narrative    CT SCAN OF THE HEAD WITHOUT CONTRAST   1/10/2018 2:09 PM     HISTORY: Possible stroke-like symptoms.    TECHNIQUE: 4 mm thick axial images of the head without IV contrast  material. Radiation dose for this scan was reduced using automated  exposure control, adjustment of the mA and/or kV according to patient  size, or iterative reconstruction technique.    COMPARISON: 1/22/2010    FINDINGS:  There is generalized atrophy of the brain.  Areas of low  attenuation are present in the white matter of the cerebral  hemispheres that are consistent with small vessel ischemic disease in  this age patient.  There is no evidence of intracranial hemorrhage,  mass, acute infarct or anomaly. The visualized portions of the sinuses  and mastoids appear normal. Bilateral ocular lens replacements are  noted. There is no evidence of trauma.        Impression    IMPRESSION:  1. Worsening of age-related atrophy and small vessel white matter  ischemic changes.  2. No acute intracranial hemorrhage.    I discussed these findings with Dr. Salter on 1/10/2018 at  approximately 2:20 PM.    FELICITY DOOLEY MD[DF1.1]          Revision History        User Key Date/Time User Provider Type Action    > DF1.2 1/10/2018  8:43 PM Peter Vann MD Physician Sign     DF1.1 1/10/2018  8:35 PM Petre Vann MD Physician                   Discharge Summaries     No notes of this type exist for this encounter.         Consult Notes      Consults by Silvia Cervantes at 1/11/2018  3:31 PM     Author:  Silvia Cervantes Service:  Social Work Author Type:      Filed:  1/11/2018  3:32 PM Date of Service:  1/11/2018  3:31 PM Creation Time:  1/11/2018  3:18 PM    Status:  Signed :  Silvia Cervantes ()     Consult Orders:    1. Care Transition RN/SW IP Consult [[id number]] ordered by Chika De La Vega MD at 01/11/18 1517                CARE TRANSITION SOCIAL WORK INITIAL ASSESSMENT:  Reason For Consult: discharge planning   Met with: Patient and Family.    DATA[SF1.1]  Principal Problem:    Diarrhea  Active Problems:    Diabetes mellitus, type 2 (H)    CKD (chronic kidney disease) stage 4, 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)    Weakness    Coronary artery disease involving native coronary artery - angio in 2006 with multivessel disease managed medically    SOB (shortness of breath)    Hypoxia    Elevated brain natriuretic peptide (BNP) level    Pulmonary hypertension    Elevated troponin[SF1.2]       Primary Care Clinic Name: [facility]   Primary Care MD Name: Dr. Sony Berkowitz   Contact information and PCP information verified: Yes      ASSESSMENT  Cognitive Status: awake and alert.             Lives With: alone  Living Arrangements: apartment  Quality Of Family Relationships: supportive, helpful, involved  Description of Support System:  Supportive, Involved   Who is your support system?: Children, Other (specify) (home care staff)   Support Assessment: Lacks adequate physical care   Insurance Concerns: No Insurance issues identified        This writer met with patient in his room.  Writer introduced self and role. Patient was resting.  Patient is very hard of hearing, so used the pocket talker.  Patient stated that he does have a home care nurse that comes to see him. Patient lives alone in a Senior apartment, not assisted living.     Writer spoke with son, Panchito, over the phone.  He stated that he has been going to patient's home daily from 8-noon, assisting with meal prep, cooking, med set up and shopping.  Son stated that he visits patient all day on the weekends. Son confirmed that patient has a home care nurse from [facility] that sees patient every other week and and aide that comes 1x per week for bathing and personal cares.  Discussed discharge planning and medicare guidelines in regards to home care and SNF benefits. Son stated that patient was in short term rehab at the [facility] about 6 months ago. Son requesting referral there.  Referral was sent.     Writer spoke with admissions at the [facility] rehab.  They will assess this patient for possible placement [facility]  [facility]     Care Transitions will continue to follow for d/c planning.        PLAN    Looking into short term rehab placement.[SF1.1]      Discharge Planner[SF1.2]   Discharge Plans in progress: yes   Barriers to discharge plan: none at this time  Follow up plan: CTS to follow.        Entered by:[SF1.1] TRISTAN BRIGHT 01/11/2018 3:19 PM[SF1.2]              Revision History        User Key Date/Time User Provider Type Action    > SF1.2 1/11/2018  3:32 PM Tristan Bright  Sign     SF1.1 1/11/2018  3:18 PM Tristan Bright                       Progress Notes - Physician (Notes from 01/09/18  through 01/12/18)      Progress Notes by Silvia Cervantes at 1/12/2018  8:09 AM     Author:  Silvia Cervantes Service:  Social Work Author Type:      Filed:  1/12/2018  8:12 AM Date of Service:  1/12/2018  8:09 AM Creation Time:  1/12/2018  8:09 AM    Status:  Signed :  Silvia Cervantes ()         Admissions at the [facility] have called and stated that they have a rehab bed available for this patient today.  They do not accept admissions on the weekend and will not on Monday, as is is a holiday (Dav Das[SF1.1] [SF1.2] day).[SF1.1]      Revision History        User Key Date/Time User Provider Type Action    > SF1.2 1/12/2018  8:12 AM Silvia Cervantes  Sign     SF1.1 1/12/2018  8:09 AM Silvia Cervantes              Progress Notes by Chika De La Vega MD at 1/11/2018  9:43 AM     Author:  Chika De La Vega MD Service:  Family Medicine Author Type:  Physician    Filed:  1/11/2018  7:21 PM Date of Service:  1/11/2018  9:43 AM Creation Time:  1/11/2018  9:43 AM    Status:  Signed :  Chika De La Vega MD (Physician)         [facility] Progress Note[EP1.1]          Assessment and Plan:   Assessment:[EP1.2]   Principal Problem:    Diarrhea    Assessment:[EP1.3]   initially what brought patient into the emergency room, however patient has had no significant diarrhea since this hospitalization.  He does continue to have minimal oral intake and there is increased difficulty with swallowing noted.  He also has new dysarthria and increased confusion as noted no further treatment is needed.[EP1.4]    Plan:[EP1.3]  will transition patient inpatient status and proceed with workup as noted below.  Will continue to monitor for diarrhea, however at this time[EP1.4]       Decreased oral intake    Assessment: Patient has had minimal oral intake overnight despite encouragement.  Now is having increased confusion and more difficulty in  swallowing.      Plan:[EP1.3] Will admit patient and continue with IV fluids, proceed with swallow evaluation.  Did have prolonged conversation with patient's 2 children regarding the worsening symptoms seen today with new dysarthria in the setting of ongoing suggestive dysphasia did discuss MRI, however patient would be intolerant of the exam, and when discussing.  And goals and outcomes for the patient, it is unclear that that would  plan going forward.  We will continue to encourage oral intake and monitor patient's mental status and neurological function closely going forward.  A care conference is set up for tomorrow to discuss how the patient has gone.  If patient continues to decline, may need to consider transitioning to comfort care at that time, and the 2 children present today are aware[EP1.4]      Dysphagia    Assessment:[EP1.3] noted slightly at home, however significant  per nursing staff[EP1.4]    Plan:[EP1.3]   proceed with swallow evaluation and any recommendation regarding his diet going forward[EP1.4]      Dysarthria    Assessment:[EP1.3] Significant, not present at time of admission.  Patient has remained vitally stable without any evidence of infection.  Patient is more confused today and is unable to reliably follow instructions, however given the worsening, there is concern for possible stroke.  Patient did have a CT scan of the head that was unremarkable yesterday[EP1.4]     Plan:[EP1.3]  did discuss performing an MRI with patient's children today, however patient would be intolerant of the exam .  On further discussion, patient's health has been declining over time and family has been aware he would not be able to live independently for much longer even if his health remained as it was a week ago.  They are not sure how aggressive the patient would want to be going forward unless the and prognosis was significantly better than his previous baseline will have care conference  tomorrow with more family present to discuss.  In detail patient's progress over the next 24 hours as well as goals going forward.  Will not perform further imaging at this time but continue to monitor patient closely.[EP1.4]      Confusion    Assessment:[EP1.3]   Not present initially, but very pronounced today.  Per family patient has not had significant confusion, but does have memory impairment and at times seems to have some hallucinations intermittently.  They state that he is much more confused today than he is at baseline But that it is difficult to assess given his dysarthria.[EP1.4]      Plan:[EP1.3] Proceed with plan as above[EP1.4]      Weakness    Assessment:[EP1.3] Significant and worsening[EP1.4]    Plan:[EP1.3]  proceed with plan as above[EP1.4]      SOB (shortness of breath)    Assessment:[EP1.3] Patient denies any shortness of breath currently, but still requires 2 L nasal cannula to maintain saturations[EP1.4]    Plan:[EP1.3] Continue with O2 supplementation.[EP1.4]        Hypoxia    Assessment:[EP1.3] Mild and ongoing without obvious etiology identified on workup performed to date continue with O2 supplementation[EP1.4]    Plan:        Elevated troponin    Assessment:[EP1.3]  trending downward, with no evidence of wall motion abnormality on echocardiogram[EP1.4]     Plan:[EP1.3] continue to trend  and monitor closely for signs or symptoms concerning for ischemia[EP1.4]       Diabetes mellitus, type 2 (H)    Assessment:[EP1.3] Blood sugars have been stable[EP1.4]    Plan:[EP1.3] Continue with current plan and monitor.  If oral intake continues to be very poor, would transition to q. 4 hour checks[EP1.4]      CKD (chronic kidney disease) stage 4, 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)    Assessment:[EP1.3] Chronic and stable[EP1.4]    Plan:[EP1.3] Continue to monitor[EP1.4]      Coronary artery disease involving native coronary artery - angio in 2006 with multivessel disease managed medically    Assessment:[EP1.3]  No evidence of acute angina with echocardiogram showing no acute wall motion abnormality[EP1.4]    Plan:[EP1.3] Continue with current regimen and monitor[EP1.4]      Elevated brain natriuretic peptide (BNP) level    Assessment:[EP1.3] BNP was 21,000, of unclear significance this patient has no evidence of CHF exacerbation[EP1.4]    Plan:[EP1.3] Continue with cares as above[EP1.4]      Pulmonary hyperten[EP1.3]s[EP1.4]ion    Assessment:[EP1.3] Noted on echocardiogram[EP1.4]    Plan:[EP1.3] Continue with O2 support as above      Advance directives, counseling/discussion    Assessment:   Discuss further with family regarding wishes going forward and need to confirm patient should be DNR/DNI    Plan:   We will transition patient to DNR/DNI status.[EP1.4]       VTE:[EP1.1]  SCDs[EP1.4]  Code Status:[EP1.1]  DNR/DNI[EP1.4]        Interval History:[EP1.1]   Appears worse today.  Vitals overall stable with ongoing HTN but patient less alert and much more difficulty to understand when he tries to talk.  Patient is not oriented to place or time and is not able to reorient easily.  Not responding well to interventions and the treatment plan.  New concerns include increasing confusion, worsening dysphagia and new dysarthria this morning compared to last night, ongoing hypoxia, etc[EP1.4]            Significant Problems:[EP1.1]     Past Medical History:   Diagnosis Date     Bronchitis, not specified as acute or chronic 09/26/10    Admitted. Lower respiratory infection, severe weakness.     Chronic kidney disease (CKD)      Coronary atherosclerosis of native coronary artery 1/22/2010    Admitted. Syncopal spell and substernal chest pain.     Coronary atherosclerosis of unspecified type of vessel, native or graft      Hernia of unspecified site of abdominal cavity without mention of obstruction or gangrene     left inguinal hernia     Other and unspecified hyperlipidemia      Prostatitis, unspecified     Chronic prostatitis      "Type II or unspecified type diabetes mellitus without mention of complication, not stated as uncontrolled      Unspecified essential hypertension[EP1.5]             Physical Exam:   Blood pressure 171/70, pulse 100, temperature 98.2  F (36.8  C), resp. rate 20, height 1.778 m (5' 10\"), weight 71.2 kg (156 lb 15.5 oz), SpO2 90 %.[EP1.1]  Constitutional:   Awake, alert but very hard to understand when he attempts to talk with disorientation to place and time, no acute distress     Lungs:   No increased work of breathing, decreased air exchange but difficult to get patient to take deep breaths, clear to auscultation bilaterally, no crackles or wheezing     Cardiovascular:   Normal apical impulse, regular rate and rhythm, normal S1 and S2, no S3 or S4, and no murmur noted     Abdomen:   Bowel sounds present, abdomen soft without apparent tenderness     Musculoskeletal:   no lower extremity pitting edema present     Neurologic:   Awake and alert with obvious dysarthria and very difficult to understand - a notable difference from yesterday.  Patient having difficulty following commands so very challenging to perform further neurological examination but no obvious facial droop and patient is able to lift arms and legs against gravity     Skin:   normal skin color, texture, turgor[EP1.4]           Data:[EP1.1]   All laboratory data reviewed[EP1.4]    Attestation:  I have reviewed today's vital signs, notes, medications, labs and imaging.[EP1.1]     More than 40 minutes was spent[EP1.6] on evaluation of the patient and discussion with family regarding the options going forward.[EP1.4]      Electronically Signed:  Chika De La Vega MD    Note: Chart documentation done in part with Dragon Voice Recognition software. Although reviewed after completion, some word and grammatical errors may remain.[EP1.1]          Revision History        User Key Date/Time User Provider Type Action    > EP1.5 1/11/2018  7:21 PM Ceferino, " Chika Marcum MD Physician Sign     EP1.4 1/11/2018  6:33 PM Chika De La Vega MD Physician      EP1.3 1/11/2018  6:31 PM Chika De La Vega MD Physician      EP1.2 1/11/2018  6:28 PM Chika De La Vega MD Physician      EP1.6 1/11/2018 11:31 AM Chika De La Vega MD Physician      EP1.1 1/11/2018  9:43 AM Chika De La Vega MD Physician             Progress Notes by Rahul Cervantes at 1/11/2018  2:59 PM     Author:  Rahul Cervantes Service:  Spiritual Health Author Type:      Filed:  1/11/2018  3:00 PM Date of Service:  1/11/2018  2:59 PM Creation Time:  1/11/2018  2:59 PM    Status:  Signed :  Rahul Cervantes ()         SPIRITUAL HEALTH SERVICES  SPIRITUAL ASSESSMENT Progress Note  [facility]      Pt request - Pt indicated he was making progress and welcomed a prayer, which  provided.   is available for pt/family needs.    Rahul Cervantes M.Div., [facility]  Staff   Office tel: [phone][JV1.1]       Revision History        User Key Date/Time User Provider Type Action    > JV1.1 1/11/2018  3:00 PM Rahul Cervantes  Sign            Progress Notes by Debi Esqueda RN at 1/11/2018  1:52 PM     Author:  Debi Esqueda RN Service:  (none) Author Type:  Registered Nurse    Filed:  1/11/2018  1:55 PM Date of Service:  1/11/2018  1:52 PM Creation Time:  1/11/2018  1:52 PM    Status:  Signed :  Debi Esqueda RN (Registered Nurse)         S-(situation): Patient changed to inpatient status    B-(background): Patient status change due to observation goals not being met. Goals not being met    A-(assessment): Please review system assessment and VS.  Denies pain, very weak and need help eating.    R-(recommendations):  Cont new POC as ordered. Will monitor patient per MD orders. Physician review with family      Inpatient nursing criteria listed below were met:    Adult Profile  completedYes  Health care directives status obtained and documented: Yes  Core Measures assessed (Stroke/TIA, Acute MI, Pneumonia and SSI):     VTE prophylaxis orders: yes  (FYI: Asprin is not an approved anticoagulation for DVT prophylaxis)  SCD's Documented: Yes  Vaccine assessment done and vaccines ordered if appropriate.[BK1.1] Yes[BK1.2]  MRSA swab completed for patient 55 years and older (exclude TAMIKO and TKA):[BK1.1] NA[BK1.2]  My Chart patient sign up addressed and documented:[BK1.1] No[BK1.2]  Care Plan initiated:[BK1.1] Yes[BK1.2]  Discharge planning review completed (admission navigator)[BK1.1] Yes[BK1.2]     Revision History        User Key Date/Time User Provider Type Action    > BK1.2 1/11/2018  1:55 PM Debi Esqueda, RN Registered Nurse Sign     BK1.1 1/11/2018  1:52 PM Debi Esqueda RN Registered Nurse             Progress Notes by Dia Leong PT at 1/11/2018 12:50 PM     Author:  Dia Leong PT Service:  (none) Author Type:  Physical Therapist    Filed:  1/11/2018 12:51 PM Date of Service:  1/11/2018 12:50 PM Creation Time:  1/11/2018 12:50 PM    Status:  Signed :  Dia Leong PT (Physical Therapist)          01/11/18 0800   Quick Adds   Type of Visit Initial PT Evaluation   Living Environment   Lives With facility resident   Home Accessibility tub/shower is not walk in   Number of Stairs to Enter Home 0   Number of Stairs Within Home 0   Transportation Available family or friend will provide   Living Environment Comment [employer]. Son visits daily for several hours each day; helping with medications, shopping, meal preparation each day. 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)   Self-Care   Usual Activity Tolerance fair   Current Activity Tolerance poor   Regular Exercise yes   Activity/Exercise Type walking   Exercise Amount/Frequency daily   Equipment Currently Used at Home walker, rolling   Activity/Exercise/Self-Care Comment Slower decline in activity this past year, very weak  "this week   Functional Level Prior   Ambulation 1-->assistive equipment   Transferring 1-->assistive equipment   Toileting 0-->independent   Bathing 3-->assistive equipment and person   Dressing 0-->independent   Eating 0-->independent   Communication (confusion)   Swallowing 2-->difficulty swallowing liquids   Cognition 1 - attention or memory deficits   Fall history within last six months yes   Number of times patient has fallen within last six months 2   Which of the above functional risks had a recent onset or change? ambulation;swallowing;cognition;communication/speech   Prior Functional Level Comment lives alone at [place]. Shower weekly with shower chair used.  Has a few falls , tipping over backwards   General Information   Onset of Illness/Injury or Date of Surgery - Date 01/10/18   Referring Physician Dr De La Vega   Patient/Family Goals Statement improve strength   Pertinent History of Current Problem (include personal factors and/or comorbidities that impact the POC) Per H&P: \"Shaun Gotti is a 89 year old male who with a history of Type II Diabetes, Coronary Atherosclerosis, Hypertension, Hyperlipidemia, Thrombocytopenia, Type II Diabetes, Chronic Kidney Disease Stage III, and Weakness, who presents to the ED via EMS complaining of weakness. Patient is a resident at a [facility] and is cared for by a RN and other facility staff. Today, patient appeared increasingly weak and short of breath. The facility staff sent the patient to the ED because of his symptoms. He states that he has been experiencing weakness intermittently for \"a couple years\". He also complains of intermittent dizziness, frequent shortness of breath, a slight cough, and occasional chest pain. He notes a \"small amount of vomiting the other day\" and a bout of diarrhea that lasted about 1 week. Patient complains that he \"feels dehydrated\". He states that he is concerned that he had a stroke a couple weeks ago " "as well, because his son has been experiencing difficulty understanding him. Patient says that his chronic back pain has continued to be bothersome. Patient denies any fever, nausea, urinary issues, headache, or other associated symptoms. SHx of Pacemaker Placement, Cholecystectomy, and Umbilical Hernia Repair. Patient is currently on Plavix\"   Precautions/Limitations fall precautions   Weight-Bearing Status - LUE weight-bearing as tolerated   Weight-Bearing Status - RUE weight-bearing as tolerated   Weight-Bearing Status - LLE weight-bearing as tolerated   Weight-Bearing Status - RLE weight-bearing as tolerated   General Info Comments Family confirms PLOF. Pocket talker used to help but patient not able to give much information. O2 at 2L, vitally stable at rest   Cognitive Status Examination   Level of Consciousness lethargic/somnolent   Follows Commands and Answers Questions 50% of the time   Pain Assessment   Patient Currently in Pain No  (low back gives him some trouble at times)   Range of Motion (ROM)   ROM Comment Unable to complete steps for AROM. PROM is WFL and was ambulatory up until these worsening status this week. Was feeding himself some this morning so observed spontaneous use of UEs but does not follow commands well for MMT/AROM.    Strength   Strength Comments Unable to formally assess strength. See functional mobility for observations   Bed Mobility   Bed Mobility Comments HOB elevated, moves to EOB partially with max A. Unable to scoot self to support sitting at EOB fully. Mod A sitting balance partially to sitting. Then wants to lay down. Max A for sit to supine and max A x 2 to scoot up in bed.    Transfer Skills   Transfer Comments Unable    Gait   Gait Comments UNable   Balance   Balance Comments Poor sitting balance, and unable standing   Sensory Examination   Sensory Perception Comments Not formally tested   General Therapy Interventions   Intervention Comments Progressive activity, TE , " "transfers and gait as able   Clinical Impression   Criteria for Skilled Therapeutic Intervention yes, treatment indicated   PT Diagnosis generalized weakness   Influenced by the following impairments Clinical exam   Functional limitations due to impairments All mobility, self cares   Clinical Presentation Evolving/Changing   Clinical Presentation Rationale Baseline mobility good, advanced age   Clinical Decision Making (Complexity) Moderate complexity   Therapy Frequency` daily   Predicted Duration of Therapy Intervention (days/wks) 3 days   Anticipated Discharge Disposition Transitional Care Facility   Risk & Benefits of therapy have been explained Yes   Patient, Family & other staff in agreement with plan of care Yes   Clinical Impression Comments Patient quite weak today, worse so than yesterday. Will attempt to assist in mobility progression as he remains in our care   [facility] TM \"6 Clicks\"   2016, Trustees of [facility], under license to [employer].  All rights reserved.   6 Clicks Short Forms Basic Mobility Inpatient Short Form   [facility]  \"6 Clicks\" V.2 Basic Mobility Inpatient Short Form   1. Turning from your back to your side while in a flat bed without using bedrails? 2 - A Lot   2. Moving from lying on your back to sitting on the side of a flat bed without using bedrails? 2 - A Lot   3. Moving to and from a bed to a chair (including a wheelchair)? 1 - Total   4. Standing up from a chair using your arms (e.g., wheelchair, or bedside chair)? 1 - Total   5. To walk in hospital room? 1 - Total   6. Climbing 3-5 steps with a railing? 1 - Total   Basic Mobility Raw Score (Score out of 24.Lower scores equate to lower levels of function) 8   Total Evaluation Time   Total Evaluation Time (Minutes) 20[AN1.1]        Revision History        User Key Date/Time User Provider Type Action    > AN1.1 1/11/2018 12:51 PM Dia Leong, PT Physical Therapist Sign            Progress " Notes by Nirmala Villarreal SLP at 1/11/2018 11:57 AM     Author:  Nirmala Villarreal SLP Service:  (none) Author Type:  Speech Language Pathologist    Filed:  1/11/2018 12:00 PM Date of Service:  1/11/2018 11:57 AM Creation Time:  1/11/2018 11:57 AM    Status:  Signed :  Nirmala Villarreal SLP (Speech Language Pathologist)            01/11/18 1000   General Information   Onset Date 01/10/18   Referring Physician Dr. De La Vega   Swallowing Evaluation Bedside swallow evaluation   Behaviorial Observations Lethargic;Confused;Other (Comment)  (Patient has severe hearing loss and is legally blind.)   Mode of current nutrition Oral diet   Type of oral diet Regular;Thin liquid;Other (Comment)  (Patient's son reports coughing on thin liquids at home)   Respiratory Status O2 Supply   Type of O2 supply Nasal cannula   Comments Patient is a 89 year old male who was admitted for weakness. Patient referred for a clinical swallow study due to coughing on liquids. Patient is legally blind and has a severe hearing loss. Patient lives alone, son comes over daily to check on father. Patient's son and daughter were present during today's evaluation. Patient's son reports Shaun does well with food, but coughs on liquid a couple of times a week. Patient has not pneumonia in the past 6 months.   Clinical Swallow Evaluation   Dentition other (see comments);upper dentures;lower dentures  (Dentures not in for today's evaluation d/t confusion)   Mucosal Quality good   Oral Labial Strength and Mobility other (see comments)  (slowed)   Lingual Strength and Mobility other (see comments)  (slowed, weak)   Clinical Swallow Eval: Thin Liquid Texture Trial   Mode of Presentation, Thin Liquids cup;fed by clinician   Volume of Liquid or Food Presented sip   Oral Phase of Swallow other (see comments)  (Pt coughed immediately, spit liquid out)   Pharyngeal Phase of Swallow coughing/choking   Clinical Swallow Eval: Nectar Thick Liquid Texture Trial   Mode  of Presentation, Nectar spoon;cup;straw;fed by clinician   Volume of Nectar Presented 8 sips   Oral Phase, Nectar other (see comments);Poor AP movement  (slowed oral transit)   Pharyngeal Phase, Nectar intact   Clinical Swallow Eval: Puree Solid Texture Trial   Mode of Presentation, Puree spoon;fed by clinician   Volume of Puree Presented 4 tsp   Oral Phase, Puree Poor AP movement;other (see comments)  (slowed)   Pharyngeal Phase, Puree intact   Successful Strategies Trialed During Procedure, Puree other (see comments)  (small bites)   Swallow Compensations   Swallow Compensations Reduce amounts;Pacing   Esophageal Phase of Swallow   Patient reports or presents with symptoms of esophageal dysphagia No   Swallow Eval: Clinical Impressions   Skilled Criteria for Therapy Intervention Other (see comments)  (Modify diet at this time, SLP to monitor status and advance diet as patient tolerates )   Functional Assessment Scale (FAS) 3   Dysphagia Outcome Severity Scale (ANTON) Level 3 - ANTON   Treatment Diagnosis Moderate Oropharyngeal Dysphagia   Diet texture recommendations Nectar thick liquids;Dysphagia diet level 2   Recommended Feeding/Eating Techniques alternate between small bites and sips of food/liquid;maintain upright posture during/after eating for 30 mins;small sips/bites   Demonstrates Need for Referral to Another Service physical therapy   Therapy Frequency other (see comments)   Risks and Benefits of Treatment have been explained. Yes   Patient, family and/or staff in agreement with Plan of Care Yes   Clinical Impression Comments Due to confusion and weakness, patient was fed by SLP throughout evaluation. Recommend 1.) Co-feeder during mealtines, 2.) Nectar thick liquids 3.) Mechanical Soft solids 4.) Consider modified barium swallow study was clinical status improves to formally asses pharyngeal phase of the swallow.   Total Evaluation Time   Total Evaluation Time (Minutes) 25     Nirmala Villarreal MA,  CCC-SLP[AJ1.1]     Revision History        User Key Date/Time User Provider Type Action    > AJ1.1 1/11/2018 12:00 PM Nirmala Villarreal SLP Speech Language Pathologist Sign            Progress Notes by Neeru Powers RN at 1/11/2018  7:35 AM     Author:  Neeru Powers RN Service:  (none) Author Type:  Registered Nurse    Filed:  1/11/2018  7:40 AM Date of Service:  1/11/2018  7:35 AM Creation Time:  1/11/2018  7:35 AM    Status:  Signed :  Neeru Powers RN (Registered Nurse)         S-(situation): end of shift note    B-(background): weakness    A-(assessment): pt is oriented to self only. Is cooperative and follows commands, pleasant but needs much redirection. Was too weak to stand at edge of bed with assist of two and gait belt with walker. Incontinent of urine x1 and voided x1. Using 2L nc with O2 sats mid 90s. Lungs are clear.     R-(recommendations): redirect and reorient as needed. Monitor vs, labs, I and O. PT eval, swallow eval.[SD1.1]        Revision History        User Key Date/Time User Provider Type Action    > SD1.1 1/11/2018  7:40 AM Neeru Powers RN Registered Nurse Sign            ED Provider Notes by Rita Salter MD at 1/10/2018 11:45 AM     Author:  Rita Salter MD Service:  Emergency Medicine Author Type:  Physician    Filed:  1/10/2018 11:48 PM Date of Service:  1/10/2018 11:45 AM Creation Time:  1/10/2018 12:03 PM    Status:  Signed :  Rita Salter MD (Physician)           History[AF1.1]     Chief Complaint   Patient presents with     Generalized Weakness[RE1.1]     The history is provided by the patient and the EMS personnel. The history is limited by the condition of the patient.     Shaun Gotti is a 89 year old male who[AF1.1] with a history of Type II Diabetes, Coronary Atherosclerosis, Hypertension, Hyperlipidemia, Thrombocytopenia, Type II Diabetes, Chronic Kidney Disease Stage III, and Weakness, who presents to the ED via EMS  "complaining of weakness.[AF1.2] Patient is a resident at a senior facility and is cared for by a RN and other facility staff. Today, patient appeared increasingly weak and short of breath. The facility staff sent the patient to the ED because of his symptoms. He states that he has been experiencing weakness intermittently for \"a couple years\". He also complains of intermittent dizziness, frequent shortness of breath, a slight cough, and occasional chest pain. He notes a \"small amount of vomiting the other day\" and a bout of diarrhea that lasted about 1 week. Patient complains that he \"feels dehydrated\". He states that he is concerned that he had a stroke a couple weeks ago as well, because his son has been experiencing difficulty understanding him. Patient says that his chronic back pain has continued to be bothersome. Patient denies any fever, nausea, urinary issues, headache, or other associated symptoms. SHx of Pacemaker Placement, Cholecystectomy, and Umbilical Hernia Repair. Patient is currently on Plavix.[AF1.3]     Problem List:[AF1.1]    Patient Active Problem List    Diagnosis Date Noted     Sepsis (H) 03/27/2013     Priority: High     Problem list name updated by automated process. Provider to review       Acute viral bronchitis 03/25/2013     Priority: High     Pneumonia 03/25/2013     Priority: High     Cough 03/25/2013     Priority: High     Acute respiratory failure with hypoxia (H) 03/25/2013     Priority: High     Diarrhea 01/10/2018     Priority: Medium     SOB (shortness of breath) 01/10/2018     Priority: Medium     Hypoxia 01/10/2018     Priority: Medium     Elevated brain natriuretic peptide (BNP) level 01/10/2018     Priority: Medium     Pulmonary hypertension 01/10/2018     Priority: Medium     Elevated troponin 01/10/2018     Priority: Medium     Hypovolemia 09/22/2017     Priority: Medium     Fall 09/21/2017     Priority: Medium     Acute kidney injury (H) 09/21/2017     Priority: Medium     " Hypernatremia 09/21/2017     Priority: Medium     Coronary artery disease involving native coronary artery - angio in 2006 with multivessel disease managed medically 09/21/2017     Priority: Medium     Weakness 08/17/2016     Priority: Medium     Elevated lactic acid level 08/17/2016     Priority: Medium     CKD (chronic kidney disease) stage 3, GFR 30-59 ml/min 08/17/2016     Priority: Medium     Cardiac pacemaker in situ 08/19/2015     Priority: Medium     CKD (chronic kidney disease) stage 4, 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) 03/25/2013     Priority: Medium     Bronchitis 03/25/2013     Priority: Medium     Diabetes type 2, uncontrolled (H) 03/25/2013     Priority: Medium     Problem list name updated by automated process. Provider to review       CARDIOVASCULAR SCREENING; LDL GOAL LESS THAN 100 10/31/2010     Priority: Medium     Diabetes mellitus, type 2 (H) 04/28/2003     Priority: Medium     Problem list name updated by automated process. Provider to review       Thrombocytopenia (H) 03/25/2013     Priority: Low     Advanced directives, counseling/discussion 02/02/2012     Priority: Low     Advance Directive Problem List Overview:   Name Relationship Phone    Primary Health Care Agent Marilou Gotti  Spouse  [phone]         Alternative Health Care Agent 2. Vika Gotti    3. Corey Gotti    4. Zhanna Aguilar  Daughter    Son    Daughter [phone]       Patient states has Advance Directive and will bring in a copy to clinic. Pt wife brought copy into the clinic.Cuca Nunez CMA  2/2/2012 2/7/12  Received outside Health Care Directive. Previously signed by patient and notary on 12/27/2002.  scanned and placed behind media tab on 2/6/12.  Please see Health Care Directive for specifics...Keshia Ortiz RN         Essential hypertension, benign 07/07/2006     Priority: Low     Coronary atherosclerosis 07/07/2006     Priority: Low     Problem list name updated by automated process.  "Provider to review[RE1.1]          Past Medical History:[AF1.1]    Past Medical History:   Diagnosis Date     Bronchitis, not specified as acute or chronic 09/26/10     Chronic kidney disease (CKD)      Coronary atherosclerosis of native coronary artery 1/22/2010     Coronary atherosclerosis of unspecified type of vessel, native or graft      Hernia of unspecified site of abdominal cavity without mention of obstruction or gangrene      Other and unspecified hyperlipidemia      Prostatitis, unspecified      Type II or unspecified type diabetes mellitus without mention of complication, not stated as uncontrolled      Unspecified essential hypertension[RE1.1]        Past Surgical History:[AF1.1]    Past Surgical History:   Procedure Laterality Date     C[facility]  [facility]     [facility] REMOVAL GALLBLADDER      Cholecystectomy     HC S&I FLUORO PACEMAKER       HERNIA REPAIR[RE1.1]         Family History:[AF1.1]    Family History   Problem Relation Age of Onset     CANCER Father      prostate cancer[RE1.1]       Social History:  Marital Status:   [2][AF1.1]  Social History   Substance Use Topics     Smoking status: Never Smoker     Smokeless tobacco: Never Used     Alcohol use No[RE1.1]        Medications:[AF1.1]      No current outpatient prescriptions on file.[RE1.1]      Review of Systems[AF1.1]  All other ROS reviewed and are negative or non-contributory except as stated in HPI.[AF1.3]    Physical Exam[AF1.1]   BP: 176/87  Pulse: 83  Heart Rate: 82  Temp: 98.3  F (36.8  C)  Resp: 16  Height: 177.8 cm (5' 10\")  Weight: 72 kg (158 lb 11.7 oz)  SpO2: (!) 88 %[RE1.1]      Physical Exam   Constitutional: He appears[AF1.1] well-developed[RE1.1] and[AF1.1] well-nourished[RE1.1].[AF1.1]   Very pleasant older male lying in the bed.  Hard of hearing.[RE1.1]   HENT:   Head:[AF1.1] Normocephalic[AF1.3] and[AF1.1] atraumatic[AF1.3].   Nose:[AF1.1] Nose normal[AF1.3].   Mouth/Throat: Mucous " membranes are[AF1.1] dry[AF1.3].[AF1.1]   Bilateral hearing aids in place.[AF1.3]    Eyes:[AF1.1] Conjunctivae[AF1.3] and[AF1.1] EOM[AF1.3] are normal.[AF1.1] Pupils are equal, round, and reactive to light[AF1.3]. Right eye exhibits[AF1.1] no discharge[AF1.3]. Left eye exhibits[AF1.1] discharge[AF1.3].   Neck:[AF1.1] Normal range of motion[AF1.3].[AF1.1] Neck supple[AF1.3].   Cardiovascular:[AF1.1] Normal rate[AF1.3],[AF1.1] regular rhythm[AF1.3],[AF1.1] normal heart sounds[AF1.3] and[AF1.1] intact distal pulses[AF1.3].[AF1.1]    No murmur[AF1.3] heard.[AF1.1]  Pacemaker in place.[AF1.3]    Pulmonary/Chest:[AF1.1] Effort normal[AF1.3] and[AF1.1] breath sounds normal[AF1.3]. No[AF1.1] respiratory distress[AF1.3]. He has[AF1.1] no wheezes[AF1.3]. He has[AF1.1] no rales[AF1.3]. He exhibits[AF1.1] no tenderness[AF1.3].   Abdominal:[AF1.1] Soft[AF1.3].[AF1.1] Bowel sounds are normal[AF1.3]. He exhibits[AF1.1] no distension[AF1.3]. There is[AF1.1] no tenderness[AF1.3]. There is[AF1.1] no rebound[AF1.3] and[AF1.1] no guarding[AF1.3].[AF1.1]   Midline abdominal scar.[AF1.3]    Musculoskeletal: He exhibits no[AF1.1] edema[AF1.3].   Lymphadenopathy:     He has[AF1.1] no cervical adenopathy[AF1.3].   Neurological: He is[AF1.1] alert[RE1.1]. No[AF1.1] sensory deficit[AF1.3]. He exhibits[AF1.1] normal muscle tone[RE1.1].[AF1.1]   Possible mild left sided facial droop.   Slight weakness of left lower extremity.[AF1.3]    Skin: Skin is[AF1.1] warm[AF1.3] and[AF1.1] dry[AF1.3].[AF1.1] No rash[AF1.3] noted. He is[AF1.1] not diaphoretic[AF1.3]. No[AF1.1] pallor[AF1.3].   Psychiatric: He has a[AF1.1] normal mood and affect[AF1.3]. His[AF1.1] behavior is normal[AF1.3].[AF1.1]   Nursing note[AF1.3] and[AF1.1] vitals[AF1.3] reviewed.      ED Course (with Medical Decision Making)[AF1.1]    Shaun is a 89 year old male who presents to the ED via EMS complaining of increasing weakness and shortness of breath.     His blood pressure is  elevated at 176/87 mmHg and his SpO2 is decreased on room air at 88%, so the patient was started on O2 via nasal cannula at[AF1.3] 2 lpm with improvement to 95%.[AF1.4]     Patient  may have underlying anemia, infection, dehydration, electrolyte abnormality, glucose abnormality, other cause.  Plan IV, labs, EKG, chest x-ray.  I am going to get a head CT also.  Urine.    White count is normal.  Slightly low hemoglobin.  It BUN/creatinine is mildly elevated from previous.  Small increase in troponin.  BNP very high.  No previous seen.  EKG with no acute changes.  Chest x-ray shows findings concerning for mild CHF.    Patient initially receiving fluids but these were turned off.  He is also noted to have a d-dimer elevation, but his creatinine is very high so I cannot do a CT scan.  I spoke with the hospitalist and we reviewed [facility].  No recent echocardiogram seen.  This was done in the ED.    Echocardiogram shows normal ejection fraction.    The results were discussed with the patient and his daughter.  With his weakness, lower oxygen saturations, possible mild dehydration I am going to have him admitted for further management.  I will continue his fluids.  He may need short rehab stint.  He is stable.[RE1.1]       Procedures[AF1.1]         EKG Interpretation:      Interpreted by Rita Salter  Time reviewed: 1338  Symptoms at time of EKG: Shortness of breath  Rhythm: normal sinus   Rate: Normal  Axis: Left Axis Deviation  Ectopy: none  Conduction: left anterior fasciclar block  ST Segments/ T Waves: T wave inversion precordial  Q Waves: none  Comparison to prior: Unchanged from 9/17    Clinical Impression: no acute changes and non-specific EKG      Results for orders placed or performed during the hospital encounter of 01/10/18 (from the past 24 hour(s))   CBC with platelets differential   Result Value Ref Range    WBC 6.9 4.0 - 11.0 10e9/L    RBC Count 3.86 (L) 4.4 - 5.9  10e12/L    Hemoglobin 11.6 (L) 13.3 - 17.7 g/dL    Hematocrit 36.6 (L) 40.0 - 53.0 %    MCV 95 78 - 100 fl    MCH 30.1 26.5 - 33.0 pg    MCHC 31.7 31.5 - 36.5 g/dL    RDW 13.4 10.0 - 15.0 %    Platelet Count 156 150 - 450 10e9/L    Diff Method Automated Method     % Neutrophils 77.7 %    % Lymphocytes 8.6 %    % Monocytes 11.4 %    % Eosinophils 1.5 %    % Basophils 0.4 %    % Immature Granulocytes 0.4 %    Absolute Neutrophil 5.3 1.6 - 8.3 10e9/L    Absolute Lymphocytes 0.6 (L) 0.8 - 5.3 10e9/L    Absolute Monocytes 0.8 0.0 - 1.3 10e9/L    Absolute Eosinophils 0.1 0.0 - 0.7 10e9/L    Absolute Basophils 0.0 0.0 - 0.2 10e9/L    Abs Immature Granulocytes 0.0 0 - 0.4 10e9/L   D dimer quantitative   Result Value Ref Range    D Dimer 2.9 (H) 0.0 - 0.50 ug/ml FEU   Comprehensive metabolic panel   Result Value Ref Range    Sodium 142 133 - 144 mmol/L    Potassium 3.9 3.4 - 5.3 mmol/L    Chloride 108 94 - 109 mmol/L    Carbon Dioxide 27 20 - 32 mmol/L    Anion Gap 7 3 - 14 mmol/L    Glucose 100 (H) 70 - 99 mg/dL    Urea Nitrogen 40 (H) 7 - 30 mg/dL    Creatinine 2.50 (H) 0.66 - 1.25 mg/dL    GFR Estimate 24 (L) >60 mL/min/1.7m2    GFR Estimate If Black 29 (L) >60 mL/min/1.7m2    Calcium 9.1 8.5 - 10.1 mg/dL    Bilirubin Total 0.8 0.2 - 1.3 mg/dL    Albumin 3.3 (L) 3.4 - 5.0 g/dL    Protein Total 7.6 6.8 - 8.8 g/dL    Alkaline Phosphatase 95 40 - 150 U/L    ALT 22 0 - 70 U/L    AST 21 0 - 45 U/L   Magnesium   Result Value Ref Range    Magnesium 2.1 1.6 - 2.3 mg/dL   Phosphorus   Result Value Ref Range    Phosphorus 2.8 2.5 - 4.5 mg/dL   Lactic acid whole blood   Result Value Ref Range    Lactic Acid 0.9 0.7 - 2.0 mmol/L   Troponin I   Result Value Ref Range    Troponin I ES 0.183 (HH) 0.000 - 0.045 ug/L   Nt probnp inpatient (BNP)   Result Value Ref Range    N-Terminal Pro BNP Inpatient 39889 (H) 0 - 1800 pg/mL   Blood gas venous   Result Value Ref Range    Ph Venous 7.42 7.32 - 7.43 pH    PCO2 Venous 38 (L) 40 - 50 mm Hg     PO2 Venous 28 25 - 47 mm Hg    Bicarbonate Venous 25 21 - 28 mmol/L    Base Excess Venous 0.3 mmol/L    FIO2 28    XR Chest 2 Views    Narrative    CHEST TWO VIEWS   1/10/2018 12:51 PM     HISTORY: Shortness of breath.    COMPARISON: Chest x-rays dated 9/21/2017 and 3/26/2013.    FINDINGS:  Dual channel pacemaker and leads are stable in appearance.  There is mild vascular congestion, new or increased since the most  recent prior study. Cardiac silhouette is within normal limits for  size. No pneumothorax is identified. There are small bilateral pleural  fluid collections. No fracture is seen. There is diffuse osteopenia.      Impression    IMPRESSION:  1. Findings concerning for mild congestive heart failure given the  pleural fluid collections and mild vascular congestion. No definite  cardiomegaly, however, is seen.  2. Otherwise stable chest x-rays.  3. I recommend appropriate clinical treatment and followup chest  x-rays after resolution of current symptoms to ensure resolution of  the chest x-ray findings.    FELICITY DOOLEY MD   Head CT w/o contrast    Narrative    CT SCAN OF THE HEAD WITHOUT CONTRAST   1/10/2018 2:09 PM     HISTORY: Possible stroke-like symptoms.    TECHNIQUE: 4 mm thick axial images of the head without IV contrast  material. Radiation dose for this scan was reduced using automated  exposure control, adjustment of the mA and/or kV according to patient  size, or iterative reconstruction technique.    COMPARISON: 1/22/2010    FINDINGS:  There is generalized atrophy of the brain.  Areas of low  attenuation are present in the white matter of the cerebral  hemispheres that are consistent with small vessel ischemic disease in  this age patient.  There is no evidence of intracranial hemorrhage,  mass, acute infarct or anomaly. The visualized portions of the sinuses  and mastoids appear normal. Bilateral ocular lens replacements are  noted. There is no evidence of trauma.       Impression     IMPRESSION:  1. Worsening of age-related atrophy and small vessel white matter  ischemic changes.  2. No acute intracranial hemorrhage.    I discussed these findings with Dr. Yanez on 1/10/2018 at  approximately 2:20 PM.    FELICITY DOOLEY MD   UA with Microscopic reflex to Culture   Result Value Ref Range    Color Urine Yellow     Appearance Urine Clear     Glucose Urine Negative NEG^Negative mg/dL    Bilirubin Urine Negative NEG^Negative    Ketones Urine 5 (A) NEG^Negative mg/dL    Specific Gravity Urine 1.009 1.003 - 1.035    Blood Urine Small (A) NEG^Negative    pH Urine 5.0 5.0 - 7.0 pH    Protein Albumin Urine 100 (A) NEG^Negative mg/dL    Urobilinogen mg/dL 0.0 0.0 - 2.0 mg/dL    Nitrite Urine Negative NEG^Negative    Leukocyte Esterase Urine Negative NEG^Negative    Source Midstream Urine     WBC Urine <1 0 - 2 /HPF    RBC Urine 4 (H) 0 - 2 /HPF    Squamous Epithelial /HPF Urine 1 0 - 1 /HPF    Mucous Urine Present (A) NEG^Negative /LPF    Hyaline Casts 1 0 - 2 /LPF   ECHO COMPLETE WITH OPTISON    Narrative    [id number]  [id number]  [id number]  [id number]^BEAU^CARLITOS^YASMINE           [facility]  Echocardiography Laboratory  [address] [place] [address]        Name: JANELLE DENISE  MRN: [id number]  : 1925  Study Date: 01/10/2018 03:11 PM  Age: 89 yrs  Gender: Male  Patient Location: [facility]  Reason For Study: CHF  Ordering Physician: CARLITOS YANEZ  Referring Physician: Sony Berkowitz  Performed By: Panchito Lopez     BSA: 1.9 m2  Height: 70 in  Weight: 159 lb  HR: 83  BP: 168/79 mmHg  _____________________________________________________________________________  __        Procedure  Complete Portable Echo Adult. Contrast Optison.  _____________________________________________________________________________  __        Interpretation Summary     Left ventricular systolic function is normal.  The visual ejection fraction is estimated at 55-60%.  The left ventricle is  normal in size.  There is mild concentric left ventricular hypertrophy.  The right ventricle is normal in structure, function and size.  There is a pacemaker lead in the right ventricle.  Right ventricular systolic pressure is elevated, consistent with moderate  pulmonary hypertension.     No old studies available for comparison  _____________________________________________________________________________  __        Left Ventricle  The left ventricle is normal in size. There is mild concentric left  ventricular hypertrophy. Left ventricular systolic function is normal. The  visual ejection fraction is estimated at 55-60%. Left ventricular diastolic  function is indeterminate. Septal wall motion abnormality may reflect  pacemaker activation. There is no thrombus seen in the left ventricle.     Right Ventricle  The right ventricle is normal in structure, function and size. There is a  pacemaker lead in the right ventricle.     Atria  Normal left atrial size. Pacer wire in right atrium. There is no atrial shunt  seen.     Mitral Valve  There is mild to moderate mitral annular calcification. There is no mitral  regurgitation noted. There is no mitral valve stenosis.        Tricuspid Valve  Normal tricuspid valve. The right ventricular systolic pressure is elevated at  48.3 mmHg. Right ventricular systolic pressure is elevated, consistent with  moderate pulmonary hypertension. There is no tricuspid stenosis.     Aortic Valve  The aortic valve is trileaflet. No aortic regurgitation is present. No aortic  stenosis is present.     Pulmonic Valve  The pulmonic valve is not well seen, but is grossly normal. There is no  pulmonic valvular regurgitation. There is no pulmonic valvular stenosis.     Vessels  The aortic root is normal size. Normal size ascending aorta. Inferior vena  cava not well visualized for estimation of right atrial pressure.     Pericardium  The pericardium appears normal. There is no pleural effusion.         Rhythm  The rhythm was paced.  _____________________________________________________________________________  __  MMode/2D Measurements & Calculations  IVSd: 1.2 cm     LVIDd: 3.8 cm  LVIDs: 2.8 cm  LVPWd: 1.2 cm  FS: 26.2 %  EDV(Teich): 60.0 ml  ESV(Teich): 28.7 ml  LV mass(C)d: 154.9 grams  LV mass(C)dI: 81.8 grams/m2  Ao root diam: 2.8 cm  LA dimension: 3.1 cm  asc Aorta Diam: 3.3 cm  LA/Ao: 1.1  LVOT diam: 2.0 cm  LVOT area: 3.1 cm2  RWT: 0.65        Doppler Measurements & Calculations  MV E max luli: 91.7 cm/sec  MV A max luli: 116.7 cm/sec  MV E/A: 0.79  MV dec time: 0.20 sec  TR max luli: 347.5 cm/sec  TR max P.3 mmHg  E/E' av.2  Lateral E/e': 13.3  Medial E/e': 15.1              _____________________________________________________________________________  __        Report approved by: Dr. Favian Nuñez 01/10/2018 03:50 PM      Troponin I   Result Value Ref Range    Troponin I ES 0.154 (HH) 0.000 - 0.045 ug/L       Medications   sodium chloride (PF) 0.9% PF flush 3 mL (3 mLs Intracatheter Given 1/10/18 1328)   naloxone (NARCAN) injection 0.1-0.4 mg (not administered)   sodium chloride (PF) 0.9% PF flush 3 mL (not administered)   sodium chloride (PF) 0.9% PF flush 3 mL (3 mLs Intracatheter Given 1/10/18 1912)   acetaminophen (TYLENOL) tablet 650 mg (not administered)   ondansetron (ZOFRAN-ODT) ODT tab 4 mg (not administered)     Or   ondansetron (ZOFRAN) injection 4 mg (not administered)   0.9% sodium chloride infusion ( Intravenous Rate/Dose Change 1/10/18 2116)   amLODIPine (NORVASC) tablet 10 mg (not administered)   aspirin EC EC tablet 81 mg (not administered)   clopidogrel (PLAVIX) tablet 75 mg (not administered)   hypromellose-dextran (ARTIFICAL TEARS) ophthalmic solution 1 drop (1 drop Both Eyes Given 1/10/18 2124)   insulin glargine (LANTUS) injection 8 Units (not administered)   metoprolol (TOPROL-XL) 24 hr tablet 50 mg (not administered)   nitroGLYcerin (NITROSTAT) sublingual tablet  0.4 mg (not administered)   omeprazole (priLOSEC) CR capsule 20 mg (not administered)   glucose 40 % gel 15-30 g (not administered)     Or   dextrose 50 % injection 25-50 mL (not administered)     Or   glucagon injection 1 mg (not administered)   0.9% sodium chloride BOLUS (0 mLs Intravenous Stopped 1/10/18 1320)   furosemide (LASIX) injection 20 mg (20 mg Intravenous Given 1/10/18 1328)   perflutren diluted 1mL to 2mL with saline (OPTISON) diluted injection 9 mL (3 mLs Intravenous Given 1/10/18 1531)   sodium chloride (PF) 0.9% PF flush 3 mL (3 mLs Intracatheter Given 1/10/18 1530)[RE1.1]       Assessments & Plan   I have reviewed the findings, diagnosis, plan with the patient.[AF1.1]    Current Discharge Medication List          Final diagnoses:   Weakness   Confusion   Elevated brain natriuretic peptide (BNP) level       Disposition: Patient admitted observation status in stable condition.      T[RE1.1]his document serves as a record of services personally performed by Rita Salter[AF1.1] , MD[AF1.4]. It was created on their behalf by Kristie Tuttle, a trained medical scribe. The creation of this record is based on the provider's personal observations and the statements of the patient. This document has been checked and approved by the attending provider.    Note: Chart documentation done in part with Dragon Voice Recognition software. Although reviewed after completion, some word and grammatical errors may remain.      1/10/2018   [facility] EMERGENCY DEPARTMENT[AF1.1]     Rita Salter MD  01/10/18 2348  [RE1.2]     Revision History        User Key Date/Time User Provider Type Action    > RE1.2 1/10/2018 11:48 PM Rita Salter MD Physician Sign     RE1.1 1/10/2018 11:40 PM Rita Salter MD Physician      AF1.4 1/10/2018  1:06 PM Kristie Tuttle     AF1.3 1/10/2018  1:03 PM Kristie Tuttle     AF1.2 1/10/2018 12:17 PM Kristie Tuttle      [N/A] 1/10/2018 12:13 PM Kristie Tuttle Share     AF1.1 1/10/2018 12:10 PM Kristie Tuttle Share            Progress Notes by Peter Vann MD at 1/10/2018 11:06 PM     Author:  Peter Vann MD Service:  Hospitalist Author Type:  Physician    Filed:  1/10/2018 11:06 PM Date of Service:  1/10/2018 11:06 PM Creation Time:  1/10/2018 11:06 PM    Status:  Signed :  Peter Vann MD (Physician)         Family report patient coughing and spitting out drinks at home.  He passed bed side swallow eval here but can have speech therapy evaluate.  electronically signed by Peter Vann M.D.[DF1.1]       Revision History        User Key Date/Time User Provider Type Action    > DF1.1 1/10/2018 11:06 PM Peter Vann MD Physician Sign            Progress Notes by Chika De La Vega RN at 1/10/2018  8:53 PM     Author:  Chika De La Vega RN Service:  (none) Author Type:  Registered Nurse    Filed:  1/10/2018  8:58 PM Date of Service:  1/10/2018  8:53 PM Creation Time:  1/10/2018  8:53 PM    Status:  Signed :  Chika De La Vega RN (Registered Nurse)         S-(situation): Patient registered to Observation. Patient arrived to room 251 via cart from ED    B-(background): weakness    A-(assessment): BP elevated at 171/79, afebrile, O2 sats 87% on room air-93% on 2L/NC. Pt is alert to self only but pleasant and cooperative. No skin issues. Pt is legally blind and [place]. Pt does not have his dentures in. Son will bring dentures and hearing aid tomorrow. Pt passed bed side swallow, but family reports trouble swallowing food and liquids at home. Will recommend a speech swallow study.     R-(recommendations): Orders and observation goals reviewed with pt and daughter      Nursing Observation criteria listed below was met:    Skin issues/needs documented:NA  Isolation needs addressed, if appropriate: NA  Fall Prevention: Education given and documented: Yes  Education  Assessment documented:Yes  Education Documented (Pre-existing chronic infection such as, MRSA/VRE need education on admission): Yes  New medication patient education completed and documented (Possible Side Effects of Common Medications handout): Yes  Home medications if not able to send immediately home with family stored here: NA  Reminder note placed in discharge instructions: NA  Patient has discharge needs (If yes, please explain): Yes[EP1.1]               Revision History        User Key Date/Time User Provider Type Action    > EP1.1 1/10/2018  8:58 PM Chika De La Vega RN Registered Nurse Sign            ED Notes by Laura Vilchis RN at 1/10/2018  6:12 PM     Author:  Laura Vilchis RN Service:  Family Medicine Author Type:  Registered Nurse    Filed:  1/10/2018  6:13 PM Date of Service:  1/10/2018  6:12 PM Creation Time:  1/10/2018  6:13 PM    Status:  Signed :  Laura Vilchis RN (Registered Nurse)         ED Nursing criteria listed below was addressed during verbal handoff:     Abnormal vitals: Yes  Abnormal results: Yes  Med Reconciliation completed: Yes  Meds given in ED: Yes  Any Overdue Meds: N/A  Core Measures: Yes  Isolation: N/A  Special needs: Yes ( fall risk, [place], legally blind, confusion)  Skin assessment: Yes    Observation Patient  Education provided: Yes ( daughter as pt is confused)[MS1.1]     Revision History        User Key Date/Time User Provider Type Action    > MS1.1 1/10/2018  6:13 PM Laura Vilchis RN Registered Nurse Sign            ED Notes by Laura Vilchis RN at 1/10/2018  3:23 PM     Author:  Laura Vilchis RN Service:  Family Medicine Author Type:  Registered Nurse    Filed:  1/10/2018  3:23 PM Date of Service:  1/10/2018  3:23 PM Creation Time:  1/10/2018  3:23 PM    Status:  Signed :  Laura Vilchis RN (Registered Nurse)         Report received from Renetta Neal RN. Having echo done now[MS1.1]     Revision History        User Key  Date/Time User Provider Type Action    > MS1.1 1/10/2018  3:23 PM Laura Vilchis RN Registered Nurse Sign            ED Notes by Cammie Glasgow at 1/10/2018  3:16 PM     Author:  Cammie Glasgow Service:  (none) Author Type:  Technician    Filed:  1/10/2018  3:16 PM Date of Service:  1/10/2018  3:16 PM Creation Time:  1/10/2018  3:16 PM    Status:  Signed :  Cammie Glasgow (Technician)         Patient eating a lunch tray at this time.[JB1.1]     Revision History        User Key Date/Time User Provider Type Action    > JB1.1 1/10/2018  3:16 PM Cammie Glasgow Technician Sign            ED Notes by Renetta Neal RN at 1/10/2018  3:10 PM     Author:  Renetta Neal RN Service:  (none) Author Type:  Registered Nurse    Filed:  1/10/2018  3:12 PM Date of Service:  1/10/2018  3:10 PM Creation Time:  1/10/2018  3:12 PM    Status:  Signed :  Renetta Neal RN (Registered Nurse)         Set up lunch tray.[CK1.1]     Revision History        User Key Date/Time User Provider Type Action    > CK1.1 1/10/2018  3:12 PM Renetta Neal RN Registered Nurse Sign            ED Notes by Cammie Glasgow at 1/10/2018  2:46 PM     Author:  Cammie Glasgow Service:  (none) Author Type:  Technician    Filed:  1/10/2018  2:48 PM Date of Service:  1/10/2018  2:46 PM Creation Time:  1/10/2018  2:46 PM    Status:  Signed :  Cammie Glasgow (Technician)         Assist patient to bedside commode, changed pad, repositioned for comfort.  Urine obtained but no stool, just gas.  RN notified.  Patient back in bed in POC and on monitor.[JB1.1]       Revision History        User Key Date/Time User Provider Type Action    > JB1.1 1/10/2018  2:48 PM Cammie Glasgow Technician Sign            ED Notes by Renetta Neal RN at 1/10/2018  1:50 PM     Author:  Renetta Neal RN Service:  (none) Author Type:  Registered Nurse    Filed:  1/10/2018  1:53 PM Date of Service:  1/10/2018  1:50 PM Creation Time:  1/10/2018   1:53 PM    Status:  Signed :  Renetta Neal RN (Registered Nurse)         Assist of two to get patient to use urinal without any urine output.[CK1.1]     Revision History        User Key Date/Time User Provider Type Action    > CK1.1 1/10/2018  1:53 PM Renetta Neal RN Registered Nurse Sign            ED Notes by Delfina Womack RN at 1/10/2018  1:10 PM     Author:  Delfina Womack RN Service:  (none) Author Type:  Registered Nurse    Filed:  1/10/2018  1:20 PM Date of Service:  1/10/2018  1:10 PM Creation Time:  1/10/2018  1:20 PM    Status:  Signed :  Delfina Womack RN (Registered Nurse)         Elevated troponin 0.183 reported to Dr. Salter[EP1.1]     Revision History        User Key Date/Time User Provider Type Action    > EP1.1 1/10/2018  1:20 PM Delfina Womack RN Registered Nurse Sign            ED Notes by Cammie Glasgow at 1/10/2018 12:26 PM     Author:  Cammie Glasgow Service:  (none) Author Type:  Technician    Filed:  1/10/2018 12:26 PM Date of Service:  1/10/2018 12:26 PM Creation Time:  1/10/2018 12:26 PM    Status:  Signed :  Cammie Glasgow (Technician)         Blood drawn with IV start.[JB1.1]     Revision History        User Key Date/Time User Provider Type Action    > JB1.1 1/10/2018 12:26 PM Cammie Glasgow Technician Sign            ED Notes by Laura Vilchis RN at 1/10/2018 12:02 PM     Author:  Laura Vilchis RN Service:  Family Medicine Author Type:  Registered Nurse    Filed:  1/10/2018 12:03 PM Date of Service:  1/10/2018 12:02 PM Creation Time:  1/10/2018 12:03 PM    Status:  Signed :  Laura Vilchis RN (Registered Nurse)         Sats 88%. O2 started at 2 L NC and now are 96%[MS1.1]     Revision History        User Key Date/Time User Provider Type Action    > MS1.1 1/10/2018 12:03 PM Laura Vilchis, RN Registered Nurse Sign                  Procedure Notes     No notes of this type exist for this encounter.         Progress Notes -  Therapies (Notes from 01/09/18 through 01/12/18)      Progress Notes by Dia Leong PT at 1/11/2018 12:50 PM     Author:  Dia Leong PT Service:  (none) Author Type:  Physical Therapist    Filed:  1/11/2018 12:51 PM Date of Service:  1/11/2018 12:50 PM Creation Time:  1/11/2018 12:50 PM    Status:  Signed :  Dia Leong PT (Physical Therapist)          01/11/18 0800   Quick Adds   Type of Visit Initial PT Evaluation   Living Environment   Lives With facility resident   Home Accessibility tub/shower is not walk in   Number of Stairs to Enter Home 0   Number of Stairs Within Home 0   Transportation Available family or friend will provide   Living Environment Comment [facility]. Son visits daily for several hours each day; helping with medications, shopping, meal preparation each day. 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)   Self-Care   Usual Activity Tolerance fair   Current Activity Tolerance poor   Regular Exercise yes   Activity/Exercise Type walking   Exercise Amount/Frequency daily   Equipment Currently Used at Home walker, rolling   Activity/Exercise/Self-Care Comment Slower decline in activity this past year, very weak this week   Functional Level Prior   Ambulation 1-->assistive equipment   Transferring 1-->assistive equipment   Toileting 0-->independent   Bathing 3-->assistive equipment and person   Dressing 0-->independent   Eating 0-->independent   Communication (confusion)   Swallowing 2-->difficulty swallowing liquids   Cognition 1 - attention or memory deficits   Fall history within last six months yes   Number of times patient has fallen within last six months 2   Which of the above functional risks had a recent onset or change? ambulation;swallowing;cognition;communication/speech   Prior Functional Level Comment [facility]. Shower weekly with shower chair used.  Has a few falls , tipping over backwards   General Information   Onset of Illness/Injury  "or Date of Surgery - Date 01/10/18   Referring Physician Dr De La Vega   Patient/Family Goals Statement improve strength   Pertinent History of Current Problem (include personal factors and/or comorbidities that impact the POC) Per H&P: \"Shaun Gotti is a 89 year old male who with a history of Type II Diabetes, Coronary Atherosclerosis, Hypertension, Hyperlipidemia, Thrombocytopenia, Type II Diabetes, Chronic Kidney Disease Stage III, and Weakness, who presents to the ED via EMS complaining of weakness. Patient is a resident at a senior facility and is cared for by a RN and other facility staff. Today, patient appeared increasingly weak and short of breath. The facility staff sent the patient to the ED because of his symptoms. He states that he has been experiencing weakness intermittently for \"a couple years\". He also complains of intermittent dizziness, frequent shortness of breath, a slight cough, and occasional chest pain. He notes a \"small amount of vomiting the other day\" and a bout of diarrhea that lasted about 1 week. Patient complains that he \"feels dehydrated\". He states that he is concerned that he had a stroke a couple weeks ago as well, because his son has been experiencing difficulty understanding him. Patient says that his chronic back pain has continued to be bothersome. Patient denies any fever, nausea, urinary issues, headache, or other associated symptoms. SHx of Pacemaker Placement, Cholecystectomy, and Umbilical Hernia Repair. Patient is currently on Plavix\"   Precautions/Limitations fall precautions   Weight-Bearing Status - LUE weight-bearing as tolerated   Weight-Bearing Status - RUE weight-bearing as tolerated   Weight-Bearing Status - LLE weight-bearing as tolerated   Weight-Bearing Status - RLE weight-bearing as tolerated   General Info Comments Family confirms PLOF. Pocket talker used to help but patient not able to give much information. O2 at 2L, vitally stable at rest   Cognitive " Status Examination   Level of Consciousness lethargic/somnolent   Follows Commands and Answers Questions 50% of the time   Pain Assessment   Patient Currently in Pain No  (low back gives him some trouble at times)   Range of Motion (ROM)   ROM Comment Unable to complete steps for AROM. PROM is WFL and was ambulatory up until these worsening status this week. Was feeding himself some this morning so observed spontaneous use of UEs but does not follow commands well for MMT/AROM.    Strength   Strength Comments Unable to formally assess strength. See functional mobility for observations   Bed Mobility   Bed Mobility Comments HOB elevated, moves to EOB partially with max A. Unable to scoot self to support sitting at EOB fully. Mod A sitting balance partially to sitting. Then wants to lay down. Max A for sit to supine and max A x 2 to scoot up in bed.    Transfer Skills   Transfer Comments Unable    Gait   Gait Comments UNable   Balance   Balance Comments Poor sitting balance, and unable standing   Sensory Examination   Sensory Perception Comments Not formally tested   General Therapy Interventions   Intervention Comments Progressive activity, TE , transfers and gait as able   Clinical Impression   Criteria for Skilled Therapeutic Intervention yes, treatment indicated   PT Diagnosis generalized weakness   Influenced by the following impairments Clinical exam   Functional limitations due to impairments All mobility, self cares   Clinical Presentation Evolving/Changing   Clinical Presentation Rationale Baseline mobility good, advanced age   Clinical Decision Making (Complexity) Moderate complexity   Therapy Frequency` daily   Predicted Duration of Therapy Intervention (days/wks) 3 days   Anticipated Discharge Disposition Transitional Care Facility   Risk & Benefits of therapy have been explained Yes   Patient, Family & other staff in agreement with plan of care Yes   Clinical Impression Comments Patient quite weak today,  "worse so than yesterday. Will attempt to assist in mobility progression as he remains in our care   [facility] AM-PAC TM \"6 Clicks\"   2016, Trustees of [facility], under license to [employer].  All rights reserved.   6 Clicks Short Forms Basic Mobility Inpatient Short Form   [facility] AM-[facility]AC  \"6 Clicks\" V.2 Basic Mobility Inpatient Short Form   1. Turning from your back to your side while in a flat bed without using bedrails? 2 - A Lot   2. Moving from lying on your back to sitting on the side of a flat bed without using bedrails? 2 - A Lot   3. Moving to and from a bed to a chair (including a wheelchair)? 1 - Total   4. Standing up from a chair using your arms (e.g., wheelchair, or bedside chair)? 1 - Total   5. To walk in hospital room? 1 - Total   6. Climbing 3-5 steps with a railing? 1 - Total   Basic Mobility Raw Score (Score out of 24.Lower scores equate to lower levels of function) 8   Total Evaluation Time   Total Evaluation Time (Minutes) 20[AN1.1]        Revision History        User Key Date/Time User Provider Type Action    > AN1.1 1/11/2018 12:51 PM Dia Leong PT Physical Therapist Sign            "

## 2018-01-10 NOTE — IP AVS SNAPSHOT
` `     [address] SURGICAL: [phone]            Medication Administration Report for Shaun Gotti as of 01/12/18 1112   Legend:    Given Hold Not Given Due Canceled Entry Other Actions    Time Time (Time) Time  Time-Action       Inactive    Active    Linked        Medications 01/06/18 01/07/18 01/08/18 01/09/18 01/10/18 01/11/18 01/12/18    acetaminophen (TYLENOL) tablet 650 mg  Dose: 650 mg Freq: EVERY 4 HOURS PRN Route: PO  PRN Reason: mild pain  Start: 01/10/18 1837   Admin Instructions: Alternate ibuprofen (if ordered) with acetaminophen.  Maximum acetaminophen dose from all sources = 75 mg/kg/day not to exceed 4 grams/day.           0543 (650 mg)-Given           amLODIPine (NORVASC) tablet 10 mg  Dose: 10 mg Freq: DAILY Route: PO  Start: 01/11/18 0900   Admin Instructions: Hold for SBP less than 110          0813 (10 mg)-Given        0935 (10 mg)-Given           aspirin EC EC tablet 81 mg  Dose: 81 mg Freq: DAILY Route: PO  Start: 01/11/18 0900         0813 (81 mg)-Given        0935 (81 mg)-Given           clopidogrel (PLAVIX) tablet 75 mg  Dose: 75 mg Freq: DAILY Route: PO  Start: 01/11/18 0900         0813 (75 mg)-Given        0935 (75 mg)-Given           glucose 40 % gel 15-30 g  Dose: 15-30 g Freq: EVERY 15 MIN PRN Route: PO  PRN Reason: low blood sugar  Start: 01/10/18 2027   Admin Instructions: Give 15 g for BG 51 to 69 mg/dL IF patient is conscious and able to swallow. Give 30 g for BG less than or equal to 50 mg/dL IF patient is conscious and able to swallow. Do NOT give glucose gel via enteral tube.  IF patient has enteral tube: give apple juice 120 mL (4 oz or 15 g of CHO) via enteral tube for BG 51 to 69 mg/dL.  Give apple juice 240 mL (8 oz or 30 g of CHO) via enteral tube for BG less than or equal to 50 mg/dL.    ~Oral gel is preferable for conscious and able to swallow patient.   ~IF gel unavailable or patient refuses may provide apple juice 120 mL (4 oz or 15 g of  CHO). Document juice on I and O flowsheet.              Or  dextrose 50 % injection 25-50 mL  Dose: 25-50 mL Freq: EVERY 15 MIN PRN Route: IV  PRN Reason: low blood sugar  Start: 01/10/18 2027   Admin Instructions: Use if have IV access, BG less than 70 mg/dL and meet dose criteria below:  Dose if conscious and alert (or disorientated) and NPO = 25 mL  Dose if unconscious / not alert = 50 mL  Vesicant. For ordered doses up to 25 mg, give IV Push undiluted. Give each 5g over 1 minute.              Or  glucagon injection 1 mg  Dose: 1 mg Freq: EVERY 15 MIN PRN Route: SC  PRN Reason: low blood sugar  PRN Comment: May repeat x 1 only  Start: 01/10/18 2027   Admin Instructions: May give SQ or IM. ONLY use glucagon IF patient has NO IV access AND is UNABLE to swallow AND blood glucose is LESS than or EQUAL to 50 mg/dL.  Give IV Push over 1 minute. Reconstitute with 1mL sterile water.               hypromellose-dextran (ARTIFICAL TEARS) ophthalmic solution 1 drop  Dose: 1 drop Freq: 4 TIMES DAILY Route: Both Eyes  Start: 01/10/18 2030   Admin Instructions: Natural tears eye drop has been automatically substituted for Genteal eye drops per [employer]<br>         2124 (1 drop)-Given        0814 (1 drop)-Given       1400 (1 drop)-Given       1808 (1 drop)-Given       2121 (1 drop)-Given        0758 (1 drop)-Given       [ ] 1200       [ ] 1600       [ ] 2000           insulin glargine (LANTUS) injection 8 Units  Dose: 8 Units Freq: EVERY MORNING BEFORE BREAKFAST Route: SC  Start: 01/11/18 0730   Admin Instructions: *Not for IV use, SQ only.  Do not mix with other insulins*          0812 (8 Units)-Given        0757 (8 Units)-Given           metoprolol (TOPROL-XL) 24 hr tablet 50 mg  Dose: 50 mg Freq: DAILY Route: PO  Start: 01/11/18 0900   Admin Instructions: Hold for SBP less than 100          0814 (50 mg)-Given        0935 (50 mg)-Given           naloxone (NARCAN) injection 0.1-0.4 mg  Dose: 0.1-0.4 mg Freq: EVERY  2 MIN PRN Route: IV  PRN Reason: opioid reversal  Start: 01/10/18 1837   Admin Instructions: For respiratory rate LESS than or EQUAL to 8.  Partial reversal dose:  0.1 mg titrated q 2 minutes for Analgesia Side Effects Monitoring Sedation Level of 3 (frequently drowsy, arousable, drifts to sleep during conversation).Full reversal dose:  0.4 mg bolus for Analgesia Side Effects Monitoring Sedation Level of 4 (somnolent, minimal or no response to stimulation).  For ordered doses up to 2mg give IVP. Give each 0.4mg over 15 seconds in emergency situations. For non-emergent situations further dilute in 9mL of NS to facilitate titration of response.               nitroGLYcerin (NITROSTAT) sublingual tablet 0.4 mg  Dose: 0.4 mg Freq: EVERY 5 MIN PRN Route: SL  PRN Reason: chest pain  Start: 01/10/18 2025              omeprazole (priLOSEC) CR capsule 20 mg  Dose: 20 mg Freq: EVERY MORNING Route: PO  Start: 01/11/18 0900         0813 (20 mg)-Given        0935 (20 mg)-Given           ondansetron (ZOFRAN-ODT) ODT tab 4 mg  Dose: 4 mg Freq: EVERY 6 HOURS PRN Route: PO  PRN Reasons: nausea,vomiting  Start: 01/10/18 1837   Admin Instructions: This is Step 1 of nausea and vomiting management.  If nausea not resolved in 15 minutes, go to Step 2 prochlorperazine (COMPAZINE). Do not push through foil backing. Peel back foil and gently remove. Place on tongue immediately. Administration with liquid unnecessary  With dry hands, peel back foil backing and gently remove tablet; do not push oral disintegrating tablet through foil backing; administer immediately on tongue and oral disintegrating tablet dissolves in seconds; then swallow with saliva; liquid not required.              Or  ondansetron (ZOFRAN) injection 4 mg  Dose: 4 mg Freq: EVERY 6 HOURS PRN Route: IV  PRN Reasons: nausea,vomiting  Start: 01/10/18 1837   Admin Instructions: This is Step 1 of nausea and vomiting management.  If nausea not resolved in 15 minutes, go to Step 2  prochlorperazine (COMPAZINE).  Irritant. For ordered doses up to 4 mg, give IV Push undiluted over 2-5 minutes.               sodium chloride (PF) 0.9% PF flush 3 mL  Dose: 3 mL Freq: EVERY 8 HOURS Route: IK  Start: 01/10/18 1845   Admin Instructions: And Q1H PRN, to lock peripheral IV dormant line.         1912 (3 mL)-Given        (0538)-Not Given       (1344)-Not Given       (1808)-Not Given        (0312)-Not Given       0935 (3 mL)-Given              [ ] 1845           sodium chloride (PF) 0.9% PF flush 3 mL  Dose: 3 mL Freq: EVERY 1 HOUR PRN Route: IK  PRN Reason: line flush  PRN Comment: for peripheral IV flush post IV meds  Start: 01/10/18 1213        1328 (3 mL)-Given            Completed Medications  Medications 01/06/18 01/07/18 01/08/18 01/09/18 01/10/18 01/11/18 01/12/18         Dose: 1,000 mL Freq: ONCE Route: IV  Last Dose: Stopped (01/10/18 1320)  Start: 01/10/18 1215   End: 01/10/18 1320        1227 (1,000 mL)-New Bag       1320-Stopped               Dose: 20 mg Freq: ONCE Route: IV  Start: 01/10/18 1320   End: 01/10/18 1330   Admin Instructions: For ordered doses up to 40 mg, give IV Push undiluted over 1-2 minutes.         1328 (20 mg)-Given               Dose: 9 mL Freq: ONCE Route: IV  Start: 01/10/18 1529   End: 01/10/18 1531   Admin Instructions: NDC [id number]         1531 (3 mL)-Given               Dose: 3 mL Freq: ONCE Route: IK  Start: 01/10/18 1529   End: 01/10/18 1530        1530 (3 mL)-Given            Discontinued Medications  Medications 01/06/18 01/07/18 01/08/18 01/09/18 01/10/18 01/11/18 01/12/18         Dose: 1,000 mL Freq: ONCE Route: IV  Start: 01/10/18 1216   End: 01/10/18 1316               1316-Med Discontinued        Followed by    Rate: 125 mL/hr Freq: CONTINUOUS Route: IV  Start: 01/10/18 1216   End: 01/10/18 1316   Admin Instructions: Administer after the boluses.                1316-Med Discontinued           Rate: 100 mL/hr Freq: CONTINUOUS Route: IV  Last Dose: Stopped  (01/12/18 1005)  Start: 01/10/18 2030   End: 01/12/18 0948        2116 ( )-Rate/Dose Change        0746 ( )-New Bag       0958 ( )-Rate/Dose Change        0335 ( )-New Bag       0948-Med Discontinued  1005-Stopped             Rate: 100 mL/hr Freq: CONTINUOUS Route: IV  Start: 01/10/18 1845   End: 01/10/18 2027        1912 ( )-New Bag [C]       2027-Med Discontinued           Dose: 3 mL Freq: EVERY 1 HOUR PRN Route: IK  PRN Reason: line flush  PRN Comment: for peripheral IV flush post IV meds  Start: 01/10/18 1837   End: 01/11/18 0657         0657-Med Discontinued     Medications 01/06/18 01/07/18 01/08/18 01/09/18 01/10/18 01/11/18 01/12/18

## 2018-01-10 NOTE — IP AVS SNAPSHOT
` ` Patient Information     Patient Name Sex     Shaun Gotti ([id number]) Male 1925       Room Bed    251 251-01      Patient Demographics     Address Phone    [address]   [facility] [address] [phone] (Home)  NONE (Work)  NONE (Mobile)      Patient Ethnicity & Race     Ethnic Group Patient Race    American White      Emergency Contact(s)     Name Relation Home Work Mobile    Panchito Gotti Son [phone]    Qian Gotti Daughter [phone] none [phone]    Vika Gotti Daughter [phone] none [phone]      Documents on File        Status Date Received Description       Documents for the Patient    Privacy Notice - [place] Received 09     Insurance Card Received 06 MEDICARE    Face Sheet Received () 09     Insurance Card Received () 06     Insurance Card Received () 08     Privacy Notice - [place] Received 08     External Medication Information Consent Accepted () 09     External Medication Information Consent Accepted () 09/13/10     Face Sheet Received () 09/13/10     HIM ADILIA Authorization  12/07/10 MEDICARE PART A - 10/14/10    Patient ID Received 14 [facility]    Consent for Services - Hospital/Clinic Received () 12/16/10     Insurance Card Received () 11     Consent for Services - Hospital/Clinic Received () 11     Other Received 11     Insurance Card Received () 11     External Medication Information Consent Accepted () 10/21/11     Advance Directives and Living Will Not Received  [facility] Care Directive-02    Insurance Card Received () 10/25/12     Consent for Services - Hospital/Clinic Received () 10/25/12     External Medication Information Consent Accepted 12     Consent for EHR Access  13 Copied from existing Consent for services - C/HOD collected on 10/25/2012     [facility] Specified Other       Advance Directives and Living Will Not Received  VALIDATION OF ADVANCE DIRECTIVE 02    Consent for Services - Geriatrics Received 04/10/13     Insurance Card Received 14 MEDICA    Consent for Services - Hospital/Clinic Received () 14     Physical Therapy Certification Received 14     Physical Therapy Re-Certification Received 14 to 2014    Consent for Services - Hospital/Clinic Received () 05/06/15     Business/Insurance/Care Coordination/Health Form - Patient  06/10/15 ADULT REHABILITATION ATTENDANCE POLICY    Insurance Card Received 08/26/15 [employer]    Patient ID Received 08/26/15     Consent for Services/Privacy Notice - Hospital/Clinic Received () 16     HIM ADILIA Authorization  17 [facility]    Insurance Card Received 17     Insurance Card Received 17 consent    Consent for Services/Privacy Notice - Hospital/Clinic Received 17 see above scanned as ins    Consent for Services/Privacy Notice - Hospital/Clinic Received 17     HIM ADILIA Authorization  10/27/17 Department of [facility]    Care Everywhere Prospective Auth Received 01/10/18        Documents for the Encounter    CMS IM for Patient Signature Received 18     ECG   ECG Report      Admission Information     Attending Provider Admitting Provider Admission Type Admission Date/Time    Chika De La Vega MD Peterson, Elizabeth Joanne, MD Emergency 01/10/18  1145    Discharge Date Hospital Service Auth/Cert Status Service Area     Family Medicine [facility] SERVICES    Unit Room/Bed Admission Status       PH 2A MEDICAL SURGICAL 251/251-01 Admission (Confirmed)       Admission     Complaint    Weakness, Diarrhea      Hospital Account     Name Acct ID Class Status Primary Coverage    Shaun Gotti [id number] Inpatient Open MEDICARE - MEDICARE FOR HB SUPPLEMENT            Guarantor Account (for Hospital  Account #[id number])     Name Relation to Pt Service Area Active? Acct Type    Shaun Gotti  FCS Yes Personal/Family    Address Phone          [address] [place] [address] [phone](H)              Coverage Information (for Hospital Account #[id number])     1. MEDICARE/MEDICARE FOR HB SUPPLEMENT     F/O Payor/Plan Precert #    MEDICARE/MEDICARE FOR HB SUPPLEMENT     Subscriber Subscriber #    Shaun Gotti [id number]    Address Phone    [employer]  [address]  [place] [address] [phone]          2. BCBS/BCBS PLATINUM BLUE     F/O Payor/Plan Precert #    BCBS/BCBS PLATINUM BLUE     Subscriber Subscriber #    Shaun Gotti [id number]    Address Phone    [address] [address]  [place], [place] [id number] [phone]

## 2018-01-10 NOTE — IP AVS SNAPSHOT
"` `           [address] MEDICAL SURGICAL: [phone]                                              INTERAGENCY TRANSFER FORM - NURSING   1/10/2018                    Hospital Admission Date: 1/10/2018  JANELLEKADEN DENISE   : 1925  Sex: Male        Attending Provider: Chika De La Vega MD     Allergies:  Fluoxetine, Lisinopril    Infection:  None   Service:  FAMILY MEDIC    Ht:  1.778 m (5' 10\")   Wt:  71.2 kg (156 lb 15.5 oz)   Admission Wt:  72 kg (158 lb 11.7 oz)    BMI:  22.52 kg/m 2   BSA:  1.88 m 2            Patient PCP Information     Provider PCP Type    Sony Berkowitz MD General      Current Code Status     Date Active Code Status Order ID Comments User Context       Prior      Code Status History     Date Active Date Inactive Code Status Order ID Comments User Context    2018 10:53 AM  DNR/DNI [id number]  Chika De La Vega MD Outpatient    2018 11:30 AM 2018 10:53 AM DNR/DNI [id number]  Chika De La Vega MD Inpatient    1/10/2018  8:27 PM 2018 11:30 AM DNR [id number]  Peter Vann MD Inpatient    2017  1:37 PM 1/10/2018  8:27 PM DNR [id number]  Kurt Carrillo MD Outpatient    2017 11:43 PM 2017  1:37 PM DNR [id number]  Peter Vann MD Inpatient    2016 10:21 AM 2017 11:43 PM DNR [id number]  Kurt Carrillo MD Outpatient    2016  1:08 AM 2016 10:21 AM DNR [id number]  Cash Mcnamara MD Inpatient    3/29/2013 10:28 AM 2016  1:08 AM DNR [id number]  Kurt Carrillo MD Outpatient    3/25/2013  4:11 PM 3/29/2013 10:28 AM DNR [id number]  Joan Hill PA-C Inpatient    3/25/2013  3:28 PM 3/25/2013  4:11 PM Full Code [id number]  Favian Beal, RN Inpatient      Advance Directives        Does patient have a scanned Advance Directive/ACP document in EPIC?           Yes        Hospital Problems as of 2018              Priority Class Noted POA    Diabetes mellitus, type 2 (H) Medium  " 4/28/2003 Yes    Advanced directives, counseling/discussion Low  2/2/2012 Yes    CKD (chronic kidney disease) stage 4, 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) Medium  3/25/2013 Yes    Weakness Medium  8/17/2016 Yes    Coronary artery disease involving native coronary artery - angio in 2006 with multivessel disease managed medically Medium  9/21/2017 Yes    * (Principal)Diarrhea Medium  1/10/2018 Yes    SOB (shortness of breath) Medium  1/10/2018 Yes    Hypoxia Medium  1/10/2018 Yes    Elevated brain natriuretic peptide (BNP) level Medium  1/10/2018 Yes    Pulmonary hypertension Medium  1/10/2018 Yes    Elevated troponin Medium  1/10/2018 Yes    Decreased oral intake Medium  1/11/2018 Yes    Dysphagia Medium  1/11/2018 Yes    Dysarthria Medium  1/11/2018 No      Non-Hospital Problems as of 1/12/2018              Priority Class Noted    Essential hypertension, benign Low  7/7/2006    Coronary atherosclerosis Low  7/7/2006    CARDIOVASCULAR SCREENING; LDL GOAL LESS THAN 100 Medium  10/31/2010    Acute viral bronchitis High  3/25/2013    Pneumonia High  3/25/2013    Cough High  3/25/2013    Acute respiratory failure with hypoxia (H) High  3/25/2013    Bronchitis Medium  3/25/2013    Diabetes type 2, uncontrolled (H) Medium  3/25/2013    Thrombocytopenia (H) Low  3/25/2013    Sepsis (H) High  3/27/2013    Cardiac pacemaker in situ Medium  8/19/2015    Elevated lactic acid level Medium  8/17/2016    CKD (chronic kidney disease) stage 3, GFR 30-59 ml/min Medium  8/17/2016    Fall Medium  9/21/2017    Acute kidney injury (H) Medium  9/21/2017    Hypernatremia Medium  9/21/2017    Hypovolemia Medium  9/22/2017      Immunizations     Name Date      Influenza (H1N1) 01/23/10     Influenza (IIV3) PF 09/28/10     Influenza (IIV3) PF 09/23/09     Influenza (IIV3) PF 10/22/04     Influenza (IIV3) PF 10/09/03     Influenza (IIV3) PF 11/11/02     Influenza (IIV3) PF 10/19/01     Influenza (IIV3) PF 11/06/00     Influenza (IIV3) PF 10/22/99      Influenza (IIV3) PF 10/08/98     Influenza (IIV3) PF 10/21/97     Influenza (IIV3) PF 10/25/96     Influenza (IIV3) PF 10/20/95     Influenza (IIV3) PF 10/20/94     Influenza (IIV3) PF 10/14/93     Influenza (IIV3) PF 11/04/92     Pneumococcal 23 valent 11/11/02     Pneumococcal 23 valent 09/16/97     TD (ADULT, 7+) 05/02/06     TD (ADULT, 7+) 09/16/97          END      ASSESSMENT     Discharge Profile Flowsheet     EXPECTED DISCHARGE     Dressing  2 - assistive person 01/12/18 1046    Expected Discharge Date  01/13/18 01/11/18 1100   Eating  2 - assistive person 01/12/18 1046    DISCHARGE NEEDS ASSESSMENT     Communication  0 - understands/communicates without difficulty 01/12/18 1046    Equipment Currently Used at Home  walker, rolling;shower chair;grab bar (in both shower and surrounding toilet ) 01/12/18 1105   Swallowing  0 - swallows foods/liquids without difficulty 01/12/18 1046    Transportation Available  car;family or friend will provide 01/12/18 1103   GASTROINTESTINAL (ADULT,PEDIATRIC,OB)      # of Referrals Placed by CTS  Post Acute Facilities 01/11/18 1509   GI WDL  WDL 01/12/18 0943    Does Patient Need a Referral for Clinic CC  Yes 01/11/18 1509   Passing flatus  yes 01/12/18 0943    Equipment Used at Home  -- (has shower chair and front wheeled walker) 03/26/13 1040   COMMUNICATION ASSESSMENT      ASSESSMENT OF FUNCTIONAL STATUS     Patient's communication style  spoken language (English or Bilingual) 01/11/18 1109    Prior to admission patient needed assistance with:  Medications;Meal preparation;Laundry/Housekeeping;Shopping;Transportation;Handling finances 01/11/18 1509   SKIN      Assesssment of Functional Status  Needs placement in a SNF/TCF for rehabilitation 01/11/18 1509   Inspection of bony prominences  Full 01/12/18 0943    FUNCTIONAL LEVEL CURRENT     Inspection under devices  Full 01/12/18 0943    Ambulation  2 - assistive person 01/12/18 1046   Skin WDL  WDL 01/12/18 0943     "Transferring  2 - assistive person 01/12/18 1046   SAFETY      Toileting  2 - assistive person 01/12/18 1046   Safety WDL  WDL 01/12/18 0943    Bathing  2 - assistive person 01/12/18 1046   All Alarms  alarm(s) activated and audible 01/12/18 0943                 Assessment WDL (Within Defined Limits) Definitions           Safety WDL     Effective: 09/28/15    Row Information: <b>WDL Definition:</b> Bed in low position, wheels locked; call light in reach; upper side rails up x 2; ID band on<br> <font color=\"gray\"><i>Item=AS safety wdl>>List=AS safety wdl>>Version=F14</i></font>      Skin WDL     Effective: 09/28/15    Row Information: <b>WDL Definition:</b> Warm; dry; intact; elastic; without discoloration; pressure points without redness<br> <font color=\"gray\"><i>Item=AS skin wdl>>List=AS skin wdl>>Version=F14</i></font>      Vitals     Vital Signs Flowsheet     VITAL SIGNS     POSITIONING      Temp  99.4  F (37.4  C) 01/12/18 0757   Body Position  turned;side-lying, left;side-lying, right;upper extremity elevated, left;upper extremity elevated, right 01/12/18 0143    Temp src  Oral 01/12/18 0757   Head of Bed (HOB)  HOB at 20-30 degrees 01/12/18 0143    Resp  20 01/12/18 0757   Positioning/Transfer Devices  pillows 01/12/18 0143    Pulse  100 01/12/18 0757   Chair  Upright in chair 01/12/18 0943    Heart Rate  95 01/12/18 0311   DAILY CARE      Pulse/Heart Rate Source  Monitor 01/12/18 0311   Activity Management  activity adjusted per tolerance;ambulated in room 01/12/18 0943    BP  150/76 01/12/18 0757   Activity Assistance Provided  assistance, 1 person 01/12/18 0943    BP Location  Right arm 01/12/18 0311   EKG MONITORING      OXYGEN THERAPY     Cardiac Regularity  Regular 01/10/18 1525    SpO2  (!)  89 % 01/12/18 0939   Cardiac Rhythm  Other (Comment) (demand pacer) 01/10/18 1525    O2 Device  Nasal cannula 01/12/18 0939   CLINICALLY ALIGNED PAIN ASSESSMENT (CAPA) ([facility], [facility] AND [facility] ADULTS ONLY)      " "Oxygen Delivery  1 LPM 01/12/18 0939   Comfort  negligible pain 01/12/18 0006    PAIN/COMFORT     Change in Pain  about the same 01/10/18 1157    Patient Currently in Pain  denies 01/12/18 0757   Pain Control  inadequate pain control 01/10/18 1157    0-10 Pain Scale  0 01/10/18 1556   Functioning  pain keeps me from doing most of what I need to do 01/10/18 1157    HEIGHT AND WEIGHT     Sleep  awake with occasional pain 01/10/18 1157    Height  1.778 m (5' 10\") 01/10/18 1836   LAUREN COMA SCALE      Weight  71.2 kg (156 lb 15.5 oz) 01/10/18 1836   Best Eye Response  4-->(E4) spontaneous 01/12/18 0943    Estimated Weight (From ED)  72 kg (158 lb 11.7 oz) 01/10/18 1157   Best Motor Response  6-->(M6) obeys commands 01/12/18 0943    BSA (Calculated - sq m)  1.88 01/10/18 1836   Best Verbal Response  4-->(V4) confused 01/12/18 0943    BMI (Calculated)  22.57 01/10/18 1836   Lauren Coma Scale Score  14 01/12/18 0943            Patient Lines/Drains/Airways Status    Active LINES/DRAINS/AIRWAYS     None            Patient Lines/Drains/Airways Status    Active PICC/CVC     None            Intake/Output Detail Report     Date Intake     Output Net    Shift P.O. I.V. IV Piggyback Total Urine Total       Noc 01/10/18 2300 - 01/11/18 0659 -- 918 -- 918 150 150 768    Day 01/11/18 0700 - 01/11/18 9125 401 6505.08 -- 1977.08 .08    [place] 01/11/18 1500 - 01/11/18 2259 -- 725 -- 725 300 300 425    Noc 01/11/18 2300 - 01/12/18 0659 -- 798.33 -- 798.33 425 425 373.33    Day 01/12/18 0700 - 01/12/18 1459 540 -- -- 540 -- -- 540      Last Void/BM       Most Recent Value    Urine Occurrence 1 at 01/11/2018 0924    Stool Occurrence       Case Management/Discharge Planning     Case Management/Discharge Planning Flowsheet     REFERRAL INFORMATION     ASSESSMENT OF FUNCTIONAL STATUS      Did the Initial Social Work Assessment result in a Social Work Case?  Yes 01/11/18 1407   Prior to admission patient needed assistance with:  " Medications;Meal preparation;Laundry/Housekeeping;Shopping;Transportation;Handling finances 01/11/18 1509    Admission Type  inpatient 01/11/18 1509   Assesssment of Functional Status  Needs placement in a SNF/TCF for rehabilitation 01/11/18 1509    Arrived From  home or self-care 01/11/18 1509   EMPLOYMENT      Referral Source  physician 01/11/18 1509   Do you work full or part-time?  no 01/11/18 1509    New Steerage to  Clinics?  No 01/11/18 1509   COPING/STRESS      # of Referrals Placed by CTS  Post Acute Facilities 01/11/18 1509   Major Change/Loss/Stressor  none 01/11/18 1109    Reason For Consult  discharge planning 01/11/18 1509   Sources Of Support  adult child(joseph) 01/11/18 1509    Record Reviewed  history and physical;medical record 01/11/18 1509   EXPECTED DISCHARGE      CTS Assigned to Tej Vega  01/11/18 1509   Expected Discharge Date  01/13/18 01/11/18 1100    [facility] Name  [facility]  01/11/18 1509   DISCHARGE PLANNING      Primary Care MD Name  Dr. Sony Berkowitz  01/11/18 1509   Transportation Available  car;family or friend will provide 01/12/18 1103    LIVING ENVIRONMENT     Does Patient Need a Referral for Clinic CC  Yes 01/11/18 1509    Lives With  alone 01/12/18 1103   Skilled Nursing Facility  [facility] 03/29/13 1055    Living Arrangements  apartment;independent living facility 01/12/18 1103   Equipment Used at Home  -- (has shower chair and front wheeled walker) 03/26/13 1040    Provides Primary Care For  no one, unable/limited ability to care for self 01/11/18 1509   FINAL RESOURCES      Primary Care Provided By  child(joseph) (specify);homecare agency (specify) ([facility] ) 01/11/18 1509   Equipment Currently Used at Home  walker, rolling;shower chair;grab bar (in both shower and surrounding toilet ) 01/12/18 1105    Quality Of Family Relationships  supportive;helpful;involved 01/11/18 1509   ABUSE RISK SCREEN      HOME  SAFETY     QUESTION TO PATIENT:  Has a member of your family or a partner(now or in the past) intimidated, hurt, manipulated, or controlled you in any way?  no 01/11/18 1109    Patient Feels Safe Living in Home?  yes 01/11/18 1509   QUESTION TO PATIENT: Do you feel safe going back to the place where you are living?  yes 01/11/18 1109    ASSESSMENT OF FAMILY/SOCIAL SUPPORT     OBSERVATION: Is there reason to believe there has been maltreatment of a vulnerable adult (ie. Physical/Sexual/Emotional abuse, self neglect, lack of adequate food, shelter, medical care, or financial exploitation)?  no 01/11/18 1109    Who is your support system?  Children;Other (specify) (home care staff) 01/11/18 1509   (R) MENTAL HEALTH SUICIDE RISK      Description of Support System  Supportive;Involved 01/11/18 1509   Are you depressed or being treated for depression?  Yes 01/11/18 1109    Support Assessment  Lacks adequate physical care 01/11/18 1509   HOMICIDE RISK      Quality of Family Relationships  supportive;involved;evident 01/11/18 1509   Feels Like Hurting Others  no 01/11/18 1109    Communication preferences  phone 01/11/18 1509

## 2018-01-10 NOTE — IP AVS SNAPSHOT
"          [address] SURGICAL: [phone]                                              INTERAGENCY TRANSFER FORM - LAB / IMAGING / EKG / EMG RESULTS   1/10/2018                    Hospital Admission Date: 1/10/2018  JANELLE DENISE   : 1925  Sex: Male        Attending Provider: Chika De La Vega MD     Allergies:  Fluoxetine, Lisinopril    Infection:  None   Service:  FAMILY MEDIC    Ht:  1.778 m (5' 10\")   Wt:  71.2 kg (156 lb 15.5 oz)   Admission Wt:  72 kg (158 lb 11.7 oz)    BMI:  22.52 kg/m 2   BSA:  1.88 m 2            Patient PCP Information     Provider PCP Type    Sony Berkowitz MD General         Lab Results - 3 Days      Glucose by meter [[id number]] (Abnormal)  Resulted: 18 0756, Result status: Final result    Ordering provider: Chika De La Vega MD  18 0751 Resulting lab: POINT OF CARE TEST, GLUCOSE    Specimen Information    Type Source Collected On     18 0751          Components       Value Reference Range Flag Lab   Glucose 152 70 - 99 mg/dL H 170            Basic metabolic panel [[id number]] (Abnormal)  Resulted: 18 0632, Result status: Final result    Ordering provider: Chika De La Vega MD  18 0001 Resulting lab: [facility]    Specimen Information    Type Source Collected On   Blood  18 0605          Components       Value Reference Range Flag Lab   Sodium 146 133 - 144 mmol/L H [facility]   Potassium 3.9 3.4 - 5.3 mmol/L  [facility]   Chloride 113 94 - 109 mmol/L H [facility]   Carbon Dioxide 19 20 - 32 mmol/L L [facility]   Anion Gap 14 3 - 14 mmol/L  [facility]   Glucose 157 70 - 99 mg/dL H [facility]   Urea Nitrogen 39 7 - 30 mg/dL H [facility]   Creatinine 2.46 0.66 - 1.25 mg/dL H [facility]   GFR Estimate 25 >60 mL/min/1.7m2 L [facility]   Comment:  Non  GFR Calc   GFR Estimate If Black 30 >60 mL/min/1.7m2 L [facility]   Comment:  African American GFR Calc   Calcium 8.3 8.5 - 10.1 mg/dL L " [facility]            Troponin I [[id number]] (Abnormal)  Resulted: 01/12/18 0632, Result status: Final result    Ordering provider: Chika De La Vega MD  01/12/18 0001 Resulting lab: [facility]    Specimen Information    Type Source Collected On   Blood  01/12/18 0605          Components       Value Reference Range Flag Lab   Troponin I ES 0.098 0.000 - 0.045 ug/L H [facility]   Comment:         The 99th percentile for upper reference range is 0.045 ug/L.  Troponin values   in the range of 0.045 - 0.120 ug/L may be associated with risks of adverse   clinical events.              CBC with platelets [[id number]] (Abnormal)  Resulted: 01/12/18 0613, Result status: Final result    Ordering provider: Chika De La Vega MD  01/12/18 0001 Resulting lab: [facility]    Specimen Information    Type Source Collected On   Blood  01/12/18 0605          Components       Value Reference Range Flag Lab   WBC 10.0 4.0 - 11.0 10e9/L  [facility]   RBC Count 3.64 4.4 - 5.9 10e12/L L [facility]   Hemoglobin 11.0 13.3 - 17.7 g/dL L [facility]   Hematocrit 34.7 40.0 - 53.0 % L [facility]   MCV 95 78 - 100 fl  [facility]   MCH 30.2 26.5 - 33.0 pg  [facility]   MCHC 31.7 31.5 - 36.5 g/dL  [facility]   RDW 13.6 10.0 - 15.0 %  [facility]   Platelet Count 149 150 - 450 10e9/L L [facility]            Glucose by meter [[id number]] (Abnormal)  Resulted: 01/12/18 0311, Result status: Final result    Ordering provider: Chika De La Vega MD  01/12/18 0307 Resulting lab: POINT OF CARE TEST, GLUCOSE    Specimen Information    Type Source Collected On     01/12/18 0307          Components       Value Reference Range Flag Lab   Glucose 146 70 - 99 mg/dL H 170            Glucose by meter [[id number]] (Abnormal)  Resulted: 01/11/18 2206, Result status: Final result    Ordering provider: Chika De La Vega MD  01/11/18 2200 Resulting lab: POINT OF CARE TEST, GLUCOSE    Specimen Information    Type Source  Collected On     01/11/18 2200          Components       Value Reference Range Flag Lab   Glucose 128 70 - 99 mg/dL H 170            Glucose by meter [[id number]] (Abnormal)  Resulted: 01/11/18 1640, Result status: Final result    Ordering provider: Chika De La Vega MD  01/11/18 1636 Resulting lab: POINT OF CARE TEST, GLUCOSE    Specimen Information    Type Source Collected On     01/11/18 1636          Components       Value Reference Range Flag Lab   Glucose 143 70 - 99 mg/dL H 170            Methicillin resistant staph aureus cult [[id number]]  Resulted: 01/11/18 1352, Result status: In process    Ordering provider: Chika De La Vega MD  01/11/18 1346 Resulting lab: [facility]    Specimen Information    Type Source Collected On   Nose  01/11/18 1345            Glucose by meter [[id number]] (Abnormal)  Resulted: 01/11/18 1227, Result status: Final result    Ordering provider: Chika De La Vega MD  01/11/18 1159 Resulting lab: POINT OF CARE TEST, GLUCOSE    Specimen Information    Type Source Collected On     01/11/18 1159          Components       Value Reference Range Flag Lab   Glucose 174 70 - 99 mg/dL H 170            Troponin I [[id number]] (Abnormal)  Resulted: 01/11/18 1142, Result status: Final result    Ordering provider: Peter Vann MD  01/11/18 0001 Resulting lab: [facility]    Specimen Information    Type Source Collected On   Blood  01/11/18 0630          Components       Value Reference Range Flag Lab   Troponin I ES 0.157 0.000 - 0.045 ug/L  59   Comment:         Previous critical documented  The 99th percentile for upper reference range is 0.045 ug/L.  Troponin values   in the range of 0.045 - 0.120 ug/L may be associated with risks of adverse   clinical events.              Glucose by meter [[id number]] (Abnormal)  Resulted: 01/11/18 0756, Result status: Final result    Ordering provider: Chika De La Vega MD  01/11/18 0752 Resulting  lab: POINT OF CARE TEST, GLUCOSE    Specimen Information    Type Source Collected On     01/11/18 0752          Components       Value Reference Range Flag Lab   Glucose 174 70 - 99 mg/dL H 170            Basic metabolic panel [[id number]] (Abnormal)  Resulted: 01/11/18 0707, Result status: Final result    Ordering provider: Peter Vann MD  01/11/18 0001 Resulting lab: [facility]    Specimen Information    Type Source Collected On   Blood  01/11/18 0630          Components       Value Reference Range Flag Lab   Sodium 142 133 - 144 mmol/L  [facility]   Potassium 3.9 3.4 - 5.3 mmol/L  [facility]   Chloride 109 94 - 109 mmol/L  [facility]   Carbon Dioxide 21 20 - 32 mmol/L  [facility]   Anion Gap 12 3 - 14 mmol/L  [facility]   Glucose 175 70 - 99 mg/dL H [facility]   Urea Nitrogen 40 7 - 30 mg/dL H [facility]   Creatinine 2.55 0.66 - 1.25 mg/dL H [facility]   GFR Estimate 24 >60 mL/min/1.7m2 L [facility]   Comment:  Non  GFR Calc   GFR Estimate If Black 29 >60 mL/min/1.7m2 L [facility]   Comment:  African American GFR Calc   Calcium 8.4 8.5 - 10.1 mg/dL L [facility]            Glucose by meter [[id number]] (Abnormal)  Resulted: 01/11/18 0206, Result status: Final result    Ordering provider: Chika De La Vega MD  01/11/18 0201 Resulting lab: POINT OF CARE TEST, GLUCOSE    Specimen Information    Type Source Collected On     01/11/18 0201          Components       Value Reference Range Flag Lab   Glucose 179 70 - 99 mg/dL H 170            Troponin I [[id number]] (Abnormal)  Resulted: 01/10/18 1647, Result status: Final result    Ordering provider: Rita Salter MD  01/10/18 1613 Resulting lab: [facility]    Specimen Information    Type Source Collected On   Blood  01/10/18 1620          Components       Value Reference Range Flag Lab   Troponin I ES 0.154 0.000 - 0.045 ug/L [facility]   Comment:         Previous critical documented  The 99th percentile for  upper reference range is 0.045 ug/L.  Troponin values   in the range of 0.045 - 0.120 ug/L may be associated with risks of adverse   clinical events.              UA with Microscopic reflex to Culture [[id number]] (Abnormal)  Resulted: 01/10/18 1434, Result status: Final result    Ordering provider: Rita Salter MD  01/10/18 1215 Resulting lab: [facility]    Specimen Information    Type Source Collected On   Midstream Urine Urine clean catch 01/10/18 1418          Components       Value Reference Range Flag Lab   Color Urine Yellow   [facility]   Appearance Urine Clear   [facility]   Glucose Urine Negative NEG^Negative mg/dL  [facility]   Bilirubin Urine Negative NEG^Negative  [facility]   Ketones Urine 5 NEG^Negative mg/dL A [facility]   Specific [place] Urine 1.009 1.003 - 1.035  [facility]   Blood Urine Small NEG^Negative A [facility]   pH Urine 5.0 5.0 - 7.0 pH  [facility]   Protein Albumin Urine 100 NEG^Negative mg/dL A [facility]   Urobilinogen mg/dL 0.0 0.0 - 2.0 mg/dL  [facility]   Nitrite Urine Negative NEG^Negative  [facility]   Leukocyte Esterase Urine Negative NEG^Negative  [facility]   Source Midstream Urine   [facility]   WBC Urine <1 0 - 2 /HPF  [facility]   RBC Urine 4 0 - 2 /HPF H [facility]   Squamous Epithelial /HPF Urine 1 0 - 1 /HPF  [facility]   Mucous Urine Present NEG^Negative /LPF A [facility]   Hyaline Casts 1 0 - 2 /LPF  [facility]            Troponin I [[id number]] (Abnormal)  Resulted: 01/10/18 1310, Result status: Final result    Ordering provider: Rita Salter MD  01/10/18 1215 Resulting lab: [facility]    Specimen Information    Type Source Collected On   Blood  01/10/18 1225          Components       Value Reference Range Flag Lab   Troponin I ES 0.183 0.000 - 0.045 ug/L [facility]   Comment:         The 99th percentile for upper reference range is 0.045 ug/L.  Troponin values   in the range of 0.045 - 0.120 ug/L may be associated with risks of adverse   clinical  events.  Critical Value called to and read back by  AREN BAGLEY RN IN ED AT 1309 ON 1/10/18 DC              Nt probnp inpatient (BNP) [[id number]] (Abnormal)  Resulted: 01/10/18 1308, Result status: Final result    Ordering provider: Rita Salter MD  01/10/18 1215 Resulting lab: [facility]    Specimen Information    Type Source Collected On   Blood  01/10/18 1225          Components       Value Reference Range Flag Lab   N-Terminal Pro BNP Inpatient 42077 0 - 1800 pg/mL H 59   Comment:            Reference range shown and results flagged as abnormal are suggested inpatient   cut points for confirming diagnosis if CHF in an acute setting. Establishing a   baseline value for each individual patient is useful for follow-up. An   inpatient or emergency department NT-proPBNP <300 pg/mL effectively rules out   acute CHF, with 99% negative predictive value.  The outpatient non-acute reference range for ruling out CHF is:   0-125 pg/mL (age 18 to less than 75)   0-450 pg/mL (age 75 yrs and older)              Phosphorus [[id number]]  Resulted: 01/10/18 1303, Result status: Final result    Ordering provider: Rita Salter MD  01/10/18 1215 Resulting lab: [facility]    Specimen Information    Type Source Collected On   Blood  01/10/18 1225          Components       Value Reference Range Flag Lab   Phosphorus 2.8 2.5 - 4.5 mg/dL  [facility]            Comprehensive metabolic panel [[id number]] (Abnormal)  Resulted: 01/10/18 1303, Result status: Final result    Ordering provider: Rita Salter MD  01/10/18 1215 Resulting lab: [facility]    Specimen Information    Type Source Collected On   Blood  01/10/18 1225          Components       Value Reference Range Flag Lab   Sodium 142 133 - 144 mmol/L  [facility]   Potassium 3.9 3.4 - 5.3 mmol/L  [facility]   Chloride 108 94 - 109 mmol/L  [facility]   Carbon Dioxide 27 20 - 32 mmol/L  [facility]    Anion Gap 7 3 - 14 mmol/L  [facility]   Glucose 100 70 - 99 mg/dL H [facility]   Urea Nitrogen 40 7 - 30 mg/dL H [facility]   Creatinine 2.50 0.66 - 1.25 mg/dL H [facility]   GFR Estimate 24 >60 mL/min/1.7m2 L [facility]   Comment:  Non  GFR Calc   GFR Estimate If Black 29 >60 mL/min/1.7m2 L [facility]   Comment:  African American GFR Calc   Calcium 9.1 8.5 - 10.1 mg/dL  [facility]   Bilirubin Total 0.8 0.2 - 1.3 mg/dL  [facility]   Albumin 3.3 3.4 - 5.0 g/dL L [facility]   Protein Total 7.6 6.8 - 8.8 g/dL  [facility]   Alkaline Phosphatase 95 40 - 150 U/L  [facility]   ALT 22 0 - 70 U/L  [facility]   AST 21 0 - 45 U/L  [facility]            Magnesium [[id number]]  Resulted: 01/10/18 1303, Result status: Final result    Ordering provider: Rita Salter MD  01/10/18 1210 Resulting lab: [facility]    Specimen Information    Type Source Collected On   Blood  01/10/18 1225          Components       Value Reference Range Flag Lab   Magnesium 2.1 1.6 - 2.3 mg/dL  [facility]            D dimer quantitative [[id number]] (Abnormal)  Resulted: 01/10/18 1256, Result status: Final result    Ordering provider: Rita Salter MD  01/10/18 1210 Resulting lab: [facility]    Specimen Information    Type Source Collected On   Blood  01/10/18 1225          Components       Value Reference Range Flag Lab   D Dimer 2.9 0.0 - 0.50 ug/ml FEU H [facility]   Comment:         This D-dimer assay is intended for use in conjunction with a clinical pretest   probability assessment model to exclude pulmonary embolism (PE) and deep   venous thrombosis (DVT) in outpatients suspected of PE or DVT. The cut-off   value is 0.5 ug/mL FEU.              Lactic acid whole blood [[id number]]  Resulted: 01/10/18 1240, Result status: Final result    Ordering provider: Rita Salter MD  01/10/18 1218 Resulting lab: [facility]    Specimen Information    Type Source Collected On   Blood   01/10/18 1225          Components       Value Reference Range Flag Lab   Lactic Acid 0.9 0.7 - 2.0 mmol/L  [facility]            Blood gas venous [[id number]] (Abnormal)  Resulted: 01/10/18 1240, Result status: Final result    Ordering provider: Rita Salter MD  01/10/18 1215 Resulting lab: [facility]    Specimen Information    Type Source Collected On   Blood  01/10/18 1225          Components       Value Reference Range Flag Lab   Ph Venous 7.42 7.32 - 7.43 pH  [facility]   PCO2 Venous 38 40 - 50 mm Hg L [facility]   PO2 Venous 28 25 - 47 mm Hg  [facility]   Bicarbonate Venous 25 21 - 28 mmol/L  [facility]   Base Excess Venous 0.3 mmol/L  [facility]   Comment:  Reference range:  -7.7 to 1.9   FIO2 28   [facility]            CBC with platelets differential [[id number]] (Abnormal)  Resulted: 01/10/18 1239, Result status: Final result    Ordering provider: Rita Salter MD  01/10/18 1215 Resulting lab: [facility]    Specimen Information    Type Source Collected On   Blood  01/10/18 1225          Components       Value Reference Range Flag Lab   WBC 6.9 4.0 - 11.0 10e9/L  [facility]   RBC Count 3.86 4.4 - 5.9 10e12/L L [facility]   Hemoglobin 11.6 13.3 - 17.7 g/dL L [facility]   Hematocrit 36.6 40.0 - 53.0 % L [facility]   MCV 95 78 - 100 fl  [facility]   MCH 30.1 26.5 - 33.0 pg  [facility]   MCHC 31.7 31.5 - 36.5 g/dL  [facility]   RDW 13.4 10.0 - 15.0 %  [facility]   Platelet Count 156 150 - 450 10e9/L  [facility]   Diff Method Automated Method   [facility]   % Neutrophils 77.7 %  [facility]   % Lymphocytes 8.6 %  [facility]   % Monocytes 11.4 %  [facility]   % Eosinophils 1.5 %  [facility]   % Basophils 0.4 %  [facility]   % Immature Granulocytes 0.4 %  [facility]   Absolute Neutrophil 5.3 1.6 - 8.3 10e9/L  [facility]   Absolute Lymphocytes 0.6 0.8 - 5.3 10e9/L L [facility]   Absolute Monocytes 0.8 0.0 - 1.3 10e9/L  [facility]   Absolute Eosinophils 0.1 0.0 - 0.7 10e9/L  [facility]   Absolute Basophils 0.0 0.0 - 0.2 10e9/L   [facility]   Abs Immature Granulocytes 0.0 0 - 0.4 10e9/L  [facility]            Testing Performed By     Lab - Abbreviation Name Director Address Valid Date Range    22 - FN Lab [facility] Unknown [address] [place] [address] 05/08/15 1057 - Present    45 - MST142 [facility] Unknown Unknown 01/28/02 0000 - Present    59 - Unknown [facility] Unknown [address] [place] [address] 12/31/14 1006 - Present    170 - Unknown POINT OF CARE TEST, GLUCOSE Unknown Unknown 10/31/11 1114 - Present            Unresulted Labs     None         Imaging Results - 3 Days      XR Chest 2 Views [[id number]]  Resulted: 01/10/18 1607, Result status: Final result    Ordering provider: Rita Salter MD  01/10/18 1215 Resulted by: Felicity Dooley MD    Performed: 01/10/18 1230 - 01/10/18 1251 Resulting lab: RADIOLOGY RESULTS    Narrative:       CHEST TWO VIEWS   1/10/2018 12:51 PM     HISTORY: Shortness of breath.    COMPARISON: Chest x-rays dated 9/21/2017 and 3/26/2013.    FINDINGS:  Dual channel pacemaker and leads are stable in appearance.  There is mild vascular congestion, new or increased since the most  recent prior study. Cardiac silhouette is within normal limits for  size. No pneumothorax is identified. There are small bilateral pleural  fluid collections. No fracture is seen. There is diffuse osteopenia.      Impression:       IMPRESSION:  1. Findings concerning for mild congestive heart failure given the  pleural fluid collections and mild vascular congestion. No definite  cardiomegaly, however, is seen.  2. Otherwise stable chest x-rays.  3. I recommend appropriate clinical treatment and followup chest  x-rays after resolution of current symptoms to ensure resolution of  the chest x-ray findings.    FELICITY DOOLEY MD      Head CT w/o contrast [[id number]]  Resulted: 01/10/18 1600, Result status: Final result    Ordering provider: Rita Salter MD   01/10/18 1324 Resulted by: Felicity Dooley MD    Performed: 01/10/18 1401 - 01/10/18 1409 Resulting lab: RADIOLOGY RESULTS    Narrative:       CT SCAN OF THE HEAD WITHOUT CONTRAST   1/10/2018 2:09 PM     HISTORY: Possible stroke-like symptoms.    TECHNIQUE: 4 mm thick axial images of the head without IV contrast  material. Radiation dose for this scan was reduced using automated  exposure control, adjustment of the mA and/or kV according to patient  size, or iterative reconstruction technique.    COMPARISON: 1/22/2010    FINDINGS:  There is generalized atrophy of the brain.  Areas of low  attenuation are present in the white matter of the cerebral  hemispheres that are consistent with small vessel ischemic disease in  this age patient.  There is no evidence of intracranial hemorrhage,  mass, acute infarct or anomaly. The visualized portions of the sinuses  and mastoids appear normal. Bilateral ocular lens replacements are  noted. There is no evidence of trauma.       Impression:       IMPRESSION:  1. Worsening of age-related atrophy and small vessel white matter  ischemic changes.  2. No acute intracranial hemorrhage.    I discussed these findings with Dr. Salter on 1/10/2018 at  approximately 2:20 PM.    FELICITY DOOLEY MD      Testing Performed By     Lab - Abbreviation Name Director Address Valid Date Range    104 - Rad Rslts RADIOLOGY RESULTS Unknown Unknown 02/16/05 1553 - Present               ECG/EMG Results      ECHO COMPLETE WITH OPTISON [[id number]]  Resulted: 01/10/18 1511, Result status: Edited Result - FINAL    Ordering provider: Rita Salter MD  01/10/18 1421 Resulted by: Favian Owens MD    Performed: 01/10/18 1522 - 01/10/18 1533 Resulting lab: RADIOLOGY RESULTS    Narrative:       [id number]  [facility]  [id number]  [id number]^BEAU^RITA^YASMINE           [facility]  Echocardiography Laboratory  [address] [place] [address]        Name: HOWIE  JANELLE ARROYO  MRN: [id number]  : 1925  Study Date: 01/10/2018 03:11 PM  Age: 89 yrs  Gender: Male  Patient Location: [facility]  Reason For Study: CHF  Ordering Physician: CARLITOS YANEZ  Referring Physician: Sony Berkowitz  Performed By: Panchito Lopez     BSA: 1.9 m2  Height: 70 in  Weight: 159 lb  HR: 83  BP: 168/79 mmHg  _____________________________________________________________________________  __        Procedure  Complete Portable Echo Adult. Contrast Optison.  _____________________________________________________________________________  __        Interpretation Summary     Left ventricular systolic function is normal.  The visual ejection fraction is estimated at 55-60%.  The left ventricle is normal in size.  There is mild concentric left ventricular hypertrophy.  The right ventricle is normal in structure, function and size.  There is a pacemaker lead in the right ventricle.  Right ventricular systolic pressure is elevated, consistent with moderate  pulmonary hypertension.     No old studies available for comparison  _____________________________________________________________________________  __        Left Ventricle  The left ventricle is normal in size. There is mild concentric left  ventricular hypertrophy. Left ventricular systolic function is normal. The  visual ejection fraction is estimated at 55-60%. Left ventricular diastolic  function is indeterminate. Septal wall motion abnormality may reflect  pacemaker activation. There is no thrombus seen in the left ventricle.     Right Ventricle  The right ventricle is normal in structure, function and size. There is a  pacemaker lead in the right ventricle.     Atria  Normal left atrial size. Pacer wire in right atrium. There is no atrial shunt  seen.     Mitral Valve  There is mild to moderate mitral annular calcification. There is no mitral  regurgitation noted. There is no mitral valve stenosis.        Tricuspid Valve  Normal tricuspid valve.  The right ventricular systolic pressure is elevated at  48.3 mmHg. Right ventricular systolic pressure is elevated, consistent with  moderate pulmonary hypertension. There is no tricuspid stenosis.     Aortic Valve  The aortic valve is trileaflet. No aortic regurgitation is present. No aortic  stenosis is present.     Pulmonic Valve  The pulmonic valve is not well seen, but is grossly normal. There is no  pulmonic valvular regurgitation. There is no pulmonic valvular stenosis.     Vessels  The aortic root is normal size. Normal size ascending aorta. Inferior vena  cava not well visualized for estimation of right atrial pressure.     Pericardium  The pericardium appears normal. There is no pleural effusion.        Rhythm  The rhythm was paced.  _____________________________________________________________________________  __  MMode/2D Measurements & Calculations  IVSd: 1.2 cm     LVIDd: 3.8 cm  LVIDs: 2.8 cm  LVPWd: 1.2 cm  FS: 26.2 %  EDV(Teich): 60.0 ml  ESV(Teich): 28.7 ml  LV mass(C)d: 154.9 grams  LV mass(C)dI: 81.8 grams/m2  Ao root diam: 2.8 cm  LA dimension: 3.1 cm  asc Aorta Diam: 3.3 cm  LA/Ao: 1.1  LVOT diam: 2.0 cm  LVOT area: 3.1 cm2  RWT: 0.65        Doppler Measurements & Calculations  MV E max luli: 91.7 cm/sec  MV A max luli: 116.7 cm/sec  MV E/A: 0.79  MV dec time: 0.20 sec  TR max luli: 347.5 cm/sec  TR max P.3 mmHg  E/E' av.2  Lateral E/e': 13.3  Medial E/e': 15.1              _____________________________________________________________________________  __        Report approved by: Dr. Favian Nuñez 01/10/2018 03:50 PM       1    Type Source Collected On     01/10/18 1511          View Image (below)        Echocardiogram Complete [[id number]]  Resulted: 01/10/18 1523, Result status: In process    Ordering provider: Rita Salter MD  01/10/18 1421 Performed: 01/10/18 1522 - 01/10/18 1522    Resulting lab: [facility]                   Encounter-Level Documents:     There are no  encounter-level documents.      Order-Level Documents:     There are no order-level documents.

## 2018-01-11 PROBLEM — R47.1 DYSARTHRIA: Status: ACTIVE | Noted: 2018-01-01

## 2018-01-11 PROBLEM — R63.8 DECREASED ORAL INTAKE: Status: ACTIVE | Noted: 2018-01-01

## 2018-01-11 PROBLEM — R13.10 DYSPHAGIA: Status: ACTIVE | Noted: 2018-01-01

## 2018-01-11 NOTE — ED NOTES
ED Nursing criteria listed below was addressed during verbal handoff:     Abnormal vitals: Yes  Abnormal results: Yes  Med Reconciliation completed: Yes  Meds given in ED: Yes  Any Overdue Meds: N/A  Core Measures: Yes  Isolation: N/A  Special needs: Yes ( fall risk, [place], legally blind, confusion)  Skin assessment: Yes    Observation Patient  Education provided: Yes ( daughter as pt is confused)

## 2018-01-11 NOTE — PROGRESS NOTES
" 01/11/18 0800   Quick Adds   Type of Visit Initial PT Evaluation   Living Environment   Lives With facility resident   Home Accessibility tub/shower is not walk in   Number of Stairs to Enter Home 0   Number of Stairs Within Home 0   Transportation Available family or friend will provide   [facility] [employer]. Son visits daily for several hours each day; helping with medications, shopping, meal preparation each day. 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)   Self-Care   Usual Activity Tolerance fair   Current Activity Tolerance poor   Regular Exercise yes   Activity/Exercise Type walking   Exercise Amount/Frequency daily   Equipment Currently Used at Home walker, rolling   Activity/Exercise/Self-Care Comment Slower decline in activity this past year, very weak this week   Functional Level Prior   Ambulation 1-->assistive equipment   Transferring 1-->assistive equipment   Toileting 0-->independent   Bathing 3-->assistive equipment and person   Dressing 0-->independent   Eating 0-->independent   Communication (confusion)   Swallowing 2-->difficulty swallowing liquids   Cognition 1 - attention or memory deficits   Fall history within last six months yes   Number of times patient has fallen within last six months 2   Which of the above functional risks had a recent onset or change? ambulation;swallowing;cognition;communication/speech   Prior Functional Level [facility] [employer]. Shower weekly with shower chair used.  Has a few falls , tipping over backwards   General Information   Onset of Illness/Injury or Date of Surgery - Date 01/10/18   Referring Physician Dr De La Vega   Patient/Family Goals Statement improve strength   Pertinent History of Current Problem (include personal factors and/or comorbidities that impact the POC) Per H&P: \"Shaun Gotti is a 89 year old male who with a history of Type II Diabetes, Coronary Atherosclerosis, Hypertension, " "Hyperlipidemia, Thrombocytopenia, Type II Diabetes, Chronic Kidney Disease Stage III, and Weakness, who presents to the ED via EMS complaining of weakness. Patient is a resident at a senior facility and is cared for by a RN and other facility staff. Today, patient appeared increasingly weak and short of breath. The facility staff sent the patient to the ED because of his symptoms. He states that he has been experiencing weakness intermittently for \"a couple years\". He also complains of intermittent dizziness, frequent shortness of breath, a slight cough, and occasional chest pain. He notes a \"small amount of vomiting the other day\" and a bout of diarrhea that lasted about 1 week. Patient complains that he \"feels dehydrated\". He states that he is concerned that he had a stroke a couple weeks ago as well, because his son has been experiencing difficulty understanding him. Patient says that his chronic back pain has continued to be bothersome. Patient denies any fever, nausea, urinary issues, headache, or other associated symptoms. SHx of Pacemaker Placement, Cholecystectomy, and Umbilical Hernia Repair. Patient is currently on Plavix\"   Precautions/Limitations fall precautions   Weight-Bearing Status - LUE weight-bearing as tolerated   Weight-Bearing Status - RUE weight-bearing as tolerated   Weight-Bearing Status - LLE weight-bearing as tolerated   Weight-Bearing Status - RLE weight-bearing as tolerated   General Info Comments Family confirms PLOF. Pocket talker used to help but patient not able to give much information. O2 at 2L, vitally stable at rest   Cognitive Status Examination   Level of Consciousness lethargic/somnolent   Follows Commands and Answers Questions 50% of the time   Pain Assessment   Patient Currently in Pain No  (low back gives him some trouble at times)   Range of Motion (ROM)   ROM Comment Unable to complete steps for AROM. PROM is WFL and was ambulatory up until these worsening status this " "week. Was feeding himself some this morning so observed spontaneous use of UEs but does not follow commands well for MMT/AROM.    Strength   Strength Comments Unable to formally assess strength. See functional mobility for observations   Bed Mobility   Bed Mobility Comments HOB elevated, moves to EOB partially with max A. Unable to scoot self to support sitting at EOB fully. Mod A sitting balance partially to sitting. Then wants to lay down. Max A for sit to supine and max A x 2 to scoot up in bed.    Transfer Skills   Transfer Comments Unable    Gait   Gait Comments UNable   Balance   Balance Comments Poor sitting balance, and unable standing   Sensory Examination   Sensory Perception Comments Not formally tested   General Therapy Interventions   Intervention Comments Progressive activity, TE , transfers and gait as able   Clinical Impression   Criteria for Skilled Therapeutic Intervention yes, treatment indicated   PT Diagnosis generalized weakness   Influenced by the following impairments Clinical exam   Functional limitations due to impairments All mobility, self cares   Clinical Presentation Evolving/Changing   Clinical Presentation Rationale Baseline mobility good, advanced age   Clinical Decision Making (Complexity) Moderate complexity   Therapy Frequency` daily   Predicted Duration of Therapy Intervention (days/wks) 3 days   Anticipated Discharge Disposition Transitional Care Facility   Risk & Benefits of therapy have been explained Yes   Patient, Family & other staff in agreement with plan of care Yes   Clinical Impression Comments Patient quite weak today, worse so than yesterday. Will attempt to assist in mobility progression as he remains in our care   [facility] [employer]-PAC TM \"6 Clicks\"   2016, Trustees of [facility], under license to [employer].  All rights reserved.   6 Clicks Short Forms Basic Mobility Inpatient Short Form   [facility] AM-PAC  \"6 Clicks\" V.2 Basic Mobility " Inpatient Short Form   1. Turning from your back to your side while in a flat bed without using bedrails? 2 - A Lot   2. Moving from lying on your back to sitting on the side of a flat bed without using bedrails? 2 - A Lot   3. Moving to and from a bed to a chair (including a wheelchair)? 1 - Total   4. Standing up from a chair using your arms (e.g., wheelchair, or bedside chair)? 1 - Total   5. To walk in hospital room? 1 - Total   6. Climbing 3-5 steps with a railing? 1 - Total   Basic Mobility Raw Score (Score out of 24.Lower scores equate to lower levels of function) 8   Total Evaluation Time   Total Evaluation Time (Minutes) 20

## 2018-01-11 NOTE — PLAN OF CARE
Problem: Self-Care Deficit (Adult,Obstetrics,Pediatric)  Goal: Identify Related Risk Factors and Signs and Symptoms  Related risk factors and signs and symptoms are identified upon initiation of Human Response Clinical Practice Guideline (CPG).   Outcome: Improving  VSS,  Pt is very weak with any activity.  Denies pain.  Pt is a feeder and needs encouragement.  Sometimes incont of urine.  Review system assessment.  Pt is oriented only to self.

## 2018-01-11 NOTE — CONSULTS
CARE TRANSITION SOCIAL WORK INITIAL ASSESSMENT:  Reason For Consult: discharge planning   Met with: Patient and Family.    DATA  Principal Problem:    Diarrhea  Active Problems:    Diabetes mellitus, type 2 (H)    CKD (chronic kidney disease) stage 4, 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)    Weakness    Coronary artery disease involving native coronary artery - angio in 2006 with multivessel disease managed medically    SOB (shortness of breath)    Hypoxia    Elevated brain natriuretic peptide (BNP) level    Pulmonary hypertension    Elevated troponin       Primary Care Clinic Name: [facility]   Primary Care MD Name: Dr. Sony Berkowitz   Contact information and PCP information verified: Yes      ASSESSMENT  Cognitive Status: awake and alert.             Lives With: alone  Living Arrangements: apartment  Quality Of Family Relationships: supportive, helpful, involved  Description of Support System: Supportive, Involved   Who is your support system?: Children, Other (specify) (home care staff)   Support Assessment: Lacks adequate physical care   Insurance Concerns: No Insurance issues identified        This writer met with patient in his room.  Writer introduced self and role. Patient was resting.  Patient is very hard of hearing, so used the pocket talker.  Patient stated that he does have a home care nurse that comes to see him. Patient lives alone in a Senior apartment, not assisted living.     Writer spoke with son, Panchito, over the phone.  He stated that he has been going to patient's home daily from 8-noon, assisting with meal prep, cooking, med set up and shopping.  Son stated that he visits patient all day on the weekends. Son confirmed that patient has a home care nurse from [facility] that sees patient every other week and and aide that comes 1x per week for bathing and personal cares.  Discussed discharge planning and medicare guidelines in regards to home care and SNF benefits. Son stated that patient was in  short term rehab at the [facility] about 6 months ago. Son requesting referral there.  Referral was sent.     Writer spoke with admissions at the [facility] rehab.  They will assess this patient for possible placement into their unit.  [facility]     Care Transitions will continue to follow for d/c planning.        PLAN    Looking into short term rehab placement.      Discharge Planner   Discharge Plans in progress: yes   Barriers to discharge plan: none at this time  Follow up plan: CTS to follow.        Entered by: TRISTAN BRIGHT 01/11/2018 3:19 PM

## 2018-01-11 NOTE — PLAN OF CARE
Problem: Patient Care Overview  Goal: Plan of Care/Patient Progress Review  Discharge Planner PT   Patient plan for discharge: TCU  Current status: Patient remains nearly dependent for all mobility this AM, mod A for sitting for short period of time near edge of bed but unable to complete any more mobility. He was with decent mobility prior to admission, and hope if things improve medically he will improve in strength.   Patient is a 89 year old year old male admitted for increased weakness.  Prior to admission, patient was living alone in senior apartments without stairs to enter, using walker for mobility, and requiring some assist for ADLs. Currently, patient is requiring max to total assistance for functional mobility and unable to ambulate at time of eval. He will require use of ceiling lift for transfers for time bein.  Barriers to return to previous living situation include level of function currently.   Barriers to return to prior living situation: Level of assist needed, lives alone  Recommendations for discharge: Transitional care with van transport needed  Rationale for recommendations: as above.        Entered by: Dia Leong 01/11/2018 12:51 PM

## 2018-01-11 NOTE — H&P
[facility]    History and Physical  Hospitalist       Date of Admission:  1/10/2018  Date of Service (when I saw the patient): 01/10/18    Assessment & Plan       Active Problems:    Weakness    Assessment: suspected to be secondary to his age, comorbid medical problems and diarrhea.  He has had poor oral intake due to poor appetite.  He denies any CP but has had some SOB and has known CAD that is being managed medically which could also be contributing to his SOB although his EKG is not showing any acute ischemic changes.  His troponin is elevated but coming down and suspect he may have some demand ischemia from having diarrhea but doubt primary cardiac ischemia as his primary problem.   His BNP is elevated but clinically is not showing signs of CHF.    Plan: register to observation, gentle IV fluids, PT evaluation and will likely need short term rehab      Diarrhea    Assessment: is now starting to slow down.  Suspect he may have had a viral enteritis    Plan: no acute intervention but monitor       SOB (shortness of breath)    Assessment: mild and on further questioning weakness explains his symptoms better than SOB    Plan: no acute intervention      Hypoxia    Assessment: mild.  He has pulmonary HTN on his echo which could be contributing. No evidence of pneumonia or clinical suspicion for PE.  BNP is elevated but no edema or rales on his lung exam to suggest CHF.    Plan: oxygen and monitor pulse ox for now      Elevated brain natriuretic peptide (BNP) level    Assessment: as above.  Echo done today with good EF and no description of diastolic CHF    Plan: as above.      Pulmonary hypertension    Assessment: noted     Plan: as above      Elevated troponin    Assessment: as above    Plan: as above.  Continue ASA and beta blocker      Diabetes mellitus, type 2 (H)    Assessment: chronic    Plan: continue lantus, ADA diet and glucose monitoring      CKD (chronic kidney disease)  stage 4, 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)    Assessment: chronic and stable    Plan: follow      Coronary artery disease involving native coronary artery - angio in 2006 with multivessel disease managed medically    Assessment: noted    Plan: as above    DVT Prophylaxis: anticipate less than 24 hour stay  Code Status: DNR    Disposition: Expected discharge in 1 days once PT eval complete, respiratory status stable and plan in place for safe disposition.    Peter Vann MD    Primary Care Physician   Sony Berkowitz    Chief Complaint   Weak    History is obtained from the patient    History of Present Illness   Shaun Gotti is a 89 year old male who presents with weakness.  He has had diarrhea for the past 4 days.  Today he has only had two episodes which is much improved from how much he has had in the previous 3 days.  He denies vomiting but has a poor appetite and has been eating poorly.  He admits to mild SOB but denies CP.  His SOB as he describes it is more a complaint of weakness with activity and inability to move due to weakness.      Past Medical History    I have reviewed this patient's medical history and updated it with pertinent information if needed.   Past Medical History:   Diagnosis Date     Bronchitis, not specified as acute or chronic 09/26/10    Admitted. Lower respiratory infection, severe weakness.     Chronic kidney disease (CKD)      Coronary atherosclerosis of native coronary artery 1/22/2010    Admitted. Syncopal spell and substernal chest pain.     Coronary atherosclerosis of unspecified type of vessel, native or graft      Hernia of unspecified site of abdominal cavity without mention of obstruction or gangrene     left inguinal hernia     Other and unspecified hyperlipidemia      Prostatitis, unspecified     Chronic prostatitis     Type II or unspecified type diabetes mellitus without mention of complication, not stated as uncontrolled      Unspecified essential hypertension        Past  Surgical History   I have reviewed this patient's surgical history and updated it with pertinent information if needed.  Past Surgical History:   Procedure Laterality Date     CARDIAC CATHERIZATION  10/23/2006    [facility]     HC REMOVAL GALLBLADDER      Cholecystectomy     HC S&I FLUORO PACEMAKER       HERNIA REPAIR         Prior to Admission Medications   Prior to Admission Medications   Prescriptions Last Dose Informant Patient Reported? Taking?   AMLODIPINE BESYLATE 10 MG OR TABS 1/10/2018 at 0800  Yes Yes   Si TABLET DAILY   ASPIRIN 81 MG OR TABS 1/10/2018 at 0800  Yes Yes   Si TABLET DAILY   ATORVASTATIN CALCIUM PO 1/10/2018 at 0800  Yes Yes   Sig: Take 20 mg by mouth daily   B Complex-C-Folic Acid (RENAL MULTIVITAMIN FORMULA PO) 1/10/2018 at 0800  Yes Yes   Sig: Take 1 capsule by mouth every morning   CLOPIDOGREL BISULFATE 75 MG OR TABS 1/10/2018 at 0800  No Yes   Si TABLET DAILY   FINASTERIDE 5 MG OR TABS 1/10/2018 at 0800  No Yes   Si TABLET DAILY   METOPROLOL SUCCINATE PO 1/10/2018 at 0800  Yes Yes   Sig: Take 50 mg by mouth daily.   Multiple Vitamins-Minerals (PRESERVISION AREDS 2 PO) 1/10/2018 at 0800  Yes Yes   Sig: Take 2 tablets by mouth daily One in AM; one in PM   NITROGLYCERIN 0.4 MG SL SUBL   Yes No   Si TAB EVERY 5 MIN AS NEEDED FOR CHEST PAIN, UP TO 3 PER EPISODE   OMEPRAZOLE 20 MG OR CPDR 1/10/2018 at 0800  Yes Yes   Si CAPSULE DAILY   ORDER FOR DME   No No   Sig: Equipment being ordered: 4 wheel walker with brakes and seat   ORDER FOR DME   No No   Sig: Equipment being ordered: walker   ORDER FOR DME   No No   Sig: Equipment being ordered: transfer bridge   TAMSULOSIN HCL CAPS 0.4 MG OR 2018 at 2100  Yes Yes   Si CAPSULE DAILY   acetaminophen (TYLENOL) 500 MG tablet 2018 at 2100  Yes Yes   Si TABLET THREE TIMES A DAY. DO NOT EXCEED 4000MG OF ACETAMINOPHEN IN A 24 HOUR PERIOD.   hypromellose (GENTEAL) 0.3 % SOLN 2018 at 2100  Yes Yes   Si  drop 4 times daily   insulin glargine (LANTUS VIAL) 100 UNIT/ML injection 1/9/2018 at 1100  Yes Yes   Sig: Inject 20 Units Subcutaneous every morning    sertraline (ZOLOFT) 25 MG tablet 1/10/2018 at 0800  Yes Yes   Sig: Take 1 tablet (25 mg) by mouth daily      Facility-Administered Medications: None     Allergies   Allergies   Allergen Reactions     Fluoxetine      Lisinopril        Social History   I have reviewed this patient's social history and updated it with pertinent information if needed. Shaun Gotti  reports that he has never smoked. He has never used smokeless tobacco. He reports that he does not drink alcohol or use illicit drugs.    Family History   I have reviewed this patient's family history and updated it with pertinent information if needed.   Family History   Problem Relation Age of Onset     CANCER Father      prostate cancer       Review of Systems   The 10 point Review of Systems is negative other than noted in the HPI or here.     Physical Exam   Temp: 98.4  F (36.9  C) Temp src: Oral BP: 171/79 Pulse: 86 Heart Rate: 81 Resp: 20 SpO2: 95 % O2 Device: Nasal cannula Oxygen Delivery: 2 LPM  Vital Signs with Ranges  Temp:  [98.3  F (36.8  C)-98.4  F (36.9  C)] 98.4  F (36.9  C)  Pulse:  [83-90] 86  Heart Rate:  [75-86] 81  Resp:  [11-21] 20  BP: ()/(69-92) 171/79  SpO2:  [87 %-97 %] 95 %  156 lbs 15.48 oz    Constitutional:   awake, alert, cooperative, no apparent distress, and appears stated age     Eyes:   Lids and lashes normal, pupils equal, round and reactive to light, extra ocular muscles intact, sclera clear, conjunctiva normal     ENT:   Normocephalic, without obvious abnormality, atraumatic, sinuses nontender on palpation, external ears without lesions, oral pharynx with moist mucous membranes, tonsils without erythema or exudates, gums normal and good dentition.     Neck:   Supple, symmetrical, trachea midline, no adenopathy, thyroid symmetric, not enlarged and no tenderness,  skin normal     Hematologic / Lymphatic:   no cervical lymphadenopathy and no supraclavicular lymphadenopathy     Back:   Symmetric, no curvature, spinous processes are non-tender on palpation, paraspinous muscles are non-tender on palpation, no costal vertebral tenderness     Lungs:   No increased work of breathing, good air exchange, clear to auscultation bilaterally, no crackles or wheezing     Cardiovascular:   Normal apical impulse, regular rate and rhythm, normal S1 and S2, no S3 or S4, and no murmur noted     Abdomen:   normal bowel sounds, soft, non-distended, non-tender, no masses palpated, no hepatosplenomegally     Neurologic:   Awake, alert, oriented to name, place and time.  Cranial nerves II-XII are grossly intact.  Motor is 5 out of 5 bilaterally.   Sensory is intact.         Data   Data reviewed today:  I personally reviewed the EKG tracing showing no acute ischemic changes.    Recent Labs  Lab 01/10/18  1620 01/10/18  1225   WBC  --  6.9   HGB  --  11.6*   MCV  --  95   PLT  --  156   NA  --  142   POTASSIUM  --  3.9   CHLORIDE  --  108   CO2  --  27   BUN  --  40*   CR  --  2.50*   ANIONGAP  --  7   RAHEL  --  9.1   GLC  --  100*   ALBUMIN  --  3.3*   PROTTOTAL  --  7.6   BILITOTAL  --  0.8   ALKPHOS  --  95   ALT  --  22   AST  --  21   TROPI 0.154* 0.183*       Recent Results (from the past 24 hour(s))   XR Chest 2 Views    Narrative    CHEST TWO VIEWS   1/10/2018 12:51 PM     HISTORY: Shortness of breath.    COMPARISON: Chest x-rays dated 9/21/2017 and 3/26/2013.    FINDINGS:  Dual channel pacemaker and leads are stable in appearance.  There is mild vascular congestion, new or increased since the most  recent prior study. Cardiac silhouette is within normal limits for  size. No pneumothorax is identified. There are small bilateral pleural  fluid collections. No fracture is seen. There is diffuse osteopenia.      Impression    IMPRESSION:  1. Findings concerning for mild congestive heart failure  given the  pleural fluid collections and mild vascular congestion. No definite  cardiomegaly, however, is seen.  2. Otherwise stable chest x-rays.  3. I recommend appropriate clinical treatment and followup chest  x-rays after resolution of current symptoms to ensure resolution of  the chest x-ray findings.    FELICITY DOOLEY MD   Head CT w/o contrast    Narrative    CT SCAN OF THE HEAD WITHOUT CONTRAST   1/10/2018 2:09 PM     HISTORY: Possible stroke-like symptoms.    TECHNIQUE: 4 mm thick axial images of the head without IV contrast  material. Radiation dose for this scan was reduced using automated  exposure control, adjustment of the mA and/or kV according to patient  size, or iterative reconstruction technique.    COMPARISON: 1/22/2010    FINDINGS:  There is generalized atrophy of the brain.  Areas of low  attenuation are present in the white matter of the cerebral  hemispheres that are consistent with small vessel ischemic disease in  this age patient.  There is no evidence of intracranial hemorrhage,  mass, acute infarct or anomaly. The visualized portions of the sinuses  and mastoids appear normal. Bilateral ocular lens replacements are  noted. There is no evidence of trauma.       Impression    IMPRESSION:  1. Worsening of age-related atrophy and small vessel white matter  ischemic changes.  2. No acute intracranial hemorrhage.    I discussed these findings with Dr. Salter on 1/10/2018 at  approximately 2:20 PM.    FELICITY DOOLEY MD

## 2018-01-11 NOTE — PROGRESS NOTES
[facility] Progress Note          Assessment and Plan:   Assessment:   Principal Problem:    Diarrhea    Assessment:   initially what brought patient into the emergency room, however patient has had no significant diarrhea since this hospitalization.  He does continue to have minimal oral intake and there is increased difficulty with swallowing noted.  He also has new dysarthria and increased confusion as noted no further treatment is needed.    Plan:  will transition patient inpatient status and proceed with workup as noted below.  Will continue to monitor for diarrhea, however at this time       Decreased oral intake    Assessment: Patient has had minimal oral intake overnight despite encouragement.  Now is having increased confusion and more difficulty in swallowing.      Plan: Will admit patient and continue with IV fluids, proceed with swallow evaluation.  Did have prolonged conversation with patient's 2 children regarding the worsening symptoms seen today with new dysarthria in the setting of ongoing suggestive dysphasia did discuss MRI, however patient would be intolerant of the exam, and when discussing.  And goals and outcomes for the patient, it is unclear that that would  plan going forward.  We will continue to encourage oral intake and monitor patient's mental status and neurological function closely going forward.  A care conference is set up for tomorrow to discuss how the patient has gone.  If patient continues to decline, may need to consider transitioning to comfort care at that time, and the 2 children present today are aware      Dysphagia    Assessment: noted slightly at home, however significant  per nursing staff    Plan:   proceed with swallow evaluation and any recommendation regarding his diet going forward      Dysarthria    Assessment: Significant, not present at time of admission.  Patient has remained vitally stable without any evidence of infection.  Patient is  more confused today and is unable to reliably follow instructions, however given the worsening, there is concern for possible stroke.  Patient did have a CT scan of the head that was unremarkable yesterday     Plan:  did discuss performing an MRI with patient's children today, however patient would be intolerant of the exam .  On further discussion, patient's health has been declining over time and family has been aware he would not be able to live independently for much longer even if his health remained as it was a week ago.  They are not sure how aggressive the patient would want to be going forward unless the and prognosis was significantly better than his previous baseline will have care conference tomorrow with more family present to discuss.  In detail patient's progress over the next 24 hours as well as goals going forward.  Will not perform further imaging at this time but continue to monitor patient closely.      Confusion    Assessment:   Not present initially, but very pronounced today.  Per family patient has not had significant confusion, but does have memory impairment and at times seems to have some hallucinations intermittently.  They state that he is much more confused today than he is at baseline But that it is difficult to assess given his dysarthria.      Plan: Proceed with plan as above      Weakness    Assessment: Significant and worsening    Plan:  proceed with plan as above      SOB (shortness of breath)    Assessment: Patient denies any shortness of breath currently, but still requires 2 L nasal cannula to maintain saturations    Plan: Continue with O2 supplementation.        Hypoxia    Assessment: Mild and ongoing without obvious etiology identified on workup performed to date continue with O2 supplementation    Plan:        Elevated troponin    Assessment:  trending downward, with no evidence of wall motion abnormality on echocardiogram     Plan: continue to trend  and monitor closely for  signs or symptoms concerning for ischemia       Diabetes mellitus, type 2 (H)    Assessment: Blood sugars have been stable    Plan: Continue with current plan and monitor.  If oral intake continues to be very poor, would transition to q. 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)    Assessment: Chronic and stable    Plan: Continue to monitor      Coronary artery disease involving native coronary artery - angio in 2006 with multivessel disease managed medically    Assessment: No evidence of acute angina with echocardiogram showing no acute wall motion abnormality    Plan: Continue with current regimen and monitor      Elevated brain natriuretic peptide (BNP) level    Assessment: BNP was 21,000, of unclear significance this patient has no evidence of CHF exacerbation    Plan: Continue with cares as above      Pulmonary hypertension    Assessment: Noted on echocardiogram    Plan: Continue with O2 support as above      Advance directives, counseling/discussion    Assessment:   Discuss further with family regarding wishes going forward and need to confirm patient should be DNR/DNI    Plan:   We will transition patient to DNR/DNI status.       VTE:  SCDs  Code Status:  DNR/DNI        Interval History:   Appears worse today.  Vitals overall stable with ongoing HTN but patient less alert and much more difficulty to understand when he tries to talk.  Patient is not oriented to place or time and is not able to reorient easily.  Not responding well to interventions and the treatment plan.  New concerns include increasing confusion, worsening dysphagia and new dysarthria this morning compared to last night, ongoing hypoxia, etc            Significant Problems:     Past Medical History:   Diagnosis Date     Bronchitis, not specified as acute or chronic 09/26/10    Admitted. Lower respiratory infection, severe weakness.     Chronic kidney disease (CKD)      Coronary atherosclerosis of native  "coronary artery 1/22/2010    Admitted. Syncopal spell and substernal chest pain.     Coronary atherosclerosis of unspecified type of vessel, native or graft      Hernia of unspecified site of abdominal cavity without mention of obstruction or gangrene     left inguinal hernia     Other and unspecified hyperlipidemia      Prostatitis, unspecified     Chronic prostatitis     Type II or unspecified type diabetes mellitus without mention of complication, not stated as uncontrolled      Unspecified essential hypertension             Physical Exam:   Blood pressure 171/70, pulse 100, temperature 98.2  F (36.8  C), resp. rate 20, height 1.778 m (5' 10\"), weight 71.2 kg (156 lb 15.5 oz), SpO2 90 %.  Constitutional:   Awake, alert but very hard to understand when he attempts to talk with disorientation to place and time, no acute distress     Lungs:   No increased work of breathing, decreased air exchange but difficult to get patient to take deep breaths, clear to auscultation bilaterally, no crackles or wheezing     Cardiovascular:   Normal apical impulse, regular rate and rhythm, normal S1 and S2, no S3 or S4, and no murmur noted     Abdomen:   Bowel sounds present, abdomen soft without apparent tenderness     Musculoskeletal:   no lower extremity pitting edema present     Neurologic:   Awake and alert with obvious dysarthria and very difficult to understand - a notable difference from yesterday.  Patient having difficulty following commands so very challenging to perform further neurological examination but no obvious facial droop and patient is able to lift arms and legs against gravity     Skin:   normal skin color, texture, turgor           Data:   All laboratory data reviewed    Attestation:  I have reviewed today's vital signs, notes, medications, labs and imaging.     More than 40 minutes was spent on evaluation of the patient and discussion with family regarding the options going forward.      Electronically " Signed:  Chika De La Vega MD    Note: Chart documentation done in part with Dragon Voice Recognition software. Although reviewed after completion, some word and grammatical errors may remain.

## 2018-01-11 NOTE — PROGRESS NOTES
S-(situation): Patient changed to inpatient status    B-(background): Patient status change due to observation goals not being met. Goals not being met    A-(assessment): Please review system assessment and VS.  Denies pain, very weak and need help eating.    R-(recommendations):  Cont new POC as ordered. Will monitor patient per MD orders. Physician review with family      Inpatient nursing criteria listed below were met:    Adult Profile completedYes  Health care directives status obtained and documented: Yes  Core Measures assessed (Stroke/TIA, Acute MI, Pneumonia and SSI):     VTE prophylaxis orders: yes  (FYI: Asprin is not an approved anticoagulation for DVT prophylaxis)  SCD's Documented: Yes  Vaccine assessment done and vaccines ordered if appropriate. Yes  MRSA swab completed for patient 55 years and older (exclude TAMIKO and TKA): NA  My Chart patient sign up addressed and documented: No  Care Plan initiated: Yes  Discharge planning review completed (admission navigator) Yes

## 2018-01-11 NOTE — PROGRESS NOTES
SPIRITUAL HEALTH SERVICES  SPIRITUAL ASSESSMENT Progress Note  [facility]      Pt request - Pt indicated he was making progress and welcomed a prayer, which  provided.   is available for pt/family needs.    Rahul Cervantes M.Div., [facility]  Staff   Office tel: [phone]

## 2018-01-11 NOTE — PROGRESS NOTES
01/11/18 1000   General Information   Onset Date 01/10/18   Referring Physician Dr. De La Vega   Swallowing Evaluation Bedside swallow evaluation   Behaviorial Observations Lethargic;Confused;Other (Comment)  (Patient has severe hearing loss and is legally blind.)   Mode of current nutrition Oral diet   Type of oral diet Regular;Thin liquid;Other (Comment)  (Patient's son reports coughing on thin liquids at home)   Respiratory Status O2 Supply   Type of O2 supply Nasal cannula   Comments Patient is a 89 year old male who was admitted for weakness. Patient referred for a clinical swallow study due to coughing on liquids. Patient is legally blind and has a severe hearing loss. Patient lives alone, son comes over daily to check on father. Patient's son and daughter were present during today's evaluation. Patient's son reports Shaun does well with food, but coughs on liquid a couple of times a week. Patient has not pneumonia in the past 6 months.   Clinical Swallow Evaluation   Dentition other (see comments);upper dentures;lower dentures  (Dentures not in for today's evaluation d/t confusion)   Mucosal Quality good   Oral Labial Strength and Mobility other (see comments)  (slowed)   Lingual Strength and Mobility other (see comments)  (slowed, weak)   Clinical Swallow Eval: Thin Liquid Texture Trial   Mode of Presentation, Thin Liquids cup;fed by clinician   Volume of Liquid or Food Presented sip   Oral Phase of Swallow other (see comments)  (Pt coughed immediately, spit liquid out)   Pharyngeal Phase of Swallow coughing/choking   Clinical Swallow Eval: Nectar Thick Liquid Texture Trial   Mode of Presentation, Nectar spoon;cup;straw;fed by clinician   Volume of Nectar Presented 8 sips   Oral Phase, Nectar other (see comments);Poor AP movement  (slowed oral transit)   Pharyngeal Phase, Nectar intact   Clinical Swallow Eval: Puree Solid Texture Trial   Mode of Presentation, Puree spoon;fed by clinician   Volume of Puree  Presented 4 tsp   Oral Phase, Puree Poor AP movement;other (see comments)  (slowed)   Pharyngeal Phase, Puree intact   Successful Strategies Trialed During Procedure, Puree other (see comments)  (small bites)   Swallow Compensations   Swallow Compensations Reduce amounts;Pacing   Esophageal Phase of Swallow   Patient reports or presents with symptoms of esophageal dysphagia No   Swallow Eval: Clinical Impressions   Skilled Criteria for Therapy Intervention Other (see comments)  (Modify diet at this time, SLP to monitor status and advance diet as patient tolerates )   Functional Assessment Scale (FAS) 3   Dysphagia Outcome Severity Scale (ANTON) Level 3 - ANTON   Treatment Diagnosis Moderate Oropharyngeal Dysphagia   Diet texture recommendations Nectar thick liquids;Dysphagia diet level 2   Recommended Feeding/Eating Techniques alternate between small bites and sips of food/liquid;maintain upright posture during/after eating for 30 mins;small sips/bites   Demonstrates Need for Referral to Another Service physical therapy   Therapy Frequency other (see comments)   Risks and Benefits of Treatment have been explained. Yes   Patient, family and/or staff in agreement with Plan of Care Yes   Clinical Impression Comments Due to confusion and weakness, patient was fed by SLP throughout evaluation. Recommend 1.) Co-feeder during mealtines, 2.) Nectar thick liquids 3.) Mechanical Soft solids 4.) Consider modified barium swallow study was clinical status improves to formally asses pharyngeal phase of the swallow.   Total Evaluation Time   Total Evaluation Time (Minutes) 25     Nirmala Villarreal MA, CCC-SLP

## 2018-01-11 NOTE — PROGRESS NOTES
S-(situation): Patient registered to Observation. Patient arrived to room 251 via cart from ED    B-(background): weakness    A-(assessment): BP elevated at 171/79, afebrile, O2 sats 87% on room air-93% on 2L/NC. Pt is alert to self only but pleasant and cooperative. No skin issues. Pt is legally blind and [place]. Pt does not have his dentures in. Son will bring dentures and hearing aid tomorrow. Pt passed bed side swallow, but family reports trouble swallowing food and liquids at home. Will recommend a speech swallow study.     R-(recommendations): Orders and observation goals reviewed with pt and daughter      Nursing Observation criteria listed below was met:    Skin issues/needs documented:NA  Isolation needs addressed, if appropriate: NA  Fall Prevention: Education given and documented: Yes  Education Assessment documented:Yes  Education Documented (Pre-existing chronic infection such as, MRSA/VRE need education on admission): Yes  New medication patient education completed and documented (Possible Side Effects of Common Medications handout): Yes  Home medications if not able to send immediately home with family stored here: NA  Reminder note placed in discharge instructions: NA  Patient has discharge needs (If yes, please explain): Yes

## 2018-01-11 NOTE — PROGRESS NOTES
S-(situation): end of shift note    B-(background): weakness    A-(assessment): pt is oriented to self only. Is cooperative and follows commands, pleasant but needs much redirection. Was too weak to stand at edge of bed with assist of two and gait belt with walker. Incontinent of urine x1 and voided x1. Using 2L nc with O2 sats mid 90s. Lungs are clear.     R-(recommendations): redirect and reorient as needed. Monitor vs, labs, I and O. PT eval, swallow eval.

## 2018-01-11 NOTE — PROGRESS NOTES
Family report patient coughing and spitting out drinks at home.  He passed bed side swallow eval here but can have speech therapy evaluate.  electronically signed by Peter Vann M.D.

## 2018-01-11 NOTE — PLAN OF CARE
Problem: Patient Care Overview  Goal: Plan of Care/Patient Progress Review  Discharge Planner SLP     Patient plan for discharge: TBD    Current status: Diet and Feeding Recommendations: 1.) Nectar liquids 2.) Mechanical Soft solids 3.) Co-feeder present at mealtimes 4.) Straw okay with supervision 5.) Small bites and sips 6.) Upright posture during and 30 minutes after meals.    Feeding History: Patient is on a regular diet at home. Patient's son reports Shaun occasionally coughs on liquids at home. Son reports no history of pneumonia in the past 6 months.    Barriers to return to prior living situation: Ability to manage diet recommendations    Recommendations for discharge: Modified diet and safe swallow strategies. Discussed a modified barium swallow study in the future once clinical status has improved.    Rationale for recommendations: Difficulty with advanced diet.       Entered by: Nirmala Villarreal 01/11/2018 12:02 PM     Nirmala Villarreal MA, CCC-SLP

## 2018-01-12 NOTE — DISCHARGE SUMMARY
[facility] Discharge Summary    Shanu Gotti MRN# [id number]   Age: 89 year old YOB: 1925     Date of Admission:  1/10/2018  Date of Discharge::  1/12/2018  Admitting Physician:  Chika De La Vega MD  Discharge Physician:  Chika De La Vega MD    Home clinic: [facility]          Admission Diagnoses:   Confusion [R41.0]  Weakness [R53.1]  Elevated brain natriuretic peptide (BNP) level [R79.89]          Discharge Diagnosis:     Weakness    Assessment: may be secondary to diarrheal illness, versus deconditioning versus other.  Patient did have an episode during this hospitalization of severe profound lethargy and weakness associated with dysarthria, dysphasia, and confusion of unclear etiology.  This lasted approximately 24 hours and then suddenly cleared and patient was back to baseline weakness that he was when he presented.  No infection or cardiac cause has been identified    Plan:   [facility]      Hypoxia    Assessment: unclear etiology but patient continues to have mild hypoxia during this stay    Plan: will discharge on oxygen, may be secondary to worsening pulmonary hypertension      Elevated troponin    Assessment: mildly elevated but with ECHO unchanged and patient asymptomatic, trending downward.    Plan: no routine monitoring needed       Decreased oral intake    Assessment: unclear etiology but patient is having improved oral intake today and tolerating dysphagia diet without difficulty    Plan: proceed with discharge to rehab as above      Dysphagia    Assessment: etiology unclear and may be transient in nature associated with episode of decreased responsiveness and confusion but tolerating dysphagia diet without difficulty    Plan: ongoing speech therapy at rehab      Dysarthria    Assessment: etiology unclear and may be transient in nature associated with episode of decreased responsiveness and confusion but speech very clear  today    Plan: ongoing speech therapy at rehab      Diarrhea    Assessment: reported in the days prior to admission but none during this hospital stay    Plan: no further assessment needed      Diabetes mellitus, type 2 (H)    Assessment: stable    Plan: discharge without change      CKD (chronic kidney disease) stage 4, 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)    Assessment: stable    Plan: discharge without change      Coronary artery disease involving native coronary artery - angio in 2006 with multivessel disease managed medically    Assessment: stable without evidence of angina or acute incident with Echo showing no wall motion abnormalities     Plan: discharge without change to home regimen      Elevated brain natriuretic peptide (BNP) level    Assessment: BNP was 42889 on initial presentation for unclear etiology without CHF found on echo    Plan: no further treatment is needed      Pulmonary hypertension    Assessment: noted on echo    Plan: continue with O2 supplementation at time of discharge      Advanced directives, counseling/discussion    Assessment: changed to DNR/DNI    Plan: continue at time of discharge          Procedures:   No procedures performed during this admission          Medications Prior to Admission:     Prescriptions Prior to Admission   Medication Sig Dispense Refill Last Dose     Multiple Vitamins-Minerals (PRESERVISION AREDS 2 PO) Take 2 tablets by mouth daily One in AM; one in PM   1/10/2018 at 0800     B Complex-C-Folic Acid (RENAL MULTIVITAMIN FORMULA PO) Take 1 capsule by mouth every morning   1/10/2018 at 0800     hypromellose (GENTEAL) 0.3 % SOLN 1 drop 4 times daily   1/9/2018 at 2100     ORDER FOR DME Equipment being ordered: walker 1 Device 0 9/21/2017 at Unknown time     ORDER FOR DME Equipment being ordered: transfer bridge 1 Device 0 9/21/2017 at Unknown time     ORDER FOR DME Equipment being ordered: 4 wheel walker with brakes and seat 1 Device 0 9/21/2017 at Unknown time     [DISCONTINUED]  acetaminophen (TYLENOL) 500 MG tablet 1 TABLET THREE TIMES A DAY. DO NOT EXCEED 4000MG OF ACETAMINOPHEN IN A 24 HOUR PERIOD.   1/9/2018 at 2100             Discharge Medications:     Current Discharge Medication List      START taking these medications    Details   !! order for DME Equipment being ordered: Oxygen - patient needs 1L NC oxygen continuously during transportation to rehab facility.  Qty: 1 Device, Refills: 0    Associated Diagnoses: Hypoxia; Pulmonary hypertension       !! - Potential duplicate medications found. Please discuss with provider.      CONTINUE these medications which have CHANGED    Details   acetaminophen (TYLENOL) 325 MG tablet Take 2 tablets (650 mg) by mouth every 4 hours as needed for mild pain  Qty: 100 tablet    Associated Diagnoses: Pain      sertraline (ZOLOFT) 25 MG tablet Take 1 tablet (25 mg) by mouth daily  Qty: 30 tablet    Associated Diagnoses: Major depression in complete remission (H)      nitroGLYcerin (NITROSTAT) 0.4 MG sublingual tablet 1 TAB EVERY 5 MIN AS NEEDED FOR CHEST PAIN, UP TO 3 PER EPISODE  Qty: 25 tablet    Associated Diagnoses: Atherosclerosis of coronary artery of native heart, angina presence unspecified, unspecified vessel or lesion type      aspirin 81 MG tablet Take 1 tablet (81 mg) by mouth daily  Qty: 30 tablet    Associated Diagnoses: Atherosclerosis of coronary artery of native heart, angina presence unspecified, unspecified vessel or lesion type; Type 2 diabetes mellitus with stage 4 chronic kidney disease, with long-term current use of insulin (H)      insulin glargine (LANTUS) 100 UNIT/ML injection Inject 8 Units Subcutaneous every morning    Associated Diagnoses: Type 2 diabetes mellitus with stage 4 chronic kidney disease, with long-term current use of insulin (H)      atorvastatin (LIPITOR) 20 MG tablet Take 1 tablet (20 mg) by mouth daily  Qty: 30 tablet    Associated Diagnoses: Atherosclerosis of coronary artery of native heart, angina  presence unspecified, unspecified vessel or lesion type      metoprolol succinate (TOPROL-XL) 50 MG 24 hr tablet Take 1 tablet (50 mg) by mouth daily  Qty: 30 tablet    Associated Diagnoses: Atherosclerosis of coronary artery of native heart, angina presence unspecified, unspecified vessel or lesion type      amLODIPine (NORVASC) 10 MG tablet Take 1 tablet (10 mg) by mouth daily  Qty: 30 tablet    Associated Diagnoses: Essential hypertension, benign      clopidogrel (PLAVIX) 75 MG tablet 1 TABLET DAILY  Qty: 30 tablet    Associated Diagnoses: Atherosclerosis of coronary artery of native heart, angina presence unspecified, unspecified vessel or lesion type      omeprazole (PRILOSEC) 20 MG CR capsule 1 CAPSULE DAILY  Qty: 30 capsule    Associated Diagnoses: Gastroesophageal reflux disease without esophagitis      finasteride (PROSCAR) 5 MG tablet Take 1 tablet (5 mg) by mouth daily  Qty: 30 tablet, Refills: 0    Associated Diagnoses: Benign prostatic hyperplasia, unspecified whether lower urinary tract symptoms present      tamsulosin (FLOMAX) 0.4 MG capsule Take 1 capsule (0.4 mg) by mouth At Bedtime  Qty: 60 capsule    Associated Diagnoses: Benign prostatic hyperplasia, unspecified whether lower urinary tract symptoms present         CONTINUE these medications which have NOT CHANGED    Details   Multiple Vitamins-Minerals (PRESERVISION AREDS 2 PO) Take 2 tablets by mouth daily One in AM; one in PM      B Complex-C-Folic Acid (RENAL MULTIVITAMIN FORMULA PO) Take 1 capsule by mouth every morning      hypromellose (GENTEAL) 0.3 % SOLN 1 drop 4 times daily      !! ORDER FOR DME Equipment being ordered: walker  Qty: 1 Device, Refills: 0    Associated Diagnoses: Sepsis(995.91); Falls frequently      !! ORDER FOR DME Equipment being ordered: transfer bridge  Qty: 1 Device, Refills: 0    Associated Diagnoses: Sepsis(995.91); Falls frequently      !! ORDER FOR DME Equipment being ordered: 4 wheel walker with brakes and  seat  Qty: 1 Device, Refills: 0    Associated Diagnoses: Low back pain; Fall, initial encounter       !! - Potential duplicate medications found. Please discuss with provider.                Consultations:   No consultations were requested during this admission          Brief History of Illness:   Patient is a 89-year-old gentleman who presented to the emergency room with weakness, diarrhea, and vague numerous complaints of unclear etiology.  CT scan of the head was performed secondary to concern for confusion worsening suddently 4 days prior from the family without any stroke identified.  Troponin was elevated and BNP was very elevated, however echocardiogram was unchanged from previous and patient had no evidence of CHF exacerbation.  He did report a story of intermittent diarrhea for the past few days with poor oral intake and there was concern for possible confusion secondary to this.  Decision was made to observe the patient overnight and have physical therapy assess for appropriate disposition plan, as well as monitor for any other insight as to the underlying etiology.          Hospital Course:   In the first 24 hours patient had acute worsening with significant lethargy, dysarthria, dysphasia, and increased confusion.  There was concern for acute stroke however in discussion with family given his age and overall condition, MRI was not performed due to fear for patient intolerance and the fact that it would not significantly change medical management, especially as he was on aspirin and Plavix already.  Patient was admitted to an inpatient status and evaluated by speech therapy, physical therapy, and occupational therapy.  with sodium resolving miraculously the patient within the next 24 hours seem to completely recover and is back to the baseline he was when he presented.  He does continue to have some weakness.  He has been discharged to rehab for ongoing strengthening as well as medical monitoring          Physical Exam:   Vitals were reviewed  Constitutional:   awake, alert, cooperative, no apparent distress, and appears stated age     Lungs:   No increased work of breathing, good air exchange, clear to auscultation bilaterally, no crackles or wheezing     Cardiovascular:   Normal apical impulse, regular rate and rhythm, normal S1 and S2, no S3 or S4, and no murmur noted     Abdomen:   Bowel sounds present, abdomen soft and non-tender     Musculoskeletal:   no lower extremity pitting edema present  there is no redness, warmth, or swelling of the joints     Neurologic:   Awake, alert, oriented to name, place and time.  Doesn't remember coming to the hospital or the events of yesterday but does remember what he had for breakfast this morning and the morning round team all coming in     Skin:   normal skin color, texture, turgor             Discharge Instructions and Follow-Up:   Discharge diet: Dysphagia Diet level II with Nectar thickened liquids   Discharge activity: Activity as tolerated with nursing assistance   Discharge follow-up: Follow up with rehab provider or [facility] care [facility] provider within one week           Discharge Disposition:   Discharged to [facility] rehabilitation facility      Attestation:  I have reviewed today's vital signs, notes, medications, labs and imaging.    More than 30 minutes was spent on this discharge.      Chika De La Vega MD    Note: Chart documentation done in part with Dragon Voice Recognition software. Although reviewed after completion, some word and grammatical errors may remain.

## 2018-01-12 NOTE — PLAN OF CARE
Problem: Self-Care Deficit (Adult,Obstetrics,Pediatric)  Goal: Identify Related Risk Factors and Signs and Symptoms  Related risk factors and signs and symptoms are identified upon initiation of Human [employer] Clinical Practice Guideline (CPG).   Outcome: No Change  Remains very weak and appears confused. Very restless and attempting to climb out of bed. Used urinal plus was incontinent of urine. Was able to settle in bed after voiding.

## 2018-01-12 NOTE — PLAN OF CARE
Problem: Patient Care Overview  Goal: Plan of Care/Patient Progress Review  Outcome: No Change  [facility] and gave report to Ramonita PARKS.

## 2018-01-12 NOTE — PROGRESS NOTES
Admissions at the [facility] have called and stated that they have a rehab bed available for this patient today.  They do not accept admissions on the weekend and will not on Monday, as is is a holiday (Dva Das Jr. day).

## 2018-01-12 NOTE — PLAN OF CARE
Problem: Patient Care Overview  Goal: Plan of Care/Patient Progress Review  Discharge Planner PT   Patient plan for discharge: Pt wants to go home.  Current status:   Goal status:bed mobility: did not assess pt was in the chair.  Transfers: completed with CGA and verbal cueing using FWW.  PT needed to hold onto walker to avoid from tipping.  If chair was higher, pt would be able to complete easily.  Gait: Ambulated with CGA and FWW for 3 ft forward and backwards.  O2 levels with 1L 93% at rest, without O2 90% at rest.  Once pt start moving with 2L O2 sats drops to about 86-88% with and without O2.  Functional status: Pt is demonstrating improvement with mobility and transfers.  Areas of progress:Endurance and activity tolerance.  Equipment needs: None at this time.  Transport recommendations: Did not discuss this today.   Pt is moving better and family may be able to provide transportation needs.  Barriers to return to prior living situation: Pt is actually demonstrating improved mobility.  He is improved since yesterday.  Pt is requiring less assistance today.  May be able to return home with home care services.  Recommendations for discharge: At evaluation recommendation was TCU.  However pt has demonstrated great improvements and may be able to return home with home care services.  As long as family has been able to provide assistance and 24 hour support.  [facility] and [facility] [place]  Rationale for recommendations: Improvement with overall functional mobility with transfers and able to walk today.      Pt and family will be having care conference today.       Entered by: Basilia Barney 01/12/2018 10:17 AM     Thank you for your referral.    Basilia Barney, PT, DPT  [facility]  [phone]      Physical Therapy Discharge Summary    Reason for therapy discharge:    Discharged to transitional care facility.    Progress towards therapy goal(s). See goals on Care Plan in  [facility] electronic health record for goal details.  Goals partially met.  Barriers to achieving goals:   discharge from facility.    Therapy recommendation(s):    Continued therapy is recommended.  Rationale/Recommendations:  strength, endurance, mobility.     Thank you for your referral.    Basilia Barney, PT, DPT  [facility]  [phone]

## 2018-01-12 NOTE — PLAN OF CARE
Problem: Patient Care Overview  Goal: Plan of Care/Patient Progress Review  OT:  Orders received, evaluation and treatment completed.  Patient lives in an independent senior apartment with assist daily for higher order IADL's such as meal prep, med management and housekeeping.  Patient has tub with bench and grab bars, however get assistance x1/week for bathing.  Currently, patient is CGA in functional transfers including sit<>stand off bed and chair.  Mod A in LE dressing and min A in UB dressing. Patient needing repetition of directions and cues on safety throughout session, may be due in part to hearing impairment. Defer equipment needs to TCU.  Recommend TCU at discharge to progress towards baseline independence in ADL's.     Rosalia CAMPBELL/L      Occupational Therapy Discharge Summary    Reason for therapy discharge:    Discharged to transitional care facility.    Progress towards therapy goal(s). See goals on Care Plan in [facility] electronic health record for goal details.  Goals met    Therapy recommendation(s):    Continued therapy is recommended.  Rationale/Recommendations:  To progress independence in ADL's.  .

## 2018-01-12 NOTE — PLAN OF CARE
Problem: Patient Care Overview  Goal: Plan of Care/Patient Progress Review  Outcome: Adequate for Discharge Date Met: 01/12/18  S-(situation): Patient discharged to [facility] via w/c with daughter and son    B-(background): weakness, confusion    A-(assessment): VSS< afebrile.  On 1l/nc to maintain sats above 92%.  Up with assist of 1 and walker.  Mechanical soft diet and nectar thick - tolerating with assist with feeding for breakfast and set up only for lunch - ate 25% each meal.  Swallowed pills whole with no difficulty. Pleasantly confused needs frequent reminders.  Last bowel movement: 1/10/18     R-(recommendations):Report called to [facility] at [facility]. Listed belongings gathered and sent with patient.     Discharge Nursing Criteria:     Care Plan and Patient education resolved: Yes    Core Measures   Core Measures applicable during stay (Stroke/TIA, MI, Pneumonia and SSI): stroke    Vaccines  Pneumonia Vaccine verified at discharge: Yes  Influenza status verified at discharge:  Yes    MISC  Home and hospital aquired medications returned to patient: Yes - eye drops  Medication Bin checked and emptied on discharge Yes  All paperwork sent with patient/Copy of AVS given to patient or family Yes.

## 2018-01-12 NOTE — PLAN OF CARE
Problem: Patient Care Overview  Goal: Plan of Care/Patient Progress Review  Outcome: Adequate for Discharge Date Met: 01/12/18  S-(situation): note    B-(background): weakness     A-(assessment): Patient up with assist of 1 and walker-steady gait  Speech clear and confused to situation and place, follows directions and understands.   Patient requested pills whole, swallowed with no difficulty.  Ate 25% of mechanical soft diet- tolerated.  Drinking nectar thick liquids, changed to SL.   Tried to wean off o2-- o2 sats decreased 86-92% on RA, now on 1l/nc to maintain sats above 92%.  LS clear t/o.    R-(recommendations): monitor

## 2018-01-12 NOTE — PLAN OF CARE
Problem: Patient Care Overview  Goal: Plan of Care/Patient Progress Review  Outcome: No Change  Pt is alert, oriented to self only. Verbally made choices when giving tylenol during the night as to what to give with it- applesauce versus pudding, pt then asked for water. Is cooperative, needs frequent reorientation. Feels hot to touch, temp max 99.4 (axillary) as pt at times does not close thermometer in mouth well even with assistance. Tylenol crushed and given with applesauce. Nectar thick water given and pt tolerated well. O2 at 3L nc desats to mid 80s, and pt is mouth breather when sleeping so oxymask applied. oxymask at 4L on early in the night and at this time with O2 merlin low- mid 90s. Lungs are clear. Pt has voided x3 with urinal while laying in bed. Denies pain, but does withdraw R arm when checking blood pressure and at times with repositioning. Will continue with plan of care- monitor vs, labs, I and O, strength.

## 2018-01-12 NOTE — PROGRESS NOTES
SPIRITUAL HEALTH SERVICES  SPIRITUAL ASSESSMENT Progress Note  [facility]      Family Care Conference - Three family members were physically present and another family member was on speaker phone.  Hospitalist discussed pt's condition and suggested pt going to rehab at the [facility] would be the optimal situation.  Pt and family agreed.  Following the Conference,  provided a prayer.  No follow up is needed.    Rahul Cervantes M.Div., [facility]  Staff   Office tel: [phone]

## 2018-01-12 NOTE — PROGRESS NOTES
" 01/12/18 1100   Quick Adds   Type of Visit Initial Occupational Therapy Evaluation   Living Environment   Lives With alone   Living Arrangements apartment;independent living facility   Home Accessibility tub/shower is not walk in   Number of Stairs to Enter Home 0   Number of Stairs Within Home 0   Stair Railings at Home none   Transportation Available car;family or friend will provide   Living Environment Comment Patient reports living alone in an apartment with near daily assist from son in the morning.  Patient has assist with bathing, med management, housekeeping and meal prep at baseline    Self-Care   Dominant Hand right   Usual Activity Tolerance fair   Current Activity Tolerance fair   Regular Exercise yes   Activity/Exercise Type walking   Exercise Amount/Frequency daily   Equipment Currently Used at Home walker, rolling;shower chair;grab bar  (in both shower and surrounding toilet )   Functional Level Prior   Ambulation 3-->assistive equipment and person   Transferring 3-->assistive equipment and person   Toileting 3-->assistive equipment and person   Bathing 3-->assistive equipment and person   Dressing 2-->assistive person   Eating 2-->assistive person   Communication 2-->difficulty understanding (not related to language barrier)   Swallowing 2-->difficulty swallowing liquids   Cognition 0 - no cognition issues reported   Fall history within last six months yes   Number of times patient has fallen within last six months 2   Which of the above functional risks had a recent onset or change? transferring;ambulation;toileting;fall history;bathing   General Information   Onset of Illness/Injury or Date of Surgery - Date 01/08/17   Referring Physician Dr. De La Vega    Patient/Family Goals Statement [facility] and [facility] when able    Additional Occupational Profile Info/Pertinent History of Current Problem Per H&P: \"Shaun Gotti is a 89 year old male who with a history of Type II " "Diabetes, Coronary Atherosclerosis, Hypertension, Hyperlipidemia, Thrombocytopenia, Type II Diabetes, Chronic Kidney Disease Stage III, and Weakness, who presents to the ED via EMS complaining of weakness. Patient is a resident at a senior facility and is cared for by a RN and other facility staff. Today, patient appeared increasingly weak and short of breath. The facility staff sent the patient to the ED because of his symptoms. He states that he has been experiencing weakness intermittently for \"a couple years\". He also complains of intermittent dizziness, frequent shortness of breath, a slight cough, and occasional chest pain. He notes a \"small amount of vomiting the other day\" and a bout of diarrhea that lasted about 1 week. Patient complains that he \"feels dehydrated\". He states that he is concerned that he had a stroke a couple weeks ago as well, because his son has been experiencing difficulty understanding him. Patient says that his chronic back pain has continued to be bothersome. Patient denies any fever, nausea, urinary issues, headache, or other associated symptoms. SHx of Pacemaker Placement, Cholecystectomy, and Umbilical Hernia Repair. Patient is currently on Plavix\"   Precautions/Limitations fall precautions   Weight-Bearing Status - LUE full weight-bearing   Weight-Bearing Status - RUE full weight-bearing   Weight-Bearing Status - LLE full weight-bearing   Weight-Bearing Status - RLE full weight-bearing   Cognitive Status Examination   Orientation person;place   Level of Consciousness alert   Able to Follow Commands moderate impairment;success, 2-step commands   Personal Safety (Cognitive) decreased awareness, need for assist;decreased awareness, need for safety;moderate impairment   Memory impaired   Attention Quiet environment required;Sustained attention impaired;Divided attention impaired, difficulty with simultaneous tasks  (may be in part to hearing loss at baseline )   Visual Perception "   Visual Perception Wears glasses   Visual Perception Comments Legally blind    Sensory Examination   Sensory Quick Adds No deficits were identified   Pain Assessment   Patient Currently in Pain No   Range of Motion (ROM)   ROM Quick Adds No deficits were identified   ROM Comment UE  ROM WFL    Strength   Strength Comments Generalized weakness    Coordination   Upper Extremity Coordination Left UE impaired;Right UE impaired   Transfer Skill: Bed to Chair/Chair to Bed   Level of [place]: Bed to Chair contact guard   Physical Assist/Nonphysical Assist: Bed to Chair 1 person assist;verbal cues;supervision;set-up required   Weight-Bearing Restrictions weight-bearing as tolerated   Assistive Device - Transfer Skill Bed to Chair Chair to Bed Rehab Eval rolling walker   Transfer Skill: Sit to Stand   Level of [place]: Sit/Stand contact guard   Physical Assist/Nonphysical Assist: Sit/Stand verbal cues;set-up required;supervision;1 person assist   Transfer Skill: Sit to Stand weight-bearing as tolerated   Assistive Device for Transfer: Sit/Stand rolling walker   Transfer Skill: Toilet Transfer   Level of [place]: Toilet minimum assist (75% patients effort)   Physical Assist/Nonphysical Assist: Toilet 1 person assist;verbal cues;supervision;set-up required   Weight-Bearing Restrictions: Toilet weight-bearing as tolerated   Assistive Device rolling walker;seat riser;grab bars   Upper Body Dressing   Level of [place]: Dress Upper Body minimum assist (75% patients effort)   Physical Assist/Nonphysical Assist: Dress Upper Body verbal cues;supervision;set-up required;1 person assist   Lower Body Dressing   Level of [place]: Dress Lower Body moderate assist (50% patients effort)   Physical Assist/Nonphysical Assist: Dress Lower Body 1 person assist;verbal cues;supervision;set-up required   Toileting   Level of [place]: Toilet minimum assist (75% patients effort)   Physical Assist/Nonphysical Assist:  "Toilet verbal cues;supervision;1 person assist   Grooming   Level of [place]: Grooming contact guard   Physical Assist/Nonphysical Assist: Grooming set-up required;supervision   Instrumental Activities of Daily Living (IADL)   Previous Responsibilities (gets assistance from son with IADL's at baseline )   Activities of Daily Living Analysis   Impairments Contributing to Impaired Activities of Daily Living cognition impaired;strength decreased;coordination impaired   General Therapy Interventions   Planned Therapy Interventions ADL retraining;progressive activity/exercise;transfer training   Clinical Impression   Criteria for Skilled Therapeutic Interventions Met yes, treatment indicated   OT Diagnosis Decreased independence in ADL's    Influenced by the following impairments strength decreased, hearing impairment, cognition, coordination impairment, balance impairment    Assessment of Occupational Performance 1-3 Performance Deficits   Identified Performance Deficits dressing, bathing, grooming    Clinical Decision Making (Complexity) Low complexity   Therapy Frequency daily   Predicted Duration of Therapy Intervention (days/wks) 1 session only    Anticipated Equipment Needs at Discharge (Defer to TCU )   Anticipated Discharge Disposition Transitional Care Facility   Risks and Benefits of Treatment have been explained. Yes   Patient, Family & other staff in agreement with plan of care Yes   [facility] [employer] [employer] \"6 Clicks\"   2016, Trustees of [facility], under license to [employer].  All rights reserved.   6 Clicks Short Forms Daily Activity Inpatient Short Form   [facility]  \"6 Clicks\" Daily Activity Inpatient Short Form   1. Putting on and taking off regular lower body clothing? 3 - A Little   2. Bathing (including washing, rinsing, drying)? 2 - A Lot   3. Toileting, which includes using toilet, bedpan or urinal? 3 - A Little   4. Putting on and taking off regular upper body " clothing? 3 - A Little   5. Taking care of personal grooming such as brushing teeth? 3 - A Little   6. Eating meals? 3 - A Little   Daily Activity Raw Score (Score out of 24.Lower scores equate to lower levels of function) 17   Total Evaluation Time   Total Evaluation Time (Minutes) 10        Thank you for the referral,     Rosalia MOCK

## 2018-01-12 NOTE — PROGRESS NOTES
Shaun Gotti  Gender: male  : 1925  [address]   [facility] [address] [phone] (home) NONE (work)    Medical Record: [id number]  Pharmacy:    [id number] - [place], [place] - [address] [facility] - [place] - [address]  [facility]  Primary Care Provider: Sony Berkowitz    Parent's names are: Data Unavailable (mother) and Data Unavailable (father).      [facility]  2018     Discharge Phone Call:  Key Words/Key Times    [facility] [place]  Vineet Sanford RN

## 2018-01-12 NOTE — PROGRESS NOTES
Name: Shaun Gotti    MRN#: [id number]    Reason for Hospitalization: Confusion [R41.0]  Weakness [R53.1]  Elevated brain natriuretic peptide (BNP) level [R79.89]    Discharge Date: 1/12/2018    Patient / Family response to discharge plan: Patient and family in agreement with d/c to short term rehab at the [facility] for today.  Family transport. Portable oxygen tank rented by family from [facility].  Writer explained upfront cost for this.      Follow-Up Appt: No future appointments.    Other Providers (Care Coordinator, County Services, PCA services etc): No    Discharge Disposition: [facility] short term rehab placement.    TRISTAN BRIGHT

## 2018-01-15 NOTE — PROGRESS NOTES
Clinic Care Coordination Contact  Care Coordination Transition Communication    Referral Source: CTS    Clinical Data: Patient was hospitalized at [facility]    Transition to Facility:              Facility Name: [facility]              Contact name and phone number/fax: [phone]    Plan: RN/SW Care Coordinator will await notification from facility staff informing RN/SW Care Coordinator of patient's discharge plans/needs. RN/SW Care Coordinator will review chart and outreach to facility staff every 4 weeks and as needed.     Belen MOONEY, RN, PHN  Care Coordination    [address] [place] [address]  Office: [phone]  Fax [phone]   [facility]  [address] [place] [address]  Office: [phone] Fax [phone]  [email or web address]   [email or web address]   Connect with [facility] on social media.

## 2018-02-22 PROBLEM — D64.9 ANEMIA: Status: ACTIVE | Noted: 2018-01-01

## 2018-02-22 PROBLEM — N17.9 ACUTE KIDNEY INJURY (H): Status: ACTIVE | Noted: 2017-01-01

## 2018-02-22 NOTE — ED NOTES
Weakness over the past 2-3 weeks. Near syncopal episode today while transferring from sitting to standing.

## 2018-02-22 NOTE — IP AVS SNAPSHOT
[address] [facility]    [address] [place] [address]    Phone:  [phone]                                       After Visit Summary   2/22/2018    Shaun Gotti    MRN: [id number]           After Visit Summary Signature Page     I have received my discharge instructions, and my questions have been answered. I have discussed any challenges I see with this plan with the nurse or doctor.    ..........................................................................................................................................  Patient/Patient Representative Signature      ..........................................................................................................................................  Patient Representative Print Name and Relationship to Patient    ..................................................               ................................................  Date                                            Time    ..........................................................................................................................................  Reviewed by Signature/Title    ...................................................              ..............................................  Date                                                            Time

## 2018-02-22 NOTE — IP AVS SNAPSHOT
MRN:[id number]                      After Visit Summary   2/22/2018    Shaun Gotti    MRN: [id number]           Thank you!     Thank you for choosing [facility] for your care. Our goal is always to provide you with excellent care. Hearing back from our patients is one way we can continue to improve our services. Please take a few minutes to complete the written survey that you may receive in the mail after you visit with us. Thank you!        Patient Information     Date Of Birth          5/23/1925        Designated Caregiver       Most Recent Value    Caregiver    Will someone help with your care after discharge? yes    Name of designated caregiver Panchito Gotti    Phone number of caregiver [phone]    Caregiver address [place]      About your hospital stay     You were admitted on:  February 22, 2018 You last received care in the:  [address]    You were discharged on:  February 26, 2018        Reason for your hospital stay       Hospitalized for blood clots in lungs (pulmonary emboli) and improved. Advised to continue warfarin blood thinner for at least 3 months.                  Who to Call     For medical emergencies, please call 911.  For non-urgent questions about your medical care, please call your primary care provider or clinic, [phone]          Attending Provider     Provider Specialty    Mario Whiting MD Emergency Medicine    Kurt Carrillo MD Internal Medicine       Primary Care Provider Office Phone # Fax #    Sony Berkowitz [phone]      After Care Instructions     Activity       Your activity upon discharge: activity as tolerated            Diet       Follow this diet upon discharge: Orders Placed This Encounter      Moderate Consistent CHO Diet            Monitor and record       blood glucose according to your usual home routine                  Follow-up Appointments     Follow-up and recommended labs and tests         Follow up with primary care provider, Sony Berkowitz, within 3 days for hospital follow- up.  The following labs/tests are recommended: INR on 2/28/18.                  Your next 10 appointments already scheduled     Feb 28, 2018  2:15 PM CST   Office Visit with Sony Berkowitz MD   [facility] ([facility])    [address] [place] [id number] [phone]           Bring a current list of meds and any records pertaining to this visit. For Physicals, please bring immunization records and any forms needing to be filled out. Please arrive 10 minutes early to complete paperwork.              Additional Services     HOME CARE NURSING REFERRAL       **Order classes of: [facility], [facility] and [facility] will route to the Home Care and Hospice Referral Pool.  Home Care or Hospice will then contact the patient to schedule their appointment.**    If you do not hear from Home Care and Hospice, or you would like to call to schedule, please call the referring place of service that your provider has listed below.  ______________________________________________________________________    Your provider has referred you to: [facility]    Extended Emergency Contact Information  Primary Emergency Contact: GottiPanchito rucker   [facility]  Home Phone: [phone]  Mobile Phone: [phone]  Relation: Son  Secondary Emergency Contact: ShenQian   [place]  Mobile Phone: [phone]  Relation: Daughter    Patient Anticipated Discharge Date: 2/26/2018     RN, PT, HHA to begin 24 - 48 hours after discharge.  PLEASE EVALUATE AND TREAT (Evaluation timeline is 24 - 48 hrs. Please call if there is need for a variance to this timeline).    REASON FOR REFERRAL: Assessment & Treatment: PT and RN and Teaching and Training: New Medication (please describe): warfarin and New or Exacerbated Diagnosis (please describe): pulmonary embolism    ADDITIONAL SERVICES NEEDED: HHA    OTHER  PERTINENT INFORMATION: Patient was last seen by provider on 2/26/2018 for hospital discharge.    No current outpatient prescriptions on file.    Patient Active Problem List:     Diabetes mellitus, type 2 (H)     Essential hypertension, benign     Coronary atherosclerosis     CARDIOVASCULAR SCREENING; LDL GOAL LESS THAN 100     Advanced directives, counseling/discussion     CKD (chronic kidney disease) stage 4, 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)     Thrombocytopenia (H)     Diabetes type 2, uncontrolled (H)     Cardiac pacemaker in situ     Weakness     Elevated lactic acid level     Fall     Acute kidney injury (H)     Coronary artery disease involving native coronary artery - angio in 2006 with multivessel disease managed medically     SOB (shortness of breath)     Hypoxia     Elevated brain natriuretic peptide (BNP) level     Pulmonary hypertension     Dysphagia     Dysarthria     Anemia in chronic kidney disease     Acute pulmonary embolism (H)     Syncope     Acute encephalopathy - possible     MRSA (methicillin resistant staph aureus) culture positive      Documentation of Face to Face and Certification for Home Health Services    I certify that patient, Shaun Gotti is under my care and that I, or a Nurse Practitioner or Physician's Assistant working with me, had a face-to-face encounter that meets the physician face-to-face encounter requirements with this patient on: 2/26/2018.    This encounter with the patient was in whole, or in part, for the following medical condition, which is the primary reason for Home Health Care: acute pulmonary embolism.    I certify that, based on my findings, the following services are medically necessary Home Health Services: Nursing and Physical Therapy    My clinical findings support the need for the above services because: Nurse is needed: To assess vital signs, oxygenation and anticoagulation (INR testing) after changes in medications or other medical regimen.. and Physical Therapy  Services are needed to assess and treat the following functional impairments: abnormal gait and weakness with underlying visual loss and hearing loss.    Further, I certify that my clinical findings support that this patient is homebound (i.e. absences from home require considerable and taxing effort and are for medical reasons or [employer] services or infrequently or of short duration when for other reasons) because: Requires assistance of another person or specialized equipment to access medical services because patient: Is unable to exit home safely on own due to: visual loss. and Requires supervision of another for safe transfer..    Based on the above findings, I certify that this patient is confined to the home and needs intermittent skilled nursing care, physical therapy and/or speech therapy.  The patient is under my care, and I have initiated the establishment of the plan of care.  This patient will be followed by a physician who will periodically review the plan of care.    Physician/Provider to provide follow up care: Sony Berokwitz certified Physician at time of discharge: Kurt Carrillo MD    Please be aware that coverage of these services is subject to the terms and limitations of your health insurance plan.  Call member services at your health plan with any benefit or coverage questions.            INR Clinic Referral                 Future tests that were ordered for you     MD CERTIFICATION HHA PATIENT                 Warfarin Instruction     You have started taking a medicine called warfarin. This is a blood-thinning medicine (anticoagulant). It helps prevent and treat blood clots.      Before leaving the hospital, make sure you know how much to take and how long to take it.      You will need regular blood tests to make sure your blood is clotting safely. It is very important to see your doctor for regular blood tests.    Talk to your doctor before taking any new medicine  "(this includes over-the-counter drugs and herbal products). Many medicines can interact with warfarin. This may cause more bleeding or too much clotting.     Eating a lot of vitamin K--found in green, leafy vegetables--can change the way warfarin works in your body. Do NOT avoid these foods. Instead, try to eat the same amount each day.     Bleeding is the most common side-effect of warfarin. You may notice bleeding gums, a bloody nose, bruises and bleeding longer when you cut yourself. See a doctor at once if:   o You cough up blood  o You find blood in your stool (poop)  o You have a deep cut, or a cut that bleeds longer than 10 minutes   o You have a bad cut, hard fall, accident or hit your head (go to urgent care or the emergency room).    For women who can get pregnant: This medicine can harm an unborn baby. Be very careful not to get pregnant while taking this medicine. If you think you might be pregnant, call your doctor right away.    For more information, read \"Guide to Warfarin Therapy,  the booklet you received in the hospital.        Pending Results     Date and Time Order Name Status Description    2/22/2018 1624 Blood culture Preliminary     2/22/2018 1624 Blood culture Preliminary             Statement of Approval     Ordered          02/26/18 0855  I have reviewed and agree with all the recommendations and orders detailed in this document.  EFFECTIVE NOW     Approved and electronically signed by:  Kurt Carrillo MD             Admission Information     Date & Time Provider Department Dept. Phone    2/22/2018 Kurt Carrillo MD [address] Surgical [phone]      Your Vitals Were     Blood Pressure Pulse Temperature Respirations Weight Pulse Oximetry    139/65 77 98  F (36.7  C) (Axillary) 18 70 kg (154 lb 5.2 oz) 94%    BMI (Body Mass Index)                   22.14 kg/m2           Care EveryWhere ID     This is your Care EveryWhere ID. This could be used by other organizations " to access your [facility] medical records  [id number]        Equal Access to Services     JOHN RAGSDALE : Hadii celine Keen, randee cervantes, toyaseb guardadomacallie tracydangeolmila cerda. So [facility] [phone].    ATENCIÓN: Si habla español, tiene a castano disposición servicios gratuitos de asistencia lingüística. Llame al [phone].    We comply with applicable federal civil rights laws and [place] laws. We do not discriminate on the basis of race, color, national origin, age, disability, sex, sexual orientation, or gender identity.               Review of your medicines      CONTINUE these medicines which may have CHANGED, or have new prescriptions. If we are uncertain of the size of tablets/capsules you have at home, strength may be listed as something that might have changed.        Dose / Directions    hypromellose 0.3 % Soln ophthalmic solution   Commonly known as:  GENTEAL   This may have changed:  how to take this        Dose:  1 drop   Place 1 drop into both eyes 4 times daily   Refills:  0       PRESERVISION AREDS 2 Caps   This may have changed:    - how much to take  - when to take this  - additional instructions        Dose:  1 capsule   Take 1 capsule by mouth 2 times daily   Refills:  0         CONTINUE these medicines which have NOT CHANGED        Dose / Directions    acetaminophen 325 MG tablet   Commonly known as:  TYLENOL   Used for:  Pain        Dose:  650 mg   Take 2 tablets (650 mg) by mouth every 4 hours as needed for mild pain   Quantity:  100 tablet   Refills:  0       amLODIPine 10 MG tablet   Commonly known as:  NORVASC   Used for:  Essential hypertension, benign        Dose:  10 mg   Take 1 tablet (10 mg) by mouth daily   Quantity:  30 tablet   Refills:  0       atorvastatin 20 MG tablet   Commonly known as:  LIPITOR   Used for:  Atherosclerosis of coronary artery of native heart, angina presence unspecified, unspecified vessel or lesion type        Dose:   20 mg   Take 1 tablet (20 mg) by mouth daily   Quantity:  30 tablet   Refills:  0       clopidogrel 75 MG tablet   Commonly known as:  PLAVIX   Used for:  Atherosclerosis of coronary artery of native heart, angina presence unspecified, unspecified vessel or lesion type        1 TABLET DAILY   Quantity:  30 tablet   Refills:  0       finasteride 5 MG tablet   Commonly known as:  PROSCAR   Used for:  Benign prostatic hyperplasia, unspecified whether lower urinary tract symptoms present        Dose:  1 tablet   Take 1 tablet (5 mg) by mouth daily   Quantity:  30 tablet   Refills:  0       insulin glargine 100 UNIT/ML injection   Commonly known as:  LANTUS   Used for:  Type 2 diabetes mellitus with stage 4 chronic kidney disease, with long-term current use of insulin (H)        Dose:  7 Units   Inject 7 Units Subcutaneous every morning   Refills:  0       metoprolol succinate 50 MG 24 hr tablet   Commonly known as:  TOPROL-XL   Used for:  Atherosclerosis of coronary artery of native heart, angina presence unspecified, unspecified vessel or lesion type        Dose:  50 mg   Take 1 tablet (50 mg) by mouth daily   Quantity:  30 tablet   Refills:  0       nitroGLYcerin 0.4 MG sublingual tablet   Commonly known as:  NITROSTAT   Used for:  Atherosclerosis of coronary artery of native heart, angina presence unspecified, unspecified vessel or lesion type        1 TAB EVERY 5 MIN AS NEEDED FOR CHEST PAIN, UP TO 3 PER EPISODE   Quantity:  25 tablet   Refills:  0       omeprazole 20 MG CR capsule   Commonly known as:  priLOSEC   Used for:  Gastroesophageal reflux disease without esophagitis        1 CAPSULE DAILY   Quantity:  30 capsule   Refills:  0       * order for DME   Used for:  Low back pain, Fall, initial encounter        Equipment being ordered: 4 wheel walker with brakes and seat   Quantity:  1 Device   Refills:  0       * order for DME   Used for:  Sepsis(995.91), Falls frequently        Equipment being ordered: walker    Quantity:  1 Device   Refills:  0       * order for DME   Used for:  Sepsis(995.91), Falls frequently        Equipment being ordered: transfer bridge   Quantity:  1 Device   Refills:  0       order for DME   Used for:  Hypoxia, Pulmonary hypertension        Equipment being ordered: Oxygen - patient needs 1L [place] oxygen continuously during transportation to rehab facility.   Quantity:  1 Device   Refills:  0       sertraline 25 MG tablet   Commonly known as:  ZOLOFT   Used for:  Major depression in complete remission (H)        Dose:  25 mg   Take 1 tablet (25 mg) by mouth daily   Quantity:  30 tablet   Refills:  0       tamsulosin 0.4 MG capsule   Commonly known as:  FLOMAX   Used for:  Benign prostatic hyperplasia, unspecified whether lower urinary tract symptoms present        Dose:  0.4 mg   Take 1 capsule (0.4 mg) by mouth At Bedtime   Quantity:  60 capsule   Refills:  0       warfarin 2.5 MG tablet   Commonly known as:  COUMADIN        Do not take warfarin on 2/26 or 2/27, recheck INR on 2/28 and then take warfarin at dose advised by INR clinic   Quantity:  30 tablet   Refills:  0       * Notice:  This list has 3 medication(s) that are the same as other medications prescribed for you. Read the directions carefully, and ask your doctor or other care provider to review them with you.      STOP taking     aspirin 81 MG tablet                    Protect others around you: Learn how to safely use, store and throw away your medicines at www.disposemymeds.org.             Medication List: This is a list of all your medications and when to take them. Check marks below indicate your daily home schedule. Keep this list as a reference.      Medications           Morning Afternoon Evening Bedtime As Needed    acetaminophen 325 MG tablet   Commonly known as:  TYLENOL   Take 2 tablets (650 mg) by mouth every 4 hours as needed for mild pain   Next Dose Due:  Use at home as needed                                   amLODIPine  10 MG tablet   Commonly known as:  NORVASC   Take 1 tablet (10 mg) by mouth daily   Next Dose Due:  Per home routine                                   atorvastatin 20 MG tablet   Commonly known as:  LIPITOR   Take 1 tablet (20 mg) by mouth daily   Next Dose Due:  Per home routine                                   clopidogrel 75 MG tablet   Commonly known as:  PLAVIX   1 TABLET DAILY   Last time this was given:  75 mg on 2/26/2018  9:11 AM   Next Dose Due:  Tuesday 2/27/18                                   finasteride 5 MG tablet   Commonly known as:  PROSCAR   Take 1 tablet (5 mg) by mouth daily   Last time this was given:  5 mg on 2/26/2018  9:11 AM   Next Dose Due:  Tuesday 2/27/18                                   hypromellose 0.3 % Soln ophthalmic solution   Commonly known as:  GENTEAL   Place 1 drop into both eyes 4 times daily   Last time this was given:  9:00 2/26/18                                            insulin glargine 100 UNIT/ML injection   Commonly known as:  LANTUS   Inject 7 Units Subcutaneous every morning   Last time this was given:  0900 2/26/18                                   metoprolol succinate 50 MG 24 hr tablet   Commonly known as:  TOPROL-XL   Take 1 tablet (50 mg) by mouth daily   Last time this was given:  50 mg on 2/26/2018  9:11 AM   Next Dose Due:  Tuesday 2/27/18                                   nitroGLYcerin 0.4 MG sublingual tablet   Commonly known as:  NITROSTAT   1 TAB EVERY 5 MIN AS NEEDED FOR CHEST PAIN, UP TO 3 PER EPISODE                                   omeprazole 20 MG CR capsule   Commonly known as:  priLOSEC   1 CAPSULE DAILY   Last time this was given:  20 mg on 2/26/2018  6:32 AM   Next Dose Due:  Tuesday 2/27/18                                   * order for DME   Equipment being ordered: 4 wheel walker with brakes and seat                                * order for DME   Equipment being ordered: walker                                * order for DME   Equipment  being ordered: transfer bridge                                order for DME   Equipment being ordered: Oxygen - patient needs 1L [place] oxygen continuously during transportation to rehab facility.                                PRESERVISION AREDS 2 Caps   Take 1 capsule by mouth 2 times daily   Next Dose Due:  Take per home routine                                      sertraline 25 MG tablet   Commonly known as:  ZOLOFT   Take 1 tablet (25 mg) by mouth daily   Next Dose Due:  Take per home routine                                   tamsulosin 0.4 MG capsule   Commonly known as:  FLOMAX   Take 1 capsule (0.4 mg) by mouth At Bedtime   Last time this was given:  0.4 mg on 2/25/2018  9:23 PM   Next Dose Due:  This evening 2/26/18                                   warfarin 2.5 MG tablet   Commonly known as:  COUMADIN   Do not take warfarin on 2/26 or 2/27, recheck INR on 2/28 and then take warfarin at dose advised by INR clinic   Last time this was given:  2.5 mg on 2/25/2018  5:01 PM   Next Dose Due:  Resume when Coumadin Clinic advises you to.                                   * Notice:  This list has 3 medication(s) that are the same as other medications prescribed for you. Read the directions carefully, and ask your doctor or other care provider to review them with you.

## 2018-02-22 NOTE — ED PROVIDER NOTES
History     Chief Complaint   Patient presents with     Generalized Weakness     The history is provided by the patient and a caregiver.     Shaun Gotti is a 89 year old male with a history of  Type II Diabetes, Coronary Atherosclerosis, Hypertension, Hyperlipidemia, Thrombocytopenia, Chronic Kidney Disease Stage III, and Weakness, who presents to the ED via EMS complaining of generalized weakness. The patient was seen int he ED on 1/10/2018 due to weakness and shortness of breath. He was hospitalized for 2 days before being discharged to the [facility] rehab on 1/12/2018. Patient has been in rehab up until 2/19 at which time he was discharged back to his [facility]. Today, his caretaker went in to his room and noticed that it appeared like the patient had tried to get out of bed on his own. She took him to the bathroom in the . Once in the bathroom, he is usually able to go by himself and call his caretaker when he is done. Today, he attempted to stand to move the toilet but fell back in to the wheelchair and slumped over. She had to yell his name several times to get him to respond. She says that he has been more somnolent and weak since his discharge from the hospital. He is also pale. The caretaker has noticed pale, white stools the last few days. Patient has been coughing for 2 weeks as well. Patient denies any fever, headache, abdominal pain, urinary issues, headache, or other associated symptoms. SHx of Pacemaker Placement, Cholecystectomy, and Umbilical Hernia Repair. Patient is currently on Plavix. He does not use tobacco. Patient had an Influenza vaccination this year.    Problem List:    Patient Active Problem List    Diagnosis Date Noted     Sepsis (H) 03/27/2013     Priority: High     Problem list name updated by automated process. Provider to review       Acute viral bronchitis 03/25/2013     Priority: High     Pneumonia 03/25/2013     Priority: High     Cough 03/25/2013      Priority: High     Acute respiratory failure with hypoxia (H) 03/25/2013     Priority: High     Decreased oral intake 01/11/2018     Priority: Medium     Dysphagia 01/11/2018     Priority: Medium     Dysarthria 01/11/2018     Priority: Medium     Diarrhea 01/10/2018     Priority: Medium     SOB (shortness of breath) 01/10/2018     Priority: Medium     Hypoxia 01/10/2018     Priority: Medium     Elevated brain natriuretic peptide (BNP) level 01/10/2018     Priority: Medium     Pulmonary hypertension 01/10/2018     Priority: Medium     Elevated troponin 01/10/2018     Priority: Medium     Hypovolemia 09/22/2017     Priority: Medium     Fall 09/21/2017     Priority: Medium     Acute kidney injury (H) 09/21/2017     Priority: Medium     Hypernatremia 09/21/2017     Priority: Medium     Coronary artery disease involving native coronary artery - angio in 2006 with multivessel disease managed medically 09/21/2017     Priority: Medium     Weakness 08/17/2016     Priority: Medium     Elevated lactic acid level 08/17/2016     Priority: Medium     CKD (chronic kidney disease) stage 3, GFR 30-59 ml/min 08/17/2016     Priority: Medium     Cardiac pacemaker in situ 08/19/2015     Priority: Medium     CKD (chronic kidney disease) stage 4, 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) 03/25/2013     Priority: Medium     Bronchitis 03/25/2013     Priority: Medium     Diabetes type 2, uncontrolled (H) 03/25/2013     Priority: Medium     Problem list name updated by automated process. Provider to review       CARDIOVASCULAR SCREENING; LDL GOAL LESS THAN 100 10/31/2010     Priority: Medium     Diabetes mellitus, type 2 (H) 04/28/2003     Priority: Medium     Problem list name updated by automated process. Provider to review       Thrombocytopenia (H) 03/25/2013     Priority: Low     Advanced directives, counseling/discussion 02/02/2012     Priority: Low     Advance Directive Problem List Overview:   Name Relationship Phone    Primary Health Care Agent Marilou  Gotti  Spouse  [phone]         Alternative Health Care Agent 2. Vika Shen    3. Corey Shen    4. Zhanna Blanco  Daughter    Son    Daughter [phone]       Patient states has Advance Directive and will bring in a copy to clinic. Pt wife brought copy into the clinic.Cuca Nunez CMA  2/2/2012 2/7/12  Received outside Health Care Directive. Previously signed by patient and notary on 12/27/2002.  scanned and placed behind media tab on 2/6/12.  Please see Health Care Directive for specifics...Keshia Ortiz RN         Essential hypertension, benign 07/07/2006     Priority: Low     Coronary atherosclerosis 07/07/2006     Priority: Low     Problem list name updated by automated process. Provider to review          Past Medical History:    Past Medical History:   Diagnosis Date     Bronchitis, not specified as acute or chronic 09/26/10     Chronic kidney disease (CKD)      Coronary atherosclerosis of native coronary artery 1/22/2010     Coronary atherosclerosis of unspecified type of vessel, native or graft      Hernia of unspecified site of abdominal cavity without mention of obstruction or gangrene      Other and unspecified hyperlipidemia      Prostatitis, unspecified      Type II or unspecified type diabetes mellitus without mention of complication, not stated as uncontrolled      Unspecified essential hypertension        Past Surgical History:    Past Surgical History:   Procedure Laterality Date     CARDIAC CATHERIZATION  10/23/2006    [facility]     HC REMOVAL GALLBLADDER      Cholecystectomy     HC S&I FLUORO PACEMAKER       HERNIA REPAIR         Family History:    Family History   Problem Relation Age of Onset     CANCER Father      prostate cancer       Social History:  Marital Status:   [2]  Social History   Substance Use Topics     Smoking status: Never Smoker     Smokeless tobacco: Never Used     Alcohol use No        Medications:       acetaminophen (TYLENOL) 325 MG tablet   sertraline (ZOLOFT) 25 MG tablet   nitroGLYcerin (NITROSTAT) 0.4 MG sublingual tablet   aspirin 81 MG tablet   insulin glargine (LANTUS) 100 UNIT/ML injection   atorvastatin (LIPITOR) 20 MG tablet   metoprolol succinate (TOPROL-XL) 50 MG 24 hr tablet   amLODIPine (NORVASC) 10 MG tablet   clopidogrel (PLAVIX) 75 MG tablet   omeprazole (PRILOSEC) 20 MG CR capsule   finasteride (PROSCAR) 5 MG tablet   tamsulosin (FLOMAX) 0.4 MG capsule   order for DME   Multiple Vitamins-Minerals (PRESERVISION AREDS 2 PO)   B Complex-C-Folic Acid (RENAL MULTIVITAMIN FORMULA PO)   hypromellose (GENTEAL) 0.3 % SOLN   ORDER FOR DME   ORDER FOR DME   ORDER FOR DME         Review of Systems  All other ROS reviewed and are negative or non-contributory except as stated in HPI.    Physical Exam   BP: 105/65  Pulse: 86  Heart Rate: 86  Temp: 96.9  F (36.1  C)  Resp: 20  Weight: 65.8 kg (145 lb)  SpO2: (!) 86 %      Physical Exam general pale and cachectic 89-year-old.  Hard of hearing even with hearing aids present.  With loud voice he will answer yes, no or 1-2 word answers.  No facial asymmetry or droop.  Extraocular motions are intact.  Lips and oral mucosa are dry.  Neck reveals no meningismus.  Lungs reveal basilar crackles with no wheezes.  Cardiac regular rate borderline tachycardic.  Abdomen reveals mild epigastric tenderness without guarding or rebound.  There is no organomegaly or masses.  Extremities reveal trace pitting edema in his ankles. Negative Homans.  Neurologically generally weak but no focal weakness or sensory changes were noted.  Patient is unable ambulate due to his generalized weakness.    ED Course     ED Course     Procedures               EKG Interpretation:      Interpreted by Mario Whiting  Time reviewed: 17:33  Symptoms at time of EKG: Generalized weakness  Rhythm: normal sinus   Rate: Tachycardia  Axis: Normal  Ectopy: none  Conduction: nonspecific interventricular  conduction block  ST Segments/ T Waves: No acute ischemic changes  Q Waves: none  Comparison to prior: Unchanged from 1/10/18    Clinical Impression: Normal sinus EKG with nonspecific interventricular conduction block    Results for orders placed or performed during the hospital encounter of 02/22/18   XR Chest 2 Views    Narrative    XR CHEST 2 VW 2/22/2018 5:29 PM    HISTORY: Cough.    COMPARISON: January 10, 2018.      Impression    IMPRESSION: Left-sided cardiac device in place with leads unchanged in  position. Mild venous congestion, similar to prior exam. Otherwise, no  new focal pulmonary opacities. No pleural effusions or pneumothorax.  Stable heart size.    BURT REYNOLDS MD                 Critical Care time:  none     Patient's initial lactic acid was elevated at 4.6.  There is no signs of severe sepsis.  After 500 cc bolus of fluids repeat lactic acid was now normal at 1.7         Results for orders placed or performed during the hospital encounter of 02/22/18 (from the past 24 hour(s))   CBC with platelets differential   Result Value Ref Range    WBC 5.3 4.0 - 11.0 10e9/L    RBC Count 3.17 (L) 4.4 - 5.9 10e12/L    Hemoglobin 9.0 (L) 13.3 - 17.7 g/dL    Hematocrit 30.8 (L) 40.0 - 53.0 %    MCV 97 78 - 100 fl    MCH 28.4 26.5 - 33.0 pg    MCHC 29.2 (L) 31.5 - 36.5 g/dL    RDW 14.3 10.0 - 15.0 %    Platelet Count 198 150 - 450 10e9/L    Diff Method Automated Method     % Neutrophils 71.0 %    % Lymphocytes 15.4 %    % Monocytes 9.6 %    % Eosinophils 3.6 %    % Basophils 0.2 %    % Immature Granulocytes 0.2 %    Absolute Neutrophil 3.8 1.6 - 8.3 10e9/L    Absolute Lymphocytes 0.8 0.8 - 5.3 10e9/L    Absolute Monocytes 0.5 0.0 - 1.3 10e9/L    Absolute Eosinophils 0.2 0.0 - 0.7 10e9/L    Absolute Basophils 0.0 0.0 - 0.2 10e9/L    Abs Immature Granulocytes 0.0 0 - 0.4 10e9/L   Comprehensive metabolic panel   Result Value Ref Range    Sodium 138 133 - 144 mmol/L    Potassium 4.8 3.4 - 5.3 mmol/L    Chloride  104 94 - 109 mmol/L    Carbon Dioxide 18 (L) 20 - 32 mmol/L    Anion Gap 16 (H) 3 - 14 mmol/L    Glucose 221 (H) 70 - 99 mg/dL    Urea Nitrogen 49 (H) 7 - 30 mg/dL    Creatinine 4.22 (H) 0.66 - 1.25 mg/dL    GFR Estimate 13 (L) >60 mL/min/1.7m2    GFR Estimate If Black 16 (L) >60 mL/min/1.7m2    Calcium 8.9 8.5 - 10.1 mg/dL    Bilirubin Total 0.5 0.2 - 1.3 mg/dL    Albumin 2.5 (L) 3.4 - 5.0 g/dL    Protein Total 7.5 6.8 - 8.8 g/dL    Alkaline Phosphatase 154 (H) 40 - 150 U/L    ALT 29 0 - 70 U/L    AST 49 (H) 0 - 45 U/L   Lipase   Result Value Ref Range    Lipase 54 (L) 73 - 393 U/L   Lactic acid whole blood   Result Value Ref Range    Lactic Acid 4.6 (HH) 0.7 - 2.0 mmol/L   TSH with free T4 reflex   Result Value Ref Range    TSH 6.14 (H) 0.40 - 4.00 mU/L   Troponin I   Result Value Ref Range    Troponin I ES <0.015 0.000 - 0.045 ug/L   T4 free   Result Value Ref Range    T4 Free 1.03 0.76 - 1.46 ng/dL   Nt probnp inpatient   Result Value Ref Range    N-Terminal Pro BNP Inpatient 3711 (H) 0 - 1800 pg/mL   XR Chest 2 Views    Narrative    XR CHEST 2 VW 2/22/2018 5:29 PM    HISTORY: Cough.    COMPARISON: January 10, 2018.      Impression    IMPRESSION: Left-sided cardiac device in place with leads unchanged in  position. Mild venous congestion, similar to prior exam. Otherwise, no  new focal pulmonary opacities. No pleural effusions or pneumothorax.  Stable heart size.    BURT REYNOLDS MD   UA reflex to Microscopic   Result Value Ref Range    Color Urine Yellow     Appearance Urine Slightly Cloudy     Glucose Urine 50 (A) NEG^Negative mg/dL    Bilirubin Urine Negative NEG^Negative    Ketones Urine Negative NEG^Negative mg/dL    Specific Gravity Urine 1.014 1.003 - 1.035    Blood Urine Negative NEG^Negative    pH Urine 5.0 5.0 - 7.0 pH    Protein Albumin Urine 100 (A) NEG^Negative mg/dL    Urobilinogen mg/dL 2.0 0.0 - 2.0 mg/dL    Nitrite Urine Negative NEG^Negative    Leukocyte Esterase Urine Negative NEG^Negative     "Source Catheterized Urine     RBC Urine 1 0 - 2 /HPF    WBC Urine 1 0 - 2 /HPF    Bacteria Urine Few (A) NEG^Negative /HPF    Hyaline Casts 1 0 - 2 /LPF    Amorphous Crystals Few (A) NEG^Negative /HPF   Blood gas venous   Result Value Ref Range    Ph Venous 7.35 7.32 - 7.43 pH    PCO2 Venous 42 40 - 50 mm Hg    PO2 Venous 31 25 - 47 mm Hg    Bicarbonate Venous 23 21 - 28 mmol/L    Base Deficit Venous 2.4 mmol/L    FIO2 36    Lactic acid whole blood   Result Value Ref Range    Lactic Acid 1.7 0.7 - 2.0 mmol/L     Medications   0.9% sodium chloride BOLUS (1,000 mLs Intravenous New Bag 2/22/18 8367)     Followed by   sodium chloride 0.9% infusion (not administered)     Peripheral IV established. IV fluids given. Labs collected including CBC, CMP, Lipase, Lactic Acid, TSH, Troponin, and T4 free. Urine collected. Chest X-ray obtained.   After half liter of IV fluids patient seemed more alert.  He was no speaking in complete sentences and asking \"what happened to me\"  We did repeat the lactic acid, added venous blood gas, and proBNP.  He was catheter urinalysis.  Patient's venous blood gas and lactic acid were normal  Assessments & Plan (with Medical Decision Making)     Shaun is a 89 year old male who presents to the ED with increasing weakness, somnolence, and a near syncopal episode this afternoon. He has been experiencing these symptoms since being discharge from the hospital on 1/12.  He had a evaluation no cause for his symptoms were found.  He did have elevated proBNP but echo did not show significant change or significant congestive failure.  He was noted to have pulmonary hypertension.  Patient's SpO2 is low at 86%, which improves with O2 via nasal cannula at 3 LPM. He is otherwise afebrile with normal vitals upon presentation to the ED. On exam patient appeared ill, pale, and cachectic.  He is slow to respond and this may be related to hearing issues.  He had no facial asymmetry.  He is able speak in the yes and " no answers or short sentences.  Lips and oral mucosa are dry.  Neck was supple.  Lungs reveal basilar crackles no wheezes.  Cardiac borderline tachycardic.  Abdomen revealed midepigastric tenderness without guarding or rebound.  Extremities revealed trace pitting edema but no tenderness with palpation of the calf or thigh.  Neurologically he was generally weak but did not have focal motor weakness.  Unable to assess gait due to weakness.  Peripheral IV established. IV fluids given. Labs collected including CBC, CMP, Lipase, Lactic Acid, TSH, Troponin, and T4 free.  He did have mild anemia at 9 and this is decreased from 11 previously.  His initial lactic acid was elevated at 4.6 but after half liter of fluids the value was rechecked and was down to 1.7.  Venous blood gas was also normal.  Urine unremarkable . Chest X-ray obtained and showed no acute infiltrate.  There was noted mild venous congestion similar to previous eval. proBNP was 3711.  Previous admission it was 21,600. Patient be admitted to the hospital for further fluid resuscitation and observation.  Dr. Carrillo from the hospitalist service was apprised and will follow on admission    I have reviewed the nursing notes.    I have reviewed the findings, diagnosis, plan and need for follow up with the patient.       New Prescriptions    No medications on file       Final diagnoses:   Hypoxia   Anemia, unspecified type   Generalized muscle weakness     This document serves as a record of services personally performed by Mario Whiting MD. It was created on their behalf by Kristie Tuttle, a trained medical scribe. The creation of this record is based on the provider's personal observations and the statements of the patient. This document has been checked and approved by the attending provider.    Note: Chart documentation done in part with Dragon Voice Recognition software. Although reviewed after completion, some word and grammatical errors may  remain.    2/22/2018   [facility] EMERGENCY DEPARTMENT     Mario Whiting MD  02/22/18 3200

## 2018-02-22 NOTE — LETTER
Transition Communication Hand-off for Care Transitions to Next Level of Care Provider    Name: Shaun Gotti  MRN #: [id number]  Primary Care Provider: Sony Berkowitz  Primary Care MD Name: Ila Berkowitz  Primary Clinic: [facility] [address] [place] [address]  Primary Care Clinic Name: [facility]  Reason for Hospitalization:  Hypoxia [R09.02]  Generalized muscle weakness [M62.81]  Anemia, unspecified type [D64.9]  Admit Date/Time: 2/22/2018  3:59 PM  Discharge Date: 2/26/18  Payor Source: Payor: BCBS / Plan: BCBS PLATINUM BLUE / Product Type: PPO /     Readmission Assessment Measure (DAPHNE) Risk Score/category: Elevated      Reason for Communication Hand-off Referral: Fragility  New to coumadin  Discharge Plan:  Discharge Plan:       Most Recent Value    Concerns To Be Addressed all concerns addressed in this encounter           Concern for non-adherence with plan of care:   Y/N no  Discharge Needs Assessment:  Needs       Most Recent Value    Anticipated Changes Related to Illness none    Equipment Currently Used at Home walker, rolling, raised toilet, shower chair, grab bar    Transportation Available family or friend will provide    # of Referrals Placed by [facility], Internal Clinic Care Coordination    Home Care -- [[facility]]          Already enrolled in Tele-monitoring program and name of program:  no  Follow-up specialty is recommended: No    Follow-up plan:  Future Appointments  Date Time Provider Department Center   2/26/2018 1:45 PM Dia Leong, PT [facility]   2/28/2018 2:15 PM Sony Berkowitz MD [facility]       Any outstanding tests or procedures:        Referrals     Future Labs/Procedures    HOME CARE NURSING REFERRAL     Comments:    **Order classes of: [facility], [facility] and [facility] will route to the Home Care and Hospice Referral Pool.  Home Care or Hospice will then contact the patient to schedule their appointment.**    If you  do not hear from Home Care and Hospice, or you would like to call to schedule, please call the referring place of service that your provider has listed below.  ______________________________________________________________________    Your provider has referred you to: [facility]    Extended Emergency Contact Information  Primary Emergency Contact: Panchito Gotti   [facility]  Home Phone: [phone]  Mobile Phone: [phone]  Relation: Son  Secondary Emergency Contact: Qian Gotti   [place]  Mobile Phone: [phone]  Relation: Daughter    Patient Anticipated Discharge Date: 2/26/2018     RN, PT, HHA to begin 24 - 48 hours after discharge.  PLEASE EVALUATE AND TREAT (Evaluation timeline is 24 - 48 hrs. Please call if there is need for a variance to this timeline).    REASON FOR REFERRAL: Assessment & Treatment: PT and RN and Teaching and Training: New Medication (please describe): warfarin and New or Exacerbated Diagnosis (please describe): pulmonary embolism    ADDITIONAL SERVICES NEEDED: HHA    OTHER PERTINENT INFORMATION: Patient was last seen by provider on 2/26/2018 for hospital discharge.    No current outpatient prescriptions on file.    Patient Active Problem List:     Diabetes mellitus, type 2 (H)     Essential hypertension, benign     Coronary atherosclerosis     CARDIOVASCULAR SCREENING; LDL GOAL LESS THAN 100     Advanced directives, counseling/discussion     CKD (chronic kidney disease) stage 4, 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)     Thrombocytopenia (H)     Diabetes type 2, uncontrolled (H)     Cardiac pacemaker in situ     Weakness     Elevated lactic acid level     Fall     Acute kidney injury (H)     Coronary artery disease involving native coronary artery - angio in 2006 with multivessel disease managed medically     SOB (shortness of breath)     Hypoxia     Elevated brain natriuretic peptide (BNP) level     Pulmonary hypertension     Dysphagia     Dysarthria     Anemia in chronic kidney  disease     Acute pulmonary embolism (H)     Syncope     Acute encephalopathy - possible     MRSA (methicillin resistant staph aureus) culture positive      Documentation of Face to Face and Certification for Home Health Services    I certify that patient, Shaun Gotti is under my care and that I, or a Nurse Practitioner or Physician's Assistant working with me, had a face-to-face encounter that meets the physician face-to-face encounter requirements with this patient on: 2/26/2018.    This encounter with the patient was in whole, or in part, for the following medical condition, which is the primary reason for Home Health Care: acute pulmonary embolism.    I certify that, based on my findings, the following services are medically necessary Home Health Services: Nursing and Physical Therapy    My clinical findings support the need for the above services because: Nurse is needed: To assess vital signs, oxygenation and anticoagulation (INR testing) after changes in medications or other medical regimen.. and Physical Therapy Services are needed to assess and treat the following functional impairments: abnormal gait and weakness with underlying visual loss and hearing loss.    Further, I certify that my clinical findings support that this patient is homebound (i.e. absences from home require considerable and taxing effort and are for medical reasons or Tao[employer] services or infrequently or of short duration when for other reasons) because: Requires assistance of another person or specialized equipment to access medical services because patient: Is unable to exit home safely on own due to: visual loss. and Requires supervision of another for safe transfer..    Based on the above findings, I certify that this patient is confined to the home and needs intermittent skilled nursing care, physical therapy and/or speech therapy.  The patient is under my care, and I have initiated the establishment of the plan of care.  This  patient will be followed by a physician who will periodically review the plan of care.    Physician/Provider to provide follow up care: Sony Berkowitz    [facility] certified Physician at time of discharge: Kurt Carrillo MD    Please be aware that coverage of these services is subject to the terms and limitations of your health insurance plan.  Call member services at your health plan with any benefit or coverage questions.    INR Clinic Referral             Key Recommendations:  Patient will return home with Son and daughter in law, [facility] RN PT HHA and clinic follow up New to coumadin is being enrolled in coumadin clinic.    Maria Alejandra Leong    AVS/Discharge Summary is the source of truth; this is a helpful guide for improved communication of patient story

## 2018-02-23 PROBLEM — I26.99 ACUTE PULMONARY EMBOLISM (H): Status: ACTIVE | Noted: 2018-01-01

## 2018-02-23 PROBLEM — R55 SYNCOPE: Status: ACTIVE | Noted: 2018-01-01

## 2018-02-23 PROBLEM — G93.40 ACUTE ENCEPHALOPATHY: Status: ACTIVE | Noted: 2018-01-01

## 2018-02-23 PROBLEM — R79.89 ELEVATED TROPONIN: Status: RESOLVED | Noted: 2018-01-01 | Resolved: 2018-01-01

## 2018-02-23 PROBLEM — R19.7 DIARRHEA: Status: RESOLVED | Noted: 2018-01-01 | Resolved: 2018-01-01

## 2018-02-23 PROBLEM — E86.1 HYPOVOLEMIA: Status: RESOLVED | Noted: 2017-01-01 | Resolved: 2018-01-01

## 2018-02-23 PROBLEM — Z22.322 MRSA (METHICILLIN RESISTANT STAPH AUREUS) CULTURE POSITIVE: Status: ACTIVE | Noted: 2018-01-01

## 2018-02-23 PROBLEM — N18.9 ANEMIA IN CHRONIC KIDNEY DISEASE: Status: ACTIVE | Noted: 2018-01-01

## 2018-02-23 PROBLEM — E87.0 HYPERNATREMIA: Status: RESOLVED | Noted: 2017-01-01 | Resolved: 2018-01-01

## 2018-02-23 PROBLEM — D63.1 ANEMIA IN CHRONIC KIDNEY DISEASE: Status: ACTIVE | Noted: 2018-01-01

## 2018-02-23 PROBLEM — I95.9 HYPOTENSION: Status: ACTIVE | Noted: 2018-01-01

## 2018-02-23 PROBLEM — R63.8 DECREASED ORAL INTAKE: Status: RESOLVED | Noted: 2018-01-01 | Resolved: 2018-01-01

## 2018-02-23 NOTE — PROGRESS NOTES
02/23/18 1300   Quick Adds   Type of Visit Initial PT Evaluation   Living Environment   Lives With facility resident   Living Arrangements independent living facility   Home Accessibility tub/shower is not walk in   Number of Stairs to Enter Home 0   Number of Stairs Within Home 0   Transportation Available family or friend will provide   Living Environment Comment [employer] apartment. Son visits daily at baseline but has been staying with him [facility]. He has been home for 10 days. Shaun has been walking around his house with walker until he becameo more weak yesterday and EMS was called   Self-Care   Usual Activity Tolerance fair   Current Activity Tolerance fair   Regular Exercise yes   Activity/Exercise Type walking   Activity/Exercise/Self-Care Comment Walking daily at his baseline, and has been waiting ot start home PT after his [facility]. Uses [employer] and has a WC if needed> Has help with meals, medications, shopping. 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    Functional Level Prior   Ambulation 1-->assistive equipment   Transferring 1-->assistive equipment   Toileting 1-->assistive equipment   Bathing 3-->assistive equipment and person   Dressing 0-->independent   Eating 0-->independent   Communication 0-->understands/communicates without difficulty   Swallowing 0-->swallows foods/liquids without difficulty   Cognition 1 - attention or memory deficits   Fall history within last six months yes   Number of times patient has fallen within last six months 3   Which of the above functional risks had a recent onset or change? ambulation   Prior Functional Level Comment Weak upon admission but was walking the day prior just fine with his walker   General Information   Onset of Illness/Injury or Date of Surgery - Date 02/23/18   Referring Physician Dr Carrillo   Patient/Family Goals Statement Get back home   Pertinent History of Current Problem  "(include personal factors and/or comorbidities that impact the POC) Per MD note: Shaun Gotti is a 89 year old male who presents with symptoms, signs, and test results concerning for acute pulmonary embolism with syncope, moderate to severe hypoxia, acute kidney injury, acute change in mentation consistent with an acute encephalopathy, and severely elevated lactic acid level consistent with tissue hypoperfusion and tissue hypoxia.\" Admitted here in Jan, [facility] and has been home 10 days with his family at his apartment 24/7 to help. He became more weak yesterday and more confused, presented via EMS   Precautions/Limitations fall precautions   Weight-Bearing Status - LUE weight-bearing as tolerated   Weight-Bearing Status - RUE weight-bearing as tolerated   Weight-Bearing Status - LLE weight-bearing as tolerated   Weight-Bearing Status - RLE weight-bearing as tolerated   General Info Comments 3 L O2 95% , NSA vitals taken post tx, stable   Cognitive Status Examination   Orientation person;time   Cognitive Comment Hard of hearing.  Does answer questiosn well, but then reports there is no bathroom in his room, and that he is in [place]. Intermittent confusion noted in our session   Pain Assessment   Patient Currently in Pain No   Integumentary/Edema   Integumentary/Edema no deficits were identifed   Range of Motion (ROM)   ROM Comment B UE and LE WFL for mobility   Strength   Strength Comments Functional strength observed via mobility. AROM > 3/5 in UEs and LEs in bed   Bed Mobility   Bed Mobility Comments SBA    Transfer Skills   Transfer Comments SBA sit to stand with set up completed, toileting with min A, needs mod cues to set up self when going to sit in chair as tended to try and sit prematurely but redirected well. Some confusion but able to do transfers with close CGA to min A and mod cues.    Gait   Gait Comments Gait 2 x 10 feet into bathroom with equipment management and CGA.  NO LOB " "but needs help navigating the cords and cues to set up well, pocket talker used to help him hear commands.    Balance   Balance Comments Sitting fair,standing fair   Sensory Examination   Sensory Perception Comments NT formally   Coordination   Coordination no deficits were identified   Muscle Tone   Muscle Tone no deficits were identified   General Therapy Interventions   Planned Therapy Interventions gait training;home program guidelines;progressive activity/exercise;ROM;strengthening   Intervention Comments Progressive activity, TE, gait/transfers   Clinical Impression   Criteria for Skilled Therapeutic Intervention yes, treatment indicated   PT Diagnosis Reduced activity tolerance, strength with recent PE, also recent rehab stay   Influenced by the following impairments ROM, strength, transfers   Functional limitations due to impairments Gait,transfers, ambulation   Clinical Presentation Evolving/Changing   Clinical Presentation Rationale Advanced age, recent rehab stay, recent level of function vs current   Clinical Decision Making (Complexity) Low complexity   Therapy Frequency` daily   Predicted Duration of Therapy Intervention (days/wks) 3 days or until goals met   Anticipated Discharge Disposition Home with Assist;Home with Home Therapy   Risk & Benefits of therapy have been explained Yes   Patient, Family & other staff in agreement with plan of care Yes   Clinical Impression Comments Patient mobilizing in room ok today, Would like to return home with help, and home PT if able and ready.    [facility] [employer] TM \"6 Clicks\"   2016, Trustees of [facility], under license to [employer].  All rights reserved.   6 Clicks Short Forms Basic Mobility Inpatient Short Form   [facility] [email or web address]  \"6 Clicks\" V.2 Basic Mobility Inpatient Short Form   1. Turning from your back to your side while in a flat bed without using bedrails? 4 - None   2. Moving from lying on your back to sitting on the side of " a flat bed without using bedrails? 4 - None   3. Moving to and from a bed to a chair (including a wheelchair)? 3 - A Little   4. Standing up from a chair using your arms (e.g., wheelchair, or bedside chair)? 3 - A Little   5. To walk in hospital room? 3 - A Little   6. Climbing 3-5 steps with a railing? 3 - A Little   Basic Mobility Raw Score (Score out of 24.Lower scores equate to lower levels of function) 20   Total Evaluation Time   Total Evaluation Time (Minutes) 20

## 2018-02-23 NOTE — CONSULTS
Care Transition Initial Assessment - RN  Reason For Consult: discharge planning   Met with: Patient and Family.    DATA   Principal Problem:    Acute pulmonary embolism (H)  Active Problems:    Essential hypertension, benign    CKD (chronic kidney disease) stage 4, 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)    Diabetes type 2, uncontrolled (H)    Cardiac pacemaker in situ    Weakness    Elevated lactic acid level    Acute kidney injury (H)    Coronary artery disease involving native coronary artery - angio in 2006 with multivessel disease managed medically    Hypoxia    Pulmonary hypertension    Anemia in chronic kidney disease    Syncope    Acute encephalopathy - possible    MRSA (methicillin resistant staph aureus) culture positive       [facility]  Primary Care MD Name: Ila Berkowitz  Contact information and PCP information verified: Yes      ASSESSMENT  Cognitive Status: awake, alert and oriented.       Resources List: Home Care     Lives With: child(joseph), adult  Living Arrangements: apartment  Quality Of Family Relationships: supportive, involved  Description of Support System: Supportive, Involved   Who is your support system?: Children   Support Assessment: Adequate family and caregiver support   Insurance Concerns: No Insurance issues identified          This writer met with pt family, introduced self and role. Panchito and Qian Gotti currently live with patient in his apartment so he has 24/7 care at home and they would like to bring him home again with Rachelle Mckeon [facility] care RN,HHA PT. Family very is very supportive and if patient is mobile and back to baseline at discharge he will return home.      PLAN    Discharge home with family help and Rachelle Mckeon C RN HHA PT      Maria Alejandra Leong CTS RN [facility] [phone] [facility] [phone]    Discharge Planner   Discharge Plans in progress: Home with [facility] and family support  Barriers to discharge plan: mobility back to base line  Follow up plan: Clinic  follow up, HHC and CC referral.       Entered by: Maria Alejandra Leong 02/23/2018 11:45 AM

## 2018-02-23 NOTE — PROGRESS NOTES
"S-(situation): Patient registered to Observation. Patient arrived to room 265 via cart from ED    B-(background): Patient from home with son and d-I-l, became syncopal while trying to get up to the bathroom at home.    Arrived to [facility] for observation and possible placement at discharge.    A-(assessment): On oxymask at 10 L, titrated down to 4 L.  Vitals are WNL.  Patient is pale resting with eyes closed.   Very [place], pocket talker in use for hearing assistance. Nods that \"yes\" he can hear patient. Patient able to hear writer, nods or answers some questions, but asks writer to be quiet since he can't hear anyway.    R-(recommendations): Orders and observation goals reviewed with patient.    Nursing Observation criteria listed below was met:    Skin issues/needs documented:NA  Isolation needs addressed, if appropriate: YES, known MRSA  Fall Prevention: Education given and documented: Yes  Education Assessment documented:Yes  Education Documented (Pre-existing chronic infection such as, MRSA/VRE need education on admission): Yes  New medication patient education completed and documented (Possible Side Effects of Common Medications handout): No  Home medications if not able to send immediately home with family stored here: NA  Reminder note placed in discharge instructions: NA  Patient has discharge needs (If yes, please explain): Yes [facility].  Social service consult made for this admission.             "

## 2018-02-23 NOTE — PROGRESS NOTES
SPIRITUAL HEALTH SERVICES  SPIRITUAL ASSESSMENT Progress Note  [facility]      Pt request - (Pt known to  from previous hospitalization.)   provided a prayer to pt.   is available for pt/family needs.    Rahul Cervantes M.Div., [facility]  Staff   Office tel: [phone]

## 2018-02-23 NOTE — H&P
[facility]    History and Physical  Hospitalist       Date of Admission:  2/22/2018  Date of Service (when I saw the patient): 02/23/18    Assessment & Plan   Shaun Gotti is a 89 year old male who presents with symptoms, signs, and test results concerning for acute pulmonary embolism with syncope, moderate to severe hypoxia, acute kidney injury, acute change in mentation consistent with an acute encephalopathy, and severely elevated lactic acid level consistent with tissue hypoperfusion and tissue hypoxia.  He is at high risk for an adverse outcome, so ongoing hospitalization is considered medically necessary.  Based on the presently available information, hospitalization for at least 2 midnights is anticipated.    Principal Problem:    Acute pulmonary embolism (H)    Assessment: Typical clinical symptoms and signs with high probability VQ scan results is consistent with acute PE, appears unprovoked and spontaneous although recent decrease in physical activity is probably contributed factor, will need IV heparin anticoagulation because of his impaired renal function until INR has become therapeutic after starting warfarin such that he will likely require several days of hospitalization, does not appear to be a candidate for thrombolytic therapy    Plan: Start IV heparin with bolus and continuous infusion and titrate according to protocol, start warfarin and anticipate at least a 3 month course of anticoagulation treatment, monitor hemodynamic status and oxygenation closely with low threshold to obtain an echocardiogram if there is concern for worsening RV failure    Active Problems:    Diabetes type 2, uncontrolled (H)    Assessment: A1c 7.5, blood sugars 133-165 so far here in the hospital, using insulin chronically    Plan: Follow blood sugars and titrate insulin dosing with Lantus and NovoLog depending upon blood sugar test results      Weakness    Assessment: Probably  multifactorial but acute PE with hypoxia and suspected orthostasis is the most likely acute medical issue causing weakness    Plan: Treat PE and hypoxia, support hemodynamic status with IV fluids      Elevated lactic acid level    Assessment: Severely elevated initially, improved with IV fluid resuscitation, probably multifactorial including severe hypoxia and tissue hypoperfusion from acute PE, doubt infection or sepsis at this time    Plan: Support oxygenation and hemodynamic status, reconsider additional evaluation and treatment for infection if there is increasing clinical suspicion for it      Acute kidney injury (H)    Assessment: Significantly increased creatinine compared with his usual baseline of about 2.5 probably due to hypoperfusion and hypoxia from acute PE, indeterminant FENa for ATN and relatively inactive urine sediment aside from proteinuria, no evidence for obstructive uropathy on renal ultrasound, not presently hyperkalemic, mildly uremic and trending toward mild metabolic acidosis, relatively oliguric so far    Plan: Follow urine output and renal function closely, follow acid base status, follow voiding capacity and reconsider bladder catheterization if he develops urinary retention      Hypoxia    Assessment: Severe hypoxia including PaO2 to FiO2 ratio 281, signs of no overt respiratory distress although his impaired mental status may mask a typical physiologic response to severe hypoxia, does have respiratory alkalosis likely due to severe hypoxia, due to acute PE    Plan: Treat PE, provide supplemental oxygen to keep saturations 90% and higher, reconsider high flow nasal cannula oxygen or assisted ventilation with BiPAP if necessary      Pulmonary hypertension    Assessment: Chronic although could be acutely exacerbated in the context of significant PE    Plan: Cautious IV fluid therapy as he may have a tendency for volume overload      Anemia in chronic kidney disease    Assessment:  Relatively stable so far but exacerbates tissue hypoxia in the context of severe hypoxemia from acute PE    Plan: Follow hemoglobin, check stool for occult blood      Syncope    Assessment: Episode at home and near syncope in the ER last evening as well probably due to PE    Plan: Support hemodynamics with IV fluids, start anticoagulation, begin to advance activity as he stabilizes      Acute encephalopathy - possible    Assessment: Acute change in mentation reported by his family with decreased responsiveness and obvious improvement today after treatment started overnight is consistent with an acute encephalopathy present yesterday when he initially presented most likely due to hypoperfusion and hypoxia, no overt seizure activity or new focal neurologic deficits so far    Plan: Follow clinically, support oxygenation and hemodynamic status      Essential hypertension, benign    Assessment: Chronic with syncope and episodic hypotension in the context of acute PE    Plan: Hold antihypertensive medications for now      CKD (chronic kidney disease) stage 4, 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)    Assessment: Worse compared with his baseline    Plan: As above      Cardiac pacemaker in situ    Assessment: Chronic    Plan: No acute interventions      Coronary artery disease involving native coronary artery - angio in 2006 with multivessel disease managed medically    Assessment: Chronic without concern for an acute coronary syndrome at this time    Plan: Follow clinically      MRSA (methicillin resistant staph aureus) culture positive    Assessment: Previous history of same    Plan: Contact isolation    Pain plan: # Pain Assessment:   Current Pain Score 2/23/2018 1/12/2018 1/11/2018   Patient currently in pain? sleeping: patient not able to self report denies denies   Pain score (0-10) - - -   Shaun s pain level was assessed and he currently denies pain.      DVT Prophylaxis: Receiving therapeutic anticoagulation  Code Status: DNR /  DNI    Disposition: Expected discharge in 5-7 days once INR has been therapeutic after starting warfarin for 2 consecutive days while he has been receiving other anticoagulation with IV heparin.    Kurt Carrillo    Primary Care Physician   Sony Berkowitz    Chief Complaint   Weakness    History is obtained from the patient, discussion with Dr. Vann who had seen the patient yesterday, electronic health record and patient's family    History of Present Illness   Shaun Gotti is a 89 year old male who presents with worsening weakness in the last 1-2 days.  He had been hospitalized about a month ago for weakness and subsequently discharged to a TCU for rehabilitation.  He returned home about 10 days ago.  Although he had been sleeping frequently during the days, he was waking to eat and he was able to transfer himself from bed and even ambulate short distances and use the bathroom independently up until yesterday.  Yesterday, his family noticed that he continued to be tired but was so weak that he could not get out of bed without more assistance than usual and he was unable to walk to the bathroom.  After getting to the bathroom in a wheelchair, he was so weak that he could not get out of the wheelchair to continue his personal cares.  His daughter-in-law reports that the patient appeared unresponsive for a few minutes but ultimately came to without specific intervention.  With these concerns, they presented to the emergency room yesterday for evaluation.  After initial evaluation in the emergency room, hospitalization for observation was advised.  While in the emergency room, he did receive over 2 L of IV fluids and an additional liter of IV fluids overnight here in the hospital.  Also while he was in the emergency room, he apparently tried to get up off his bed in the emergency room with help and became pale and diaphoretic and appeared near syncopal at which time his systolic blood pressure was noted to have  dropped about 40 points and he was transiently hypotensive.  He was returned to bed and those symptoms and hypotension resolved.  He was also noted to be severely hypoxic in the ER and was started on oxygen therapy.  Overnight here in the hospital, family reports that he seems to have improved.  This morning he appears more awake and appears to have better color today according to his family.  Early this morning he underwent V/Q testing which was abnormal with high probability for acute PE, so he was started on IV heparin.  He is now seen in his hospital room for evaluation.  He presently denies any pain including chest pain.  He denies any dyspnea or lightheadedness.    Past Medical History    I have reviewed this patient's medical history and updated it with pertinent information if needed.   Past Medical History:   Diagnosis Date     Bronchitis, not specified as acute or chronic 09/26/10    Admitted. Lower respiratory infection, severe weakness.     Chronic kidney disease (CKD)      Coronary atherosclerosis of native coronary artery 1/22/2010    Admitted. Syncopal spell and substernal chest pain.     Coronary atherosclerosis of unspecified type of vessel, native or graft      Hernia of unspecified site of abdominal cavity without mention of obstruction or gangrene     left inguinal hernia     Other and unspecified hyperlipidemia      Prostatitis, unspecified     Chronic prostatitis     Type II or unspecified type diabetes mellitus without mention of complication, not stated as uncontrolled      Unspecified essential hypertension      He does not have any previous history of venous thromboembolism.  He has no known history of malignancy.    He does have history of stage IV chronic kidney disease with baseline creatinine about 2.5.    Past Surgical History   I have reviewed this patient's surgical history and updated it with pertinent information if needed.  Past Surgical History:   Procedure Laterality Date      CARDIAC CATHERIZATION  10/23/2006    [facility]     HC REMOVAL GALLBLADDER      Cholecystectomy     HC S&I FLUORO PACEMAKER       HERNIA REPAIR         Prior to Admission Medications   Prior to Admission Medications   Prescriptions Last Dose Informant Patient Reported? Taking?   Multiple Vitamins-Minerals (PRESERVISION AREDS 2 PO) 2018 at AM  Yes Yes   Sig: Take 2 tablets by mouth daily One in AM; one in PM   ORDER FOR DME   No No   Sig: Equipment being ordered: 4 wheel walker with brakes and seat   ORDER FOR DME   No No   Sig: Equipment being ordered: walker   ORDER FOR DME   No No   Sig: Equipment being ordered: transfer bridge   acetaminophen (TYLENOL) 325 MG tablet Past Week at Unknown time  No Yes   Sig: Take 2 tablets (650 mg) by mouth every 4 hours as needed for mild pain   amLODIPine (NORVASC) 10 MG tablet 2018 at AM  No Yes   Sig: Take 1 tablet (10 mg) by mouth daily   aspirin 81 MG tablet 2018 at AM  No Yes   Sig: Take 1 tablet (81 mg) by mouth daily   atorvastatin (LIPITOR) 20 MG tablet 2018 at AM  No Yes   Sig: Take 1 tablet (20 mg) by mouth daily   clopidogrel (PLAVIX) 75 MG tablet 2018 at AM  No Yes   Si TABLET DAILY   finasteride (PROSCAR) 5 MG tablet 2018 at AM  No Yes   Sig: Take 1 tablet (5 mg) by mouth daily   hypromellose (GENTEAL) 0.3 % SOLN 2018 at 1400  Yes Yes   Si drop 4 times daily   insulin glargine (LANTUS) 100 UNIT/ML injection 2018 at AM  No Yes   Sig: Inject 8 Units Subcutaneous every morning   Patient taking differently: Inject 7 Units Subcutaneous every morning    metoprolol succinate (TOPROL-XL) 50 MG 24 hr tablet 2018 at AM  No Yes   Sig: Take 1 tablet (50 mg) by mouth daily   nitroGLYcerin (NITROSTAT) 0.4 MG sublingual tablet Unknown at Unknown time  No No   Si TAB EVERY 5 MIN AS NEEDED FOR CHEST PAIN, UP TO 3 PER EPISODE   omeprazole (PRILOSEC) 20 MG CR capsule 2018 at AM  No Yes   Si CAPSULE DAILY   order  for DME   No No   Sig: Equipment being ordered: Oxygen - patient needs 1L NC oxygen continuously during transportation to rehab facility.   sertraline (ZOLOFT) 25 MG tablet 2/22/2018 at AM  No Yes   Sig: Take 1 tablet (25 mg) by mouth daily   tamsulosin (FLOMAX) 0.4 MG capsule 2/21/2018 at PM  No Yes   Sig: Take 1 capsule (0.4 mg) by mouth At Bedtime      Facility-Administered Medications: None     Allergies   Allergies   Allergen Reactions     Fluoxetine      Lisinopril        Social History   I have reviewed this patient's social history and updated it with pertinent information if needed. Shaun Gotti  reports that he has never smoked. He has never used smokeless tobacco. He reports that he does not drink alcohol or use illicit drugs.  Since he returned home from the TCU about 10 days ago, he has been living with his son and daughter-in-law who have been assisting him with daily cares.  Up until yesterday, he had been able to ambulate independently using a walker.  He can normally feed himself and do some of his personal hygiene.    Family History   I have reviewed this patient's family history and updated it with pertinent information if needed.   Family History   Problem Relation Age of Onset     CANCER Father      prostate cancer     There is no known family history of thrombophilia.    Review of Systems   CONSTITUTIONAL:  negative for  fevers and chills  EYES:  negative for  eye discharge and visual disturbance  HEENT:  negative for  nasal congestion and sore throat  RESPIRATORY:  positive for dyspnea  negative for  dry cough and cough with sputum  CARDIOVASCULAR:  negative for  chest pain, palpitations, orthopnea, or worsening edema recently  GASTROINTESTINAL:  negative for nausea, vomiting, change in bowel habits and abdominal pain, positive for recent white color to his stools  GENITOURINARY:  negative for dysuria and hematuria, has been voiding relatively normally at home usually waking up twice at  night to urinate  INTEGUMENT/BREAST:  negative for rash  HEMATOLOGIC/LYMPHATIC:  negative for bleeding  ALLERGIC/IMMUNOLOGIC:  positive for drug reactions  MUSCULOSKELETAL:  negative for  arthralgias, joint swelling and recent leg pain or swelling of which he is aware  NEUROLOGICAL:  positive for speech problems with slurred speech this morning noticed by his family which is new compared with yesterday and weakness, also notable for increased somnolence and confusion yesterday  negative for headaches    Physical Exam     Wt Readings from Last 1 Encounters:   02/23/18 70.2 kg (154 lb 12.2 oz)     Wt Readings from Last 3 Encounters:   02/23/18 70.2 kg (154 lb 12.2 oz)   01/10/18 71.2 kg (156 lb 15.5 oz)   09/21/17 70.7 kg (155 lb 12.8 oz)     Current weight is remained the same compared with previously.    Vitals were reviewed  Patient Vitals for the past 24 hrs:   BP Temp Temp src Pulse Heart Rate Resp SpO2 Height Weight   02/23/18 1134 125/69 96.8  F (36  C) Axillary 88 - 21 95 % - -   02/23/18 0835 - - - - - - 94 % - -   02/23/18 0727 153/77 96.5  F (35.8  C) Oral 87 87 24 97 % - -   02/23/18 0447 - - - - - - 95 % - -   02/23/18 0402 149/85 97.5  F (36.4  C) Oral - 89 20 96 % - -   02/23/18 0009 143/78 97.2  F (36.2  C) Oral - 89 20 - - 70.2 kg (154 lb 12.2 oz)   02/22/18 2253 - - - - 87 16 98 % - -   02/22/18 2230 125/76 - - - 89 21 92 % - -   02/22/18 2215 126/75 - - - 87 21 95 % - -   02/22/18 2200 125/69 - - - 87 24 95 % - -   02/22/18 2145 118/72 - - - 84 23 - - -   02/22/18 2130 113/76 - - - 85 25 96 % - -   02/22/18 2115 110/65 - - - 82 25 95 % - -   02/22/18 2100 125/72 - - - 98 (!) 33 - - -   02/22/18 2045 (!) 81/71 - - - 90 14 93 % - -   02/22/18 2030 115/78 - - - 89 20 92 % - -   02/22/18 2015 - - - - 91 21 94 % - -   02/22/18 2000 - - - - 92 11 93 % - -   02/22/18 1945 - - - - 93 21 93 % - -   02/22/18 1930 - - - - 92 23 97 % - -   02/22/18 1915 - - - - 92 21 93 % - -   02/22/18 1900 - - - - 92 13 94 %  - -   02/22/18 1845 120/80 - - - 95 28 93 % - -   02/22/18 1830 122/73 - - - 92 21 94 % - -   02/22/18 1815 106/65 - - - 91 22 93 % - -   02/22/18 1800 119/73 - - - - 23 (!) 82 % - -   02/22/18 1715 116/61 - - - 101 23 93 % - -   02/22/18 1700 102/61 - - - 101 21 96 % - -   02/22/18 1645 96/63 - - - 97 23 96 % - -   02/22/18 1630 112/61 - - - 96 22 97 % - -   02/22/18 1615 124/68 - - - 98 20 100 % - -   02/22/18 1613 105/65 - - - - - - - -   02/22/18 1607 - 96.9  F (36.1  C) Temporal 86 86 20 (!) 86 % - 65.8 kg (145 lb)       Constitutional: Very thin, pale, ill-appearing elderly man who does not appear to be in acute distress while lying in bed  Eyes: Anicteric sclerae, clear conjunctivae, symmetric pupils  HEENT: No ear or nasal drainage, tacky mucous membranes of the oropharynx without oral lesions  Neck: Trachea midline, symmetric, nontender, no stridor  Lymph/Hematologic: No cervical or supraclavicular lymphadenopathy  Chest: Very thin with loss of muscle mass in the chest and upper body, subcutaneous device palpable in the left upper chest   Respiratory: Mildly tachypneic with otherwise normal respiratory effort, mildly diminished breath sounds throughout, clear lung fields  Cardiovascular: Regular rate and rhythm, strong radial pulses but sluggish 2-3 second capillary refill in the fingers and toes  GI: Nondistended abdomen, normal bowel sounds, soft abdomen without tenderness  Skin: Pale color, no rashes evident  Musculoskeletal: Mild pitting edema in the distal lower legs and feet bilaterally, no cords or tenderness in the lower extremities, hands and feet are cool to touch  Neurologic: Alert and maintains wakefulness and attention, hard of hearing but responds well using the pocket talker, no overt focal neurologic deficits, speech is easy to understand currently, appears to have no recall for the events of yesterday at home and in the ER, oriented to person and place    Data   Data reviewed today:  I  personally reviewed no images or EKG's today.    Recent Labs  Lab 02/23/18  0727 02/23/18  0030 02/22/18  2120 02/22/18  1620   WBC  --   --   --  5.3   HGB 8.3*  --  8.4* 9.0*   MCV  --   --   --  97   PLT  --   --   --  198   NA  --  139  --  138   POTASSIUM  --  4.9  --  4.8   CHLORIDE  --  108  --  104   CO2  --  21  --  18*   BUN  --  49*  --  49*   CR  --  4.01*  --  4.22*   ANIONGAP  --  10  --  16*   RAHEL  --  8.3*  --  8.9   GLC  --  165*  --  221*   ALBUMIN  --   --   --  2.5*   PROTTOTAL  --   --   --  7.5   BILITOTAL  --   --   --  0.5   ALKPHOS  --   --   --  154*   ALT  --   --   --  29   AST  --   --   --  49*   LIPASE  --   --   --  54*   TROPI  --   --   --  <0.015       Results for orders placed or performed during the hospital encounter of 02/22/18 (from the past 24 hour(s))   CBC with platelets differential   Result Value Ref Range    WBC 5.3 4.0 - 11.0 10e9/L    RBC Count 3.17 (L) 4.4 - 5.9 10e12/L    Hemoglobin 9.0 (L) 13.3 - 17.7 g/dL    Hematocrit 30.8 (L) 40.0 - 53.0 %    MCV 97 78 - 100 fl    MCH 28.4 26.5 - 33.0 pg    MCHC 29.2 (L) 31.5 - 36.5 g/dL    RDW 14.3 10.0 - 15.0 %    Platelet Count 198 150 - 450 10e9/L    Diff Method Automated Method     % Neutrophils 71.0 %    % Lymphocytes 15.4 %    % Monocytes 9.6 %    % Eosinophils 3.6 %    % Basophils 0.2 %    % Immature Granulocytes 0.2 %    Absolute Neutrophil 3.8 1.6 - 8.3 10e9/L    Absolute Lymphocytes 0.8 0.8 - 5.3 10e9/L    Absolute Monocytes 0.5 0.0 - 1.3 10e9/L    Absolute Eosinophils 0.2 0.0 - 0.7 10e9/L    Absolute Basophils 0.0 0.0 - 0.2 10e9/L    Abs Immature Granulocytes 0.0 0 - 0.4 10e9/L   Comprehensive metabolic panel   Result Value Ref Range    Sodium 138 133 - 144 mmol/L    Potassium 4.8 3.4 - 5.3 mmol/L    Chloride 104 94 - 109 mmol/L    Carbon Dioxide 18 (L) 20 - 32 mmol/L    Anion Gap 16 (H) 3 - 14 mmol/L    Glucose 221 (H) 70 - 99 mg/dL    Urea Nitrogen 49 (H) 7 - 30 mg/dL    Creatinine 4.22 (H) 0.66 - 1.25 mg/dL    GFR  Estimate 13 (L) >60 mL/min/1.7m2    GFR Estimate If Black 16 (L) >60 mL/min/1.7m2    Calcium 8.9 8.5 - 10.1 mg/dL    Bilirubin Total 0.5 0.2 - 1.3 mg/dL    Albumin 2.5 (L) 3.4 - 5.0 g/dL    Protein Total 7.5 6.8 - 8.8 g/dL    Alkaline Phosphatase 154 (H) 40 - 150 U/L    ALT 29 0 - 70 U/L    AST 49 (H) 0 - 45 U/L   Lipase   Result Value Ref Range    Lipase 54 (L) 73 - 393 U/L   Lactic acid whole blood   Result Value Ref Range    Lactic Acid 4.6 (HH) 0.7 - 2.0 mmol/L   TSH with free T4 reflex   Result Value Ref Range    TSH 6.14 (H) 0.40 - 4.00 mU/L   Troponin I   Result Value Ref Range    Troponin I ES <0.015 0.000 - 0.045 ug/L   Blood culture   Result Value Ref Range    Specimen Description Blood     Special Requests Left Arm  [facility]       Culture Micro No growth after 14 hours    T4 free   Result Value Ref Range    T4 Free 1.03 0.76 - 1.46 ng/dL   Nt probnp inpatient   Result Value Ref Range    N-Terminal Pro BNP Inpatient 3711 (H) 0 - 1800 pg/mL   Iron and iron binding capacity   Result Value Ref Range    Iron 28 (L) 35 - 180 ug/dL    Iron Binding Cap 181 (L) 240 - 430 ug/dL    Iron Saturation Index 15 15 - 46 %   Ferritin   Result Value Ref Range    Ferritin 480 (H) 26 - 388 ng/mL   Reticulocyte count   Result Value Ref Range    % Retic 1.1 0.5 - 2.0 %    Absolute Retic 31.2 25 - 95 10e9/L   D dimer quantitative   Result Value Ref Range    D Dimer >20.0 (H) 0.0 - 0.50 ug/ml FEU   ABO/Rh type and screen   Result Value Ref Range    ABO O     RH(D) Pos     Antibody Screen Neg     Test Valid Only At [facility]        Specimen Expires 02/25/2018    XR Chest 2 Views    Narrative    XR CHEST 2 VW 2/22/2018 5:29 PM    HISTORY: Cough.    COMPARISON: January 10, 2018.      Impression    IMPRESSION: Left-sided cardiac device in place with leads unchanged in  position. Mild venous congestion, similar to prior exam. Otherwise, no  new focal pulmonary opacities. No pleural effusions or  pneumothorax.  Stable heart size.    BURT REYNOLDS MD   UA reflex to Microscopic   Result Value Ref Range    Color Urine Yellow     Appearance Urine Slightly Cloudy     Glucose Urine 50 (A) NEG^Negative mg/dL    Bilirubin Urine Negative NEG^Negative    Ketones Urine Negative NEG^Negative mg/dL    Specific Gravity Urine 1.014 1.003 - 1.035    Blood Urine Negative NEG^Negative    pH Urine 5.0 5.0 - 7.0 pH    Protein Albumin Urine 100 (A) NEG^Negative mg/dL    Urobilinogen mg/dL 2.0 0.0 - 2.0 mg/dL    Nitrite Urine Negative NEG^Negative    Leukocyte Esterase Urine Negative NEG^Negative    Source Catheterized Urine     RBC Urine 1 0 - 2 /HPF    WBC Urine 1 0 - 2 /HPF    Bacteria Urine Few (A) NEG^Negative /HPF    Hyaline Casts 1 0 - 2 /LPF    Amorphous Crystals Few (A) NEG^Negative /HPF   Blood gas venous   Result Value Ref Range    Ph Venous 7.35 7.32 - 7.43 pH    PCO2 Venous 42 40 - 50 mm Hg    PO2 Venous 31 25 - 47 mm Hg    Bicarbonate Venous 23 21 - 28 mmol/L    Base Deficit Venous 2.4 mmol/L    FIO2 36    Lactic acid whole blood   Result Value Ref Range    Lactic Acid 1.7 0.7 - 2.0 mmol/L   Fractional excretion of sodium   Result Value Ref Range    Creatinine Urine 132 mg/dL    Sodium Urine mmol/L 47 mmol/L    %FENA 1.1 %   Blood culture   Result Value Ref Range    Specimen Description Blood Left Arm     Culture Micro No growth after 9 hours    Hemoglobin   Result Value Ref Range    Hemoglobin 8.4 (L) 13.3 - 17.7 g/dL   Glucose by meter   Result Value Ref Range    Glucose 166 (H) 70 - 99 mg/dL   Basic metabolic panel   Result Value Ref Range    Sodium 139 133 - 144 mmol/L    Potassium 4.9 3.4 - 5.3 mmol/L    Chloride 108 94 - 109 mmol/L    Carbon Dioxide 21 20 - 32 mmol/L    Anion Gap 10 3 - 14 mmol/L    Glucose 165 (H) 70 - 99 mg/dL    Urea Nitrogen 49 (H) 7 - 30 mg/dL    Creatinine 4.01 (H) 0.66 - 1.25 mg/dL    GFR Estimate 14 (L) >60 mL/min/1.7m2    GFR Estimate If Black 17 (L) >60 mL/min/1.7m2    Calcium 8.3  (L) 8.5 - 10.1 mg/dL   Blood gas arterial   Result Value Ref Range    pH Arterial 7.44 7.35 - 7.45 pH    pCO2 Arterial 28 (L) 35 - 45 mm Hg    pO2 Arterial 101 80 - 105 mm Hg    Bicarbonate Arterial 19 (L) 21 - 28 mmol/L    Base Deficit Art 4.1 mmol/L    FIO2 36    NM Lung vent and perf*   Result Value Ref Range    Radiologist flags High probability for pulmonary embolism (AA)     Narrative    NUCLEAR MEDICINE LUNG SCAN VENTILATION AND PERFUSION  2/23/2018 6:25  AM    HISTORY: Hypoxia. Elevated D-dimer.    COMPARISON: Chest radiograph performed 2/22/2018.    TECHNIQUE: Patient was administered 4.9 mCi Tc 99m MAA intravenously  prior to performing the perfusion images. Patient was then given  aerosolized 70 mCi Tc 99m DTPA for the ventilation images.     FINDINGS: There is a large mismatched segmental perfusion defect in  the right lower lobe of the lung. There is an additional large  mismatched segmental perfusion defect also noted in the lingula. There  are also likely several smaller subsegmental perfusion defects in the  left lung which also appear mismatched. Findings correspond to a high  probability for pulmonary embolism.      Impression    IMPRESSION: High probability (greater than 80%) for pulmonary  embolism.      [Critical Result: High probability for pulmonary embolism]    Finding was identified on 2/23/2018 7:18 AM.     Dr. Carrillo was contacted by me on 2/23/2018 7:27 AM and verbalized  understanding of the critical result.     CT ALICEA MD   Glucose by meter   Result Value Ref Range    Glucose 152 (H) 70 - 99 mg/dL   Basic metabolic panel   Result Value Ref Range    Sodium 140 133 - 144 mmol/L    Potassium 4.4 3.4 - 5.3 mmol/L    Chloride 109 94 - 109 mmol/L    Carbon Dioxide 20 20 - 32 mmol/L    Anion Gap 11 3 - 14 mmol/L    Glucose 133 (H) 70 - 99 mg/dL    Urea Nitrogen 48 (H) 7 - 30 mg/dL    Creatinine 3.87 (H) 0.66 - 1.25 mg/dL    GFR Estimate 15 (L) >60 mL/min/1.7m2    GFR Estimate If Black  18 (L) >60 mL/min/1.7m2    Calcium 8.2 (L) 8.5 - 10.1 mg/dL   Hemoglobin   Result Value Ref Range    Hemoglobin 8.3 (L) 13.3 - 17.7 g/dL   Hemoglobin A1c   Result Value Ref Range    Hemoglobin A1C 7.5 (H) 4.3 - 6.0 %   US Renal Complete    Narrative    RENAL ULTRASOUND   2/23/2018 9:44 AM     HISTORY: acute on chronic renal failure;     COMPARISON: None.    FINDINGS:  The kidneys are normal in size. The right kidney is of  normal cortical thickness. There is mild cortical thinning of left  kidney with a cortex measuring up to 1 cm in thickness on the left and  1.3 seen in thickness on the right..  No renal masses are seen.  There  is no hydronephrosis or renal calculus. Small amount of left  perinephric fluid is noted. Small simple appearing cyst in the  inferior pole right kidney measures up to 0.5 cm.    The visualized portions of the urinary bladder appear normal.    Prevoid urinary bladder volume is 149 mL. Patient was unable to void.   Bilateral ureteral jets are present.        Impression    IMPRESSION:    1. Small right renal cyst.  2. Trace left perinephric fluid of uncertain etiology. Is also mild  left renal cortical thinning.  3. Patient was unable to void leading up postvoid residual  approximately 150 mL.    FELICITY DOOLEY MD        Recent Results (from the past 24 hour(s))   XR Chest 2 Views    Narrative    XR CHEST 2 VW 2/22/2018 5:29 PM    HISTORY: Cough.    COMPARISON: January 10, 2018.      Impression    IMPRESSION: Left-sided cardiac device in place with leads unchanged in  position. Mild venous congestion, similar to prior exam. Otherwise, no  new focal pulmonary opacities. No pleural effusions or pneumothorax.  Stable heart size.    BURT REYNOLDS MD   NM Lung vent and perf*   Result Value    Radiologist flags High probability for pulmonary embolism (AA)    Narrative    NUCLEAR MEDICINE LUNG SCAN VENTILATION AND PERFUSION  2/23/2018 6:25  AM    HISTORY: Hypoxia. Elevated D-dimer.    COMPARISON:  Chest radiograph performed 2/22/2018.    TECHNIQUE: Patient was administered 4.9 mCi Tc 99m MAA intravenously  prior to performing the perfusion images. Patient was then given  aerosolized 70 mCi Tc 99m DTPA for the ventilation images.     FINDINGS: There is a large mismatched segmental perfusion defect in  the right lower lobe of the lung. There is an additional large  mismatched segmental perfusion defect also noted in the lingula. There  are also likely several smaller subsegmental perfusion defects in the  left lung which also appear mismatched. Findings correspond to a high  probability for pulmonary embolism.      Impression    IMPRESSION: High probability (greater than 80%) for pulmonary  embolism.      [Critical Result: High probability for pulmonary embolism]    Finding was identified on 2/23/2018 7:18 AM.     Dr. Carrillo was contacted by me on 2/23/2018 7:27 AM and verbalized  understanding of the critical result.     CT ALICEA MD   US Renal Complete    Narrative    RENAL ULTRASOUND   2/23/2018 9:44 AM     HISTORY: acute on chronic renal failure;     COMPARISON: None.    FINDINGS:  The kidneys are normal in size. The right kidney is of  normal cortical thickness. There is mild cortical thinning of left  kidney with a cortex measuring up to 1 cm in thickness on the left and  1.3 seen in thickness on the right..  No renal masses are seen.  There  is no hydronephrosis or renal calculus. Small amount of left  perinephric fluid is noted. Small simple appearing cyst in the  inferior pole right kidney measures up to 0.5 cm.    The visualized portions of the urinary bladder appear normal.    Prevoid urinary bladder volume is 149 mL. Patient was unable to void.   Bilateral ureteral jets are present.        Impression    IMPRESSION:    1. Small right renal cyst.  2. Trace left perinephric fluid of uncertain etiology. Is also mild  left renal cortical thinning.  3. Patient was unable to void leading up postvoid  residual  approximately 150 mL.    FELICITY DOOLEY MD     Blood cultures obtained yesterday are negative at less than 24 hours so far

## 2018-02-23 NOTE — H&P
[facility]    History and Physical  Hospitalist       Date of Admission:  2/22/2018  Date of Service (when I saw the patient): 02/22/18    Assessment & Plan       Active Problems:    Weakness    Assessment: etiology is unclear but could be related to anemia, hypoxia and worsening renal failure.  He does not have symptoms to suggest infection other than a mild cough.  His CXR and urine do not suggest an acute infection.  He does not have symptoms suggesting a stroke.  His EKG and troponin do not suggest an acute cardiac ischemia event.  Thyroid tests are normal.  Does not have known adrenal insufficiency.  He reportedly has had reasonably good po intake at home however his labs would suggest dehydration.  At this point he is not safe to return home    Plan: register to observation, address anemia, hypoxia and MARQUIS.  PT and SS consults tomorrow      Acute kidney injury (H)    Assessment: no evidence of UTI.  He could be volume depleted.  Consider an obstructive uropathy.  Recent echo showed well preserved EF.  No signs of sepsis.      Plan: check FENA, IV fluids, follow intake and output and repeat renal function tonight and tomorrow morning.  If not improving would then consider a renal ultrasound      Hypoxia    Assessment: has only mild SOB.  Had hypoxia at the time of discharge last month which was felt secondary to pulmonary HTN.  He does not have known COPD and is not wheezing on exam.  His CXR does not appear to show an infiltrate or pulmonary edema.  No CP to suggest PE.      Plan: support with oxygen, check D dimer and complete VQ if elevated.  Monitor pulse ox.  Will check RA ABG. Anticipate he is going to need oxygen at discharge      Anemia    Assessment: has worsened since discharge and that is despite him appearing volume depleted.  No history suggesting GIB.  Could be related to worsening renal function.      Plan: repeat hgb later tonight and transfuse if below 7.  Check  iron studies, retic count and stool occult blood      Elevated lactic acid level    Assessment: likely due to volume depletion and hypoxia.  Repeat lactic acid is now normal after being given fluids and oxygen    Plan: no need to follow      Pulmonary hypertension    Assessment: noted history    Plan: oxygen      Diabetes mellitus, type 2 (H)    Assessment: chronic    Plan: ADA diet, continue lantus and add correction scale novolog as needed      Essential hypertension, benign    Assessment: BP controlled to mildly low on admission    Plan: hold amlodipine for now and continue metoprolol with hold parameters      CKD (chronic kidney disease) stage 4, 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)    Assessment: as above    Plan: as above      Coronary artery disease involving native coronary artery - angio in 2006 with multivessel disease managed medically    Assessment: asymptomatic with normal EKG and normal troponin    Plan: continue ASA, plavix and beta blocker      # Pain Assessment:   Current Pain Score 1/12/2018 1/11/2018 1/11/2018   Patient currently in pain? denies denies no   Pain score (0-10) - - -   Shaun verdugo pain level was assessed and he currently denies pain.        DVT Prophylaxis: anticipate less than 24 hour stay  Code Status: DNR / DNI as discussed during his last hospitalization    Disposition: Expected discharge in 1 days once plan in place for safe disposition, stable hgb, improving creatinine and stable oxygen sats on oxygen.    Peter Vann MD    Primary Care Physician   Sony Berkowitz    Chief Complaint   Weak    History is obtained from the patient    History of Present Illness   Shaun Gotti is a 89 year old male who presents with weakness.  He was discharged last month after being hospitalized with weakness and hypoxia.  He was discharged to rehab and then discharged back home 10 days ago. He has been at home and his daughter has been living with him and taking care of him.  He seemed to be doing okay.   He was not discharged from rehab with oxygen.  He has been sleeping for the majority of the days.  He has been able to get out of bed with her assistance and the aid of a walker.  Today however he was not able to get out of bed and his daughter brought him to the bathroom and he was not able to get out of the wheelchair so he was brought to the ED. He admits to a mild cough.  He has not had significant SOB.  He denies CP, abdominal pain, vomiting, diarrhea, melena or hematochezia.  His stools have appeared almost white according to his daughter.   He has not had fever, chills or sweats although he says he is always cold.  He denies orthopnea and has not had any significant peripheral edema.    Past Medical History    I have reviewed this patient's medical history and updated it with pertinent information if needed.   Past Medical History:   Diagnosis Date     Bronchitis, not specified as acute or chronic 09/26/10    Admitted. Lower respiratory infection, severe weakness.     Chronic kidney disease (CKD)      Coronary atherosclerosis of native coronary artery 1/22/2010    Admitted. Syncopal spell and substernal chest pain.     Coronary atherosclerosis of unspecified type of vessel, native or graft      Hernia of unspecified site of abdominal cavity without mention of obstruction or gangrene     left inguinal hernia     Other and unspecified hyperlipidemia      Prostatitis, unspecified     Chronic prostatitis     Type II or unspecified type diabetes mellitus without mention of complication, not stated as uncontrolled      Unspecified essential hypertension        Past Surgical History   I have reviewed this patient's surgical history and updated it with pertinent information if needed.  Past Surgical History:   Procedure Laterality Date     CARDIAC CATHERIZATION  10/23/2006    [facility]     HC REMOVAL GALLBLADDER      Cholecystectomy     HC S&I FLUORO PACEMAKER       HERNIA REPAIR         Prior to Admission  Medications   Prior to Admission Medications   Prescriptions Last Dose Informant Patient Reported? Taking?   Multiple Vitamins-Minerals (PRESERVISION AREDS 2 PO) 2018 at AM  Yes Yes   Sig: Take 2 tablets by mouth daily One in AM; one in PM   ORDER FOR DME   No No   Sig: Equipment being ordered: 4 wheel walker with brakes and seat   ORDER FOR DME   No No   Sig: Equipment being ordered: walker   ORDER FOR DME   No No   Sig: Equipment being ordered: transfer bridge   acetaminophen (TYLENOL) 325 MG tablet Past Week at Unknown time  No Yes   Sig: Take 2 tablets (650 mg) by mouth every 4 hours as needed for mild pain   amLODIPine (NORVASC) 10 MG tablet 2018 at AM  No Yes   Sig: Take 1 tablet (10 mg) by mouth daily   aspirin 81 MG tablet 2018 at AM  No Yes   Sig: Take 1 tablet (81 mg) by mouth daily   atorvastatin (LIPITOR) 20 MG tablet 2018 at AM  No Yes   Sig: Take 1 tablet (20 mg) by mouth daily   clopidogrel (PLAVIX) 75 MG tablet 2018 at AM  No Yes   Si TABLET DAILY   finasteride (PROSCAR) 5 MG tablet 2018 at AM  No Yes   Sig: Take 1 tablet (5 mg) by mouth daily   hypromellose (GENTEAL) 0.3 % SOLN 2018 at 1400  Yes Yes   Si drop 4 times daily   insulin glargine (LANTUS) 100 UNIT/ML injection 2018 at AM  No Yes   Sig: Inject 8 Units Subcutaneous every morning   Patient taking differently: Inject 7 Units Subcutaneous every morning    metoprolol succinate (TOPROL-XL) 50 MG 24 hr tablet 2018 at AM  No Yes   Sig: Take 1 tablet (50 mg) by mouth daily   nitroGLYcerin (NITROSTAT) 0.4 MG sublingual tablet Unknown at Unknown time  No No   Si TAB EVERY 5 MIN AS NEEDED FOR CHEST PAIN, UP TO 3 PER EPISODE   omeprazole (PRILOSEC) 20 MG CR capsule 2018 at AM  No Yes   Si CAPSULE DAILY   order for DME   No No   Sig: Equipment being ordered: Oxygen - patient needs 1L [place] oxygen continuously during transportation to rehab facility.   sertraline (ZOLOFT) 25 MG tablet  2/22/2018 at AM  No Yes   Sig: Take 1 tablet (25 mg) by mouth daily   tamsulosin (FLOMAX) 0.4 MG capsule 2/21/2018 at PM  No Yes   Sig: Take 1 capsule (0.4 mg) by mouth At Bedtime      Facility-Administered Medications: None     Allergies   Allergies   Allergen Reactions     Fluoxetine      Lisinopril        Social History   I have reviewed this patient's social history and updated it with pertinent information if needed. Shaun Gotti  reports that he has never smoked. He has never used smokeless tobacco. He reports that he does not drink alcohol or use illicit drugs.    Family History   I have reviewed this patient's family history and updated it with pertinent information if needed.   Family History   Problem Relation Age of Onset     CANCER Father      prostate cancer       Review of Systems   The 10 point Review of Systems is negative other than noted in the HPI or here.     Physical Exam   Temp: 96.9  F (36.1  C) Temp src: Temporal BP: 120/80 Pulse: 86 Heart Rate: 93 Resp: 21 SpO2: 93 % O2 Device: Nasal cannula Oxygen Delivery: 3 LPM  Vital Signs with Ranges  Temp:  [96.9  F (36.1  C)] 96.9  F (36.1  C)  Pulse:  [86] 86  Heart Rate:  [] 93  Resp:  [13-28] 21  BP: ()/(61-80) 120/80  SpO2:  [82 %-100 %] 93 %  145 lbs 0 oz    Constitutional:   Somnolent but arouses easily, cooperative, no apparent distress, and appears stated age     Eyes:   Lids and lashes normal, pupils equal, round and reactive to light, extra ocular muscles intact, sclera clear, conjunctiva normal     ENT:   Normocephalic, without obvious abnormality, atraumatic, sinuses nontender on palpation, external ears without lesions, oral pharynx with moist mucous membranes, tonsils without erythema or exudates, gums normal and good dentition.     Neck:   Supple, symmetrical, trachea midline, no adenopathy, thyroid symmetric, not enlarged and no tenderness, skin normal     Hematologic / Lymphatic:   no cervical lymphadenopathy and no  supraclavicular lymphadenopathy     Back:   Symmetric, no curvature, spinous processes are non-tender on palpation, paraspinous muscles are non-tender on palpation, no costal vertebral tenderness     Lungs:   No increased work of breathing, good air exchange, clear to auscultation bilaterally except for fine rales in both bases, no wheezing     Cardiovascular:   Normal apical impulse, regular rate and rhythm, normal S1 and S2, no S3 or S4, and no murmur noted     Abdomen:   normal bowel sounds, soft, non-distended, non-tender, no masses palpated, no hepatosplenomegally     Neurologic:   Somnolent but arouses easily and converses appropriately, oriented to name, place and time.  Cranial nerves II-XII are grossly intact.  Motor is 5 out of 5 bilaterally to  strength and dorsi and plantar flexion of his feet.    Sensory is intact to light touch throughout       Data   Data reviewed today:  I personally reviewed the EKG tracing showing sinus tachycardia without acute ischemic changes.    Recent Labs  Lab 02/22/18  1620   WBC 5.3   HGB 9.0*   MCV 97         POTASSIUM 4.8   CHLORIDE 104   CO2 18*   BUN 49*   CR 4.22*   ANIONGAP 16*   RAHEL 8.9   *   ALBUMIN 2.5*   PROTTOTAL 7.5   BILITOTAL 0.5   ALKPHOS 154*   ALT 29   AST 49*   LIPASE 54*   TROPI <0.015       Recent Results (from the past 24 hour(s))   XR Chest 2 Views    Narrative    XR CHEST 2 VW 2/22/2018 5:29 PM    HISTORY: Cough.    COMPARISON: January 10, 2018.      Impression    IMPRESSION: Left-sided cardiac device in place with leads unchanged in  position. Mild venous congestion, similar to prior exam. Otherwise, no  new focal pulmonary opacities. No pleural effusions or pneumothorax.  Stable heart size.    BURT REYNOLDS MD

## 2018-02-23 NOTE — PROGRESS NOTES
Patient oxygen via mask to 3 L.  He is more awake when compared to his arrival. He denies shortness of air or other complaints.    Uses call light appropriately but denies knowledge of where he is.   Vitals are stable.   He declines assistance to reposition, but seems to move well in bed.   Denies need to use the bathroom, pad is dry at this time.

## 2018-02-23 NOTE — PLAN OF CARE
Problem: Patient Care Overview  Goal: Plan of Care/Patient Progress Review  Discharge Planner PT   Patient plan for discharge: Home care  Current status: Patient is a 89 year old year old male admitted for weakness, suspect PE.  Prior to admission, patient was living in his apartment [address] ([facility], family has stayed with home) with no stairs to enter, using walker for mobility, and requiring min assist for ADLs. Currently, patient is requiring CGA assistance for functional mobility and CGA to min A for ambulation using walker in his room. Vitals stable with tx today. Demonstrating some confusion to place, time and events but follows commands well.  Barriers to  return to previous living situation include none if having assist. Patient has all necessary equipment.  Recommend discharge to home with assist and starting home PT services.    Barriers to return to prior living situation: Usually lives alone but family can and has been staying with him.   Recommendations for discharge: home with home care  Rationale for recommendations: As above       Entered by: Dia Leong 02/23/2018 12:52 PM        Dia Leong................... PT, DPT, CLT   2/23/2018, 12:55 PM  [phone]

## 2018-02-23 NOTE — ED NOTES
Patient up to the commode with 2 assist. Patient became very pale and sweaty. Systolic blood pressure dropped 40 points. Provider called.

## 2018-02-23 NOTE — PROGRESS NOTES
S-(situation): Patient changed to inpatient status    B-(background): Patient status change due to observation goals not being met.     A-(assessment): /77 HR 80's, 3L oxymask, afebrile. Lungs diminished, patient is alert and oriented to person, not situation and place and with intermittent confusion; [place] and legally blind. Heparin drip started for PE's (+scan). Patient is pale with blanchable and a small abrasion/bruise  to the left leg.        R-(recommendations):  . Will monitor patient per MD orders.       Inpatient nursing criteria listed below were met:    Adult Profile completedYes  Health care directives status obtained and documented: Yes  VTE prophylaxis orders: Herarin drip  (FYI: Asprin is not an approved anticoagulation for DVT prophylaxis)  SCD's Documented: Heparin drip  Vaccine assessment done and vaccine ordered if needed. Yes  MRSA swab completed for patient 55 years and older (exclude TAMIKO and TKA): Yes  My Chart patient sign up addressed and documented: Yes  Care Plan initiated: Yes  Discharge planning review completed (admission navigator) Yes

## 2018-02-23 NOTE — PLAN OF CARE
Problem: PT General Care Plan  Goal: Stairs (PT)  PT Stairs  Outcome: Change based on patient need/priority Date Met: 02/23/18

## 2018-02-23 NOTE — ED NOTES
ED Nursing criteria listed below was addressed during verbal handoff:     Abnormal vitals: Yes, low blood pressure and high pulse when he came in.  Abnormal results: Yes, Kidney function, liver function abnormal.  Med Reconciliation completed: Yes  Meds given in ED: Yes, 1 Liter IV fluid given, maintenance fluids hung.  Any Overdue Meds: No  Core Measures: N/A  Isolation: N/A  Special needs: Yes, high falls risk, slightly confused when he first came in but is clearing up the more fluids he   Skin assessment: Yes, patient has very fragile skin, no wounds seen upon inspection.     Observation Patient  Education provided: No, family refused they have already seen it.

## 2018-02-23 NOTE — PLAN OF CARE
Problem: Venous Thromboembolic Disease, DVT and PE (Infant)  Goal: Signs and Symptoms of Listed Potential Problems Will be Absent, Minimized or Managed (Venous Thromboembolic Disease, DVT and PE)  Signs and symptoms of listed potential problems will be absent, minimized or managed by discharge/transition of care (reference Venous Thromboembolic Disease, DVT and PE (Infant) CPG).   Outcome: Improving  VSS, Lungs diminished, 3L 02 to maintain sats, afebrile.  Heparin 1150 units/hr; 10a scheduled for 2300. Patient up for meals with a fair appetite and small meals encouraged. skin pale and buttock is blanchable. UOP 350cc's and 1 BM.  Will continue to monitor per POC      Problem: Diabetes Comorbidity  Goal: Diabetes  Patient comorbidity will be monitored for signs and symptoms of hyperglycemia or hypoglycemia. Problems will be absent, minimized or managed by discharge/transition of care.   Outcome: No Change  ,142,159 controlled with SS and long acting.      Problem: Renal Insufficiency Comorbidity  Goal: Renal Insufficiency  Patient comorbidity will be monitored for signs and symptoms of Renal Insufficiency (Chronic) condition.  Problems will be absent, minimized or managed by discharge/transition of care.   Creatinine 3.87 BUN 49 and I/O monitored this shift with 350 CC's UOP.

## 2018-02-23 NOTE — PHARMACY-ANTICOAGULATION SERVICE
Clinical Pharmacy - Warfarin Dosing Consult     Pharmacy has been consulted to manage this patient s warfarin therapy.  Indication: DVT/ PE Treatment  Therapy Goal: INR 2-3  Warfarin Prior to Admission: No  Recent documented change in oral intake/nutrition: Unknown    INR   Date Value Ref Range Status   02/23/2018 1.12 0.86 - 1.14 Final   09/27/2010 1.06 0.86 - 1.14 Final       Recommend initial warfarin dose of 5 mg today.  Pharmacy will monitor Shaun Gotti daily and order warfarin doses to achieve specified goal.      Please contact pharmacy as soon as possible if the warfarin needs to be held for a procedure or if the warfarin goals change.

## 2018-02-24 NOTE — PHARMACY-ANTICOAGULATION SERVICE
Clinical Pharmacy - Warfarin Dosing Consult     Pharmacy has been consulted to manage this patient s warfarin therapy.  Indication: DVT/ PE Treatment  Therapy Goal: INR 2-3  Warfarin Prior to Admission: No  Recent documented change in oral intake/nutrition: Unknown    INR   Date Value Ref Range Status   02/24/2018 1.23 (H) 0.86 - 1.14 Final   02/23/2018 1.12 0.86 - 1.14 Final     Patient is being initiated on warfarin.  Received first dose of warfarin yesterday.  Will repeat same dose.  Recommend warfarin 5 mg today.  Pharmacy will monitor Shaun Gotti daily and order warfarin doses to achieve specified goal.      Please contact pharmacy as soon as possible if the warfarin needs to be held for a procedure or if the warfarin goals change.      Ten Harmon,PharmD.........February 24, 2018 10:47 AM

## 2018-02-24 NOTE — PLAN OF CARE
Problem: Patient Care Overview  Goal: Plan of Care/Patient Progress Review  Discharge Planner PT   Patient plan for discharge: back to apartment with family support  Current status: confusion, very [place], independent bed mobility, CGA transfers and ambulation short distances with walker, SpO2 in low 90s on RA with activity  Barriers to return to prior living situation: none if family can provide 24/7 assistance  Recommendations for discharge: home PT to address safety with mobility/weakness  Rationale for recommendations: would need 24/7 assistance due to confusion and overall safety with mobility       Entered by: Belen Castrejon 02/24/2018 10:15 AM

## 2018-02-24 NOTE — PLAN OF CARE
Problem: Venous Thromboembolic Disease, DVT and PE (Infant)  Goal: Signs and Symptoms of Listed Potential Problems Will be Absent, Minimized or Managed (Venous Thromboembolic Disease, DVT and PE)  Signs and symptoms of listed potential problems will be absent, minimized or managed by discharge/transition of care (reference Venous Thromboembolic Disease, DVT and PE (Infant) CPG).   Outcome: Improving  Note that VSS, Sats are 90+ on room air.  Lungs are CTA.  Up for meals, eating poorly.  Up to BR vdg and stool.  Pt remains on heparin.

## 2018-02-24 NOTE — PLAN OF CARE
Problem: Patient Care Overview  Goal: Plan of Care/Patient Progress Review  Outcome: Therapy, progress toward functional goals as expected  S-(situation): shift note    B-(background): PE    A-(assessment): Pt is very [place].  Using hearing device but still can't hear well.  VSS.  Afebrile.  Gets SOA when getting up on the commode.  Sats are 90-93% on 2 liters.  Has been awake a lot of the shift.  Tries to remove his IV, coban put on to protect it.  Is disoriented to place, time and situation.  Is not aware he is in the hospital.  Denies pain.  Edema has improved.     R-(recommendations): Will cont to monitor the above.

## 2018-02-25 NOTE — PLAN OF CARE
Problem: Venous Thromboembolic Disease, DVT and PE (Infant)  Goal: Signs and Symptoms of Listed Potential Problems Will be Absent, Minimized or Managed (Venous Thromboembolic Disease, DVT and PE)  Signs and symptoms of listed potential problems will be absent, minimized or managed by discharge/transition of care (reference Venous Thromboembolic Disease, DVT and PE (Infant) CPG).   Outcome: Improving  Note that INR is improving.  Lungs CTA, pt up for meals and eating poorly.  To the BR to void.  Up with SBA, gait belt and walker.  Pt c/o being tired.

## 2018-02-25 NOTE — PROGRESS NOTES
[facility]    Hospitalist Progress Note    Date of Service (when I saw the patient): 02/25/2018    Assessment & Plan   Shauneloy Gotti is a 89 year old male who was admitted on 2/22/2018 with acute pulmonary emboli complicated by hypoxia, syncope and severely elevated lactic acid level consistent with severe tissue hypoxia and hypoperfusion.  He also has acute kidney injury possibly due to ATN with severely impaired renal function such that he has required IV heparin anticoagulation and is not a candidate for Lovenox.  INR today is now therapeutic for the first time after starting warfarin.  He is clinically improved with resolved hypoxia.  Renal function is trending toward improvement but has not yet returned to his previous baseline.  He appears to have mild metabolic acidosis with normal anion gap today possibly due to hyperchloremia from initial aggressive IV fluid resuscitation with isotonic IV fluids although he is also at risk for metabolic acidosis due to renal failure.  Based on family's report of confusion at home and his initial decreased responsiveness, there was concern for acute encephalopathy initially likely due to hypoxia and hypoperfusion.  Mentation appears to be recovering to baseline although his visual and hearing loss limit clinical evaluation.  He continues to be anemic without active bleeding after the addition of therapeutic anticoagulation to his chronic antiplatelet therapy.  Aspirin was discontinued as he started anticoagulation but he remains on Plavix due to his history of atherosclerotic vascular disease.  Recent A1c was 7.5 concerning for uncontrolled type 2 diabetes, but glycemic control here in the hospital has been adequate.  Functional status is much improved compared with admission with plan for him to return home with his family once he is clinically stable.  Nursing reports that he seems dizzy when he first gets up and he did have a syncopal  event at home on the day of presentation.    Principal Problem:    Acute pulmonary embolism (H)  Active Problems:    Diabetes type 2, uncontrolled (H)    Acute kidney injury (H)    Hypoxia    Pulmonary hypertension    Anemia in chronic kidney disease    Acute encephalopathy - possible    Essential hypertension, benign    CKD (chronic kidney disease) stage 4, 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)    Cardiac pacemaker in situ    Weakness    Elevated lactic acid level    Coronary artery disease involving native coronary artery - angio in 2006 with multivessel disease managed medically    Syncope    MRSA (methicillin resistant staph aureus) culture positive    Continue IV heparin for another 24 hours, adjust dosing according to protocol depending upon factor Xa levels, anticipate discontinuing IV heparin tomorrow if INR remains therapeutic   continue warfarin, anticipate completing at least a 3 month course of warfarin therapy  Recommend continuing Plavix at discharge but would not resume aspirin therapy at this time while he is taking warfarin   For evaluation of his dizziness when up, check orthostatic vital signs  Discussed with his son today    Pain Plan: # Pain Assessment:   Current Pain Score 2/24/2018 2/23/2018 2/23/2018   Patient currently in pain? denies denies denies   Pain score (0-10) - - -   Shaun s pain level was assessed and he currently denies pain.      DVT Prophylaxis: Receiving therapeutic anticoagulation  Code Status: DNR/DNI    Disposition: Expected discharge home tomorrow with family.    Kurt Carrillo    Interval History   Patient offers no complaints today.  Nursing reports that he seems to be off balance when he gets up on his feet and apparently reports some dizziness.  This seems to subside fairly quickly, and he has been able to ambulate using a walker.  He remains afebrile and hemodynamically stable.  Oxygenation has been normal in room air at rest.  He is tolerating adequate oral intake and urine output  has been good.    -Data reviewed today: I reviewed all new labs and imaging results over the last 24 hours. I personally reviewed no images or EKG's today.    Physical Exam   Temp: 97.6  F (36.4  C) Temp src: Axillary BP: 129/65 Pulse: 70   Resp: 18 SpO2: 94 % O2 Device: None (Room air)    Vitals:    02/23/18 0009 02/24/18 0320 02/25/18 0434   Weight: 70.2 kg (154 lb 12.2 oz) 71.2 kg (156 lb 15.5 oz) 70.2 kg (154 lb 12.2 oz)     Vital Signs with Ranges  Temp:  [96.5  F (35.8  C)-98.7  F (37.1  C)] 97.6  F (36.4  C)  Pulse:  [70-81] 70  Resp:  [18-20] 18  BP: (123-173)/(56-94) 129/65  SpO2:  [90 %-94 %] 94 %  I/O last 3 completed shifts:  In: 3008 [P.O.:800; I.V.:2208]  Out: 1450 [Urine:1450]    Constitutional: No acute distress resting in bed  Respiratory: Normal respiratory effort, clear lungs  Cardiovascular: Regular rate and rhythm    Medications     - MEDICATION INSTRUCTIONS -       NaCl 10 mL/hr at 02/24/18 1139     HEParin 1,150 Units/hr (02/25/18 0048)     Warfarin Therapy Reminder         warfarin  2.5 mg Oral ONCE at 18:00     clopidogrel  75 mg Oral Daily     hypromellose-dextran  1 drop Both Eyes 4x Daily     insulin glargine  7 Units Subcutaneous QAM     omeprazole  20 mg Oral QAM AC     tamsulosin  0.4 mg Oral At Bedtime     metoprolol succinate  50 mg Oral Daily     insulin aspart  1-7 Units Subcutaneous TID AC     insulin aspart  1-5 Units Subcutaneous At Bedtime     finasteride  5 mg Oral Daily       Data   Data reviewed today:  I personally reviewed no images or EKG's today.    Recent Labs  Lab 02/25/18  0557 02/24/18  0551 02/23/18  1450 02/23/18  0727 02/23/18  0030 02/22/18  2120 02/22/18  1620   WBC  --   --   --   --   --   --  5.3   HGB  --   --   --  8.3*  --  8.4* 9.0*   MCV  --   --   --   --   --   --  97   PLT  --   --   --   --   --   --  198   INR 2.04* 1.23* 1.12  --   --   --   --      --   --  140 139  --  138   POTASSIUM 4.1  --   --  4.4 4.9  --  4.8   CHLORIDE 109  --   --   109 108  --  104   CO2 19*  --   --  20 21  --  18*   BUN 42*  --   --  48* 49*  --  49*   CR 3.40*  --   --  3.87* 4.01*  --  4.22*   ANIONGAP 11  --   --  11 10  --  16*   RAHEL 8.4*  --   --  8.2* 8.3*  --  8.9   *  --   --  133* 165*  --  221*   ALBUMIN  --   --   --   --   --   --  2.5*   PROTTOTAL  --   --   --   --   --   --  7.5   BILITOTAL  --   --   --   --   --   --  0.5   ALKPHOS  --   --   --   --   --   --  154*   ALT  --   --   --   --   --   --  29   AST  --   --   --   --   --   --  49*   LIPASE  --   --   --   --   --   --  54*   TROPI  --   --   --   --   --   --  <0.015     Factor Xa 0.41 which is therapeutic  Blood cultures remain negative to date  Blood sugars have ranged  over the last 24 hours

## 2018-02-25 NOTE — PLAN OF CARE
Problem: Patient Care Overview  Goal: Plan of Care/Patient Progress Review  Discharge Planner PT   Patient plan for discharge: back to apartment with family  Current status: less confused today, very [place] - doesn't like pocket talker, reports feeling shaky and dizzy today, no LOB, CGA mobility with FWW but did not walk very far due to sx  Barriers to return to prior living situation: none unless family is not able to provide 24/7 support  Recommendations for discharge: back to apartment with family, home PT eval to address safety with mobility in home  Rationale for recommendations: falls risk, hx of confusion       Entered by: Belen Castrejon 02/25/2018 9:55 AM

## 2018-02-25 NOTE — PLAN OF CARE
Problem: Patient Care Overview  Goal: Plan of Care/Patient Progress Review  Outcome: Therapy, progress towards functional goals is fair  Vital signs stable on RA. Afebrile.  A&O to self, place and month, disoriented to situation.  [place] and legally blind.  Has not used call light, triggers bed alarm and up with 2 assist to bedside commode with walker and GB.  Pt denying pain, denies any numbness nor tingling.  Lung sounds clear diminished in bilateral bases.  Bowel sounds normoactive.  Voiding adequately.  Skin is intact with bruising.  IVFs infusing with Heparin drip, site asymptomatic.  Second IV saline locked and flushed, site bloody but has blood return.  BG- 110, 81.  Weight down 1kg.

## 2018-02-25 NOTE — PHARMACY-ANTICOAGULATION SERVICE
Clinical Pharmacy - Warfarin Dosing Consult     Pharmacy has been consulted to manage this patient s warfarin therapy.  Indication: DVT/ PE Treatment  Therapy Goal: INR 2-3  Warfarin Prior to Admission: No  Recent documented change in oral intake/nutrition: Unknown    INR   Date Value Ref Range Status   02/25/2018 2.04 (H) 0.86 - 1.14 Final   02/24/2018 1.23 (H) 0.86 - 1.14 Final     Significant increase in INR after just 2 doses of warfarin.  Will decrease dose today.    Recommend warfarin 2.5 mg today.  Pharmacy will monitor Shaun Gotti daily and order warfarin doses to achieve specified goal.      Please contact pharmacy as soon as possible if the warfarin needs to be held for a procedure or if the warfarin goals change.      Ten [place],PharmD.........February 25, 2018 8:55 AM

## 2018-02-26 NOTE — PHARMACY-ANTICOAGULATION SERVICE
Clinical Pharmacy - Warfarin Dosing Consult     Pharmacy has been consulted to manage this patient s warfarin therapy.  Indication: DVT/ PE Treatment  Therapy Goal: INR 2-3  Warfarin Prior to Admission: No  Recent documented change in oral intake/nutrition: Unknown    INR   Date Value Ref Range Status   02/26/2018 4.11 (H) 0.86 - 1.14 Final   02/25/2018 2.04 (H) 0.86 - 1.14 Final       Recommend warfarin ZERO mg today.  Pharmacy will monitor Shaun Gotti daily and order warfarin doses to achieve specified goal.      Please contact pharmacy as soon as possible if the warfarin needs to be held for a procedure or if the warfarin goals change.

## 2018-02-26 NOTE — PLAN OF CARE
Problem: Patient Care Overview  Goal: Plan of Care/Patient Progress Review  Outcome: Improving  Heparin infusing @ 1150 units/ hr,  INR 2.04  Up with assist of 1 to bathroom/ walker.  Up in recliner for supper, pt has no appetite, refused to eat. Snack offered this evening, also declined.. VSS, LS diminished/ clear. Denies pain. Orthostatic BP completed this afternoon.

## 2018-02-26 NOTE — DISCHARGE SUMMARY
[facility]    Discharge Summary  Hospitalist    Date of Admission:  2/22/2018  Date of Discharge:  2/26/2018  Discharging Provider: Kurt Carrillo  Date of Service (when I saw the patient): 02/26/18    Discharge Diagnoses     Acute pulmonary embolism (H)    Thrombocytopenia (H)    Diabetes type 2, uncontrolled (H)    Acute kidney injury (H)    Hypoxia    Pulmonary hypertension    Anemia in chronic kidney disease    Acute encephalopathy - possible    Essential hypertension, benign    CKD (chronic kidney disease) stage 4, 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)    Cardiac pacemaker in situ    Weakness    Elevated lactic acid level    Coronary artery disease involving native coronary artery - angio in 2006 with multivessel disease managed medically    Syncope    MRSA (methicillin resistant staph aureus) culture positive    * No resolved hospital problems. *      History of Present Illness   Shaun Gotti is an 89 year old male with multiple chronic medical problems who presented with 1 day history of worsening weakness and syncopal event at home.  Due to concern for pulmonary embolism, hospital admission was advised. See admission history and physical for details.    Hospital Course   Shaun Gotti was admitted on 2/22/2018.  The following problems were addressed during his hospitalization:    Problem #1 acute pulmonary embolism with hypoxia, severely elevated lactate, and syncope.  No acute abnormalities were noted on chest x-ray.  D-dimer was elevated.  VQ scan results were high probability for pulmonary embolism.  He was severely hypoxic initially and required oxygen but did not require assisted ventilation.  Clinical suspicion for acute pulmonary embolism was high.  He was treated with IV heparin and warfarin.  Warfarin dosing was 5 mg on 2/23, 5 mg on 2/24, and 2.5 mg on 2/25.  INR increased from normal to 2.0 on 2/25 and 4.1 by discharge.  Because INR had been therapeutic for 2 consecutive days,  IV heparin was discontinued.  He improved clinically with resolution of hypoxia.  His hemodynamic status stabilized with initial IV fluid therapy, and he did not have orthostatic hypotension after IV fluid administration.  He tolerated advancing diet and activity.  He was evaluated by physical therapy, and discharge to home with family to resume home care was recommended.  After starting anticoagulation, chronic aspirin therapy was discontinued although ongoing Plavix therapy was recommended.  No bleeding was apparent during his hospital stay.  At least a 3 month course of warfarin anticoagulation for treatment of spontaneous pulmonary embolism was recommended.  Pulmonary embolism was attributed to recent decreased activity and sedentary status.  Clinical presentation with severe hypoxia, severely elevated lactate, and syncope was concerning for severe tissue hypoperfusion due to pulmonary embolism and possible transient cardiogenic shock.  Because INR had risen so quickly, he was advised not to take warfarin on 2/26 or 2/27 and then to have his INR retested on 2/28 at which time warfarin should be restarted at dosing as advised by his PCP or INR clinic.  Because dosing of warfarin was uncertain at the time of discharge, a prescription for warfarin was not sent at discharge, so warfarin will need to be prescribed by his PCP and/or INR clinic on 2/28.      Problem #2 acute kidney injury with severe stage IV chronic kidney disease.  Creatinine was increased compared with his baseline.  He was mildly uremic and acidotic but not hyperkalemic.  Urine output was adequate.  With IV fluid therapy, creatinine trended toward improvement although had not returned to baseline by discharge.  Because of impaired renal function, he was not a candidate for diagnostic CT angiogram or for Lovenox treatment of pulmonary embolism.    Problem #3 acute encephalopathy.  Clinical presentation with confusion reported by his family was a  change from his baseline.  With treatment of pulmonary embolism and resolution of hypoxia, mentation appeared to return to baseline by discharge.  Clinical evaluation in mentation was challenging because of his chronic visual loss and hearing loss.  He did not have seizure activity or new overt focal neurologic deficits.  After investigation, a transient acute encephalopathy due to severe hypoxia and tissue hypoperfusion from pulmonary embolism was suspected.    Problem #4 uncontrolled type 2 diabetes.  Hemoglobin A1c was 7.5.  His chronic dose of Lantus insulin was continued during his hospital stay with adequate glycemic control.    Problem #5 thrombocytopenia.  Initial platelet count at admission was normal although he had previous history of mild thrombocytopenia.  On the day of discharge, platelet count was 93,000, significantly decreased from admission and decreased from his previous baseline.  This acute decline in platelet count did coincide in time with administration of IV heparin although clinically was unclear whether he had heparin-induced thrombocytopenia.  If needed, cautious use of heparin in the future with close monitoring of platelet count would be warranted.    Pain plan  # Discharge Pain Plan:   - Patient currently has NO PAIN and is not being prescribed pain medications on discharge.      Kurt Carrillo    Significant Results and Procedures   No procedures performed during this admission    Pending Results   These results will be followed up by PCP  Unresulted Labs Ordered in the Past 30 Days of this Admission     Date and Time Order Name Status Description    2/22/2018 1624 Blood culture Preliminary     2/22/2018 1624 Blood culture Preliminary           Code Status   DNR / DNI       Primary Care Physician   Sony Berkowitz    Physical Exam   154 lbs 5.15 oz  /65  Pulse 77  Temp 98  F (36.7  C) (Axillary)  Resp 18  Wt 70 kg (154 lb 5.2 oz)  SpO2 94%  BMI 22.14 kg/m2    no acute  distress sitting up in bed  Normal respiratory effort, Clear lungs  Regular rate and rhythm, brisk capillary refill, good radial pulse    Discharge Disposition   Admited to home care:   Agency: [facility]  Discharged to home  Condition at discharge: Stable    Consultations This Hospital Stay   SOCIAL WORK IP CONSULT  PHYSICAL THERAPY ADULT IP CONSULT  PHARMACY TO DOSE WARFARIN    Time Spent on this Encounter   IKurt, personally saw the patient today and spent greater than 30 minutes discharging this patient.    Discharge Orders     MD CERTIFICATION HHA PATIENT     INR Clinic Referral     HOME CARE NURSING REFERRAL     Reason for your hospital stay   Hospitalized for blood clots in lungs (pulmonary emboli) and improved. Advised to continue warfarin blood thinner for at least 3 months.     Activity   Your activity upon discharge: activity as tolerated     Monitor and record   blood glucose according to your usual home routine     Follow-up and recommended labs and tests    Follow up with primary care provider, Sony Berkowitz, within 3 days for hospital follow- up.  The following labs/tests are recommended: INR on 2/28/18.     DNR/DNI     Diet   Follow this diet upon discharge: Orders Placed This Encounter     Moderate Consistent CHO Diet       Discharge Medications   Current Discharge Medication List      CONTINUE these medications which have CHANGED    Details   insulin glargine (LANTUS) 100 UNIT/ML injection Inject 7 Units Subcutaneous every morning    Associated Diagnoses: Type 2 diabetes mellitus with stage 4 chronic kidney disease, with long-term current use of insulin (H)      Multiple Vitamins-Minerals (PRESERVISION AREDS 2) CAPS Take 1 capsule by mouth 2 times daily      hypromellose (GENTEAL) 0.3 % SOLN ophthalmic solution Place 1 drop into both eyes 4 times daily         CONTINUE these medications which have NOT CHANGED    Details   warfarin (COUMADIN) 2.5 MG tablet Do not take warfarin on  2/26 or 2/27, recheck INR on 2/28 and then take warfarin at dose advised by INR clinic  Qty: 30 tablet, Refills: 0    Associated Diagnoses: Other acute pulmonary embolism without acute cor pulmonale (H)      acetaminophen (TYLENOL) 325 MG tablet Take 2 tablets (650 mg) by mouth every 4 hours as needed for mild pain  Qty: 100 tablet    Associated Diagnoses: Pain      sertraline (ZOLOFT) 25 MG tablet Take 1 tablet (25 mg) by mouth daily  Qty: 30 tablet    Associated Diagnoses: Major depression in complete remission (H)      atorvastatin (LIPITOR) 20 MG tablet Take 1 tablet (20 mg) by mouth daily  Qty: 30 tablet    Associated Diagnoses: Atherosclerosis of coronary artery of native heart, angina presence unspecified, unspecified vessel or lesion type      metoprolol succinate (TOPROL-XL) 50 MG 24 hr tablet Take 1 tablet (50 mg) by mouth daily  Qty: 30 tablet    Associated Diagnoses: Atherosclerosis of coronary artery of native heart, angina presence unspecified, unspecified vessel or lesion type      amLODIPine (NORVASC) 10 MG tablet Take 1 tablet (10 mg) by mouth daily  Qty: 30 tablet    Associated Diagnoses: Essential hypertension, benign      clopidogrel (PLAVIX) 75 MG tablet 1 TABLET DAILY  Qty: 30 tablet    Associated Diagnoses: Atherosclerosis of coronary artery of native heart, angina presence unspecified, unspecified vessel or lesion type      omeprazole (PRILOSEC) 20 MG CR capsule 1 CAPSULE DAILY  Qty: 30 capsule    Associated Diagnoses: Gastroesophageal reflux disease without esophagitis      finasteride (PROSCAR) 5 MG tablet Take 1 tablet (5 mg) by mouth daily  Qty: 30 tablet, Refills: 0    Associated Diagnoses: Benign prostatic hyperplasia, unspecified whether lower urinary tract symptoms present      tamsulosin (FLOMAX) 0.4 MG capsule Take 1 capsule (0.4 mg) by mouth At Bedtime  Qty: 60 capsule    Associated Diagnoses: Benign prostatic hyperplasia, unspecified whether lower urinary tract symptoms present       nitroGLYcerin (NITROSTAT) 0.4 MG sublingual tablet 1 TAB EVERY 5 MIN AS NEEDED FOR CHEST PAIN, UP TO 3 PER EPISODE  Qty: 25 tablet    Associated Diagnoses: Atherosclerosis of coronary artery of native heart, angina presence unspecified, unspecified vessel or lesion type      !! order for DME Equipment being ordered: Oxygen - patient needs 1L NC oxygen continuously during transportation to rehab facility.  Qty: 1 Device, Refills: 0    Associated Diagnoses: Hypoxia; Pulmonary hypertension      !! ORDER FOR DME Equipment being ordered: walker  Qty: 1 Device, Refills: 0    Associated Diagnoses: Sepsis(995.91); Falls frequently      !! ORDER FOR DME Equipment being ordered: transfer bridge  Qty: 1 Device, Refills: 0    Associated Diagnoses: Sepsis(995.91); Falls frequently      !! ORDER FOR DME Equipment being ordered: 4 wheel walker with brakes and seat  Qty: 1 Device, Refills: 0    Associated Diagnoses: Low back pain; Fall, initial encounter       !! - Potential duplicate medications found. Please discuss with provider.      STOP taking these medications       aspirin 81 MG tablet Comments:   Reason for Stopping:             Allergies   Allergies   Allergen Reactions     Fluoxetine      Lisinopril      Data   Most Recent 3 CBC's:  Recent Labs   Lab Test  02/26/18   0610  02/25/18   0557  02/23/18   0727   02/22/18   1620  01/12/18   0605   WBC  5.1   --    --    --   5.3  10.0   HGB  8.4*  8.7*  8.3*   < >  9.0*  11.0*   MCV  95   --    --    --   97  95   PLT  93*   --    --    --   198  149*    < > = values in this interval not displayed.      Most Recent 3 BMP's:  Recent Labs   Lab Test  02/26/18   0610  02/25/18   0557  02/23/18   0727   NA  139  139  140   POTASSIUM  4.1  4.1  4.4   CHLORIDE  108  109  109   CO2  19*  19*  20   BUN  42*  42*  48*   CR  3.48*  3.40*  3.87*   ANIONGAP  12  11  11   RAHEL  8.3*  8.4*  8.2*   GLC  122*  100*  133*     Most Recent 2 LFT's:  Recent Labs   Lab Test  02/22/18   1620   01/10/18   1225   AST  49*  21   ALT  29  22   ALKPHOS  154*  95   BILITOTAL  0.5  0.8     Most Recent INR's and Anticoagulation Dosing History:  Anticoagulation Dose History     Recent Dosing and Labs Latest Ref Rng & Units 10/22/2006 9/27/2010 9/27/2010 2/23/2018 2/24/2018 2/25/2018 2/26/2018    Warfarin 2.5 mg - - - - - - 2.5 mg -    Warfarin 5 mg - - - - 5 mg 5 mg - -    INR 0.86 - 1.14 1.04 1.10 1.06 1.12 1.23(H) 2.04(H) 4.11(H)        Most Recent 3 Troponin's:  Recent Labs   Lab Test  02/22/18   1620  01/12/18   0605  01/11/18   0630   TROPI  <0.015  0.098*  0.157*     Most Recent 6 Bacteria Isolates From Any Culture (See EPIC Reports for Culture Details):  Recent Labs   Lab Test  02/22/18   2120  02/22/18   1620  01/11/18   1345  08/16/16   2042  08/16/16   2025  03/29/13   0718   CULT  No growth after 4 days  No growth after 4 days  Methicillin resistant Staphylococcus aureus (MRSA) isolated*  No growth after 6 days  No growth after 6 days  No MRSA isolated     Most Recent TSH, T4 and A1c Labs:  Recent Labs   Lab Test  02/23/18   0727  02/22/18   1620   TSH   --   6.14*   T4   --   1.03   A1C  7.5*   --      Results for orders placed or performed during the hospital encounter of 02/22/18   XR Chest 2 Views    Narrative    XR CHEST 2 VW 2/22/2018 5:29 PM    HISTORY: Cough.    COMPARISON: January 10, 2018.      Impression    IMPRESSION: Left-sided cardiac device in place with leads unchanged in  position. Mild venous congestion, similar to prior exam. Otherwise, no  new focal pulmonary opacities. No pleural effusions or pneumothorax.  Stable heart size.    BURT REYNOLDS MD   NM Lung vent and perf*     Value    Radiologist flags High probability for pulmonary embolism (AA)    Narrative    NUCLEAR MEDICINE LUNG SCAN VENTILATION AND PERFUSION  2/23/2018 6:25  AM    HISTORY: Hypoxia. Elevated D-dimer.    COMPARISON: Chest radiograph performed 2/22/2018.    TECHNIQUE: Patient was administered 4.9 mCi Tc 99m MAA  intravenously  prior to performing the perfusion images. Patient was then given  aerosolized 70 mCi Tc 99m DTPA for the ventilation images.     FINDINGS: There is a large mismatched segmental perfusion defect in  the right lower lobe of the lung. There is an additional large  mismatched segmental perfusion defect also noted in the lingula. There  are also likely several smaller subsegmental perfusion defects in the  left lung which also appear mismatched. Findings correspond to a high  probability for pulmonary embolism.      Impression    IMPRESSION: High probability (greater than 80%) for pulmonary  embolism.      [Critical Result: High probability for pulmonary embolism]    Finding was identified on 2/23/2018 7:18 AM.     Dr. Carrillo was contacted by me on 2/23/2018 7:27 AM and verbalized  understanding of the critical result.     CT ALICEA MD   US Renal Complete    Narrative    RENAL ULTRASOUND   2/23/2018 9:44 AM     HISTORY: acute on chronic renal failure;     COMPARISON: None.    FINDINGS:  The kidneys are normal in size. The right kidney is of  normal cortical thickness. There is mild cortical thinning of left  kidney with a cortex measuring up to 1 cm in thickness on the left and  1.3 seen in thickness on the right..  No renal masses are seen.  There  is no hydronephrosis or renal calculus. Small amount of left  perinephric fluid is noted. Small simple appearing cyst in the  inferior pole right kidney measures up to 0.5 cm.    The visualized portions of the urinary bladder appear normal.    Prevoid urinary bladder volume is 149 mL. Patient was unable to void.   Bilateral ureteral jets are present.        Impression    IMPRESSION:    1. Small right renal cyst.  2. Trace left perinephric fluid of uncertain etiology. Is also mild  left renal cortical thinning.  3. Patient was unable to void leading up postvoid residual  approximately 150 mL.    FELICITY DOOLEY MD

## 2018-02-26 NOTE — PROGRESS NOTES
S-(situation): Patient discharged to home via family car  with family at 1110    B-(background): generalized weakness    A-(assessment):  Pateint tolerated breakfast after getting something he desired versus what was sent up on his breakfast tray automatically.  Up to the bathroom with assist of 1 and walker and gait belt. Patient is hard of hearing but sometimes this writer thinks he can hear more than he leads on to be able to hear. Dressed with assist of two and walked to the wheelchair.    R-(recommendations): Discharge instructions reviewed with daughter/son. Listed belongings gathered and returned to patient, including both hearing aides and watch.         Discharge Nursing Criteria:     Care Plan and Patient education resolved: Yes    New Medications- pt has been educated about purpose and side effects: Yes    Vaccines  Influenza status verified at discharge:  declines          MISC  Prescriptions if needed, hard copies sent with patient  NA  Home and hospital aquired medications returned to patient: NA  Medication Bin checked and emptied on discharge Yes  Patient reports post-discharge pain management plan is effective: Yes

## 2018-02-26 NOTE — PLAN OF CARE
Problem: Patient Care Overview  Goal: Plan of Care/Patient Progress Review  Outcome: Therapy, progress towards functional goals is fair  Vital signs stable on RA, O2 sats-94%. Afebrile.  A&O to self and place, disoriented to situation.  [place] and legally blind.  Has used call light, up with 1-2 assist to BR with walker and GB.  Pt denying pain, denies any numbness nor tingling.  Lung sounds clear diminished in bilateral bases.  Bowel sounds hypoactive. Slept most of shift.  Voiding adequately.  Skin is intact.  IVFs infusing with Heparin drip 11.5ml/hr, site asymptomatic.  BG- 118. Weight-

## 2018-02-26 NOTE — PROGRESS NOTES
hSaun Gotti  Gender: male  : 1925  [address]   [facility] [id number] [phone] (home)     Medical Record: [id number]  Pharmacy:    [id number] - [place], [place] - [address] [facility] - [place] - [address]  [facility]  Primary Care Provider: Sony Berkowitz    Parent's names are: Data Unavailable (mother) and Data Unavailable (father).      [facility]  2018     Discharge Phone Call:  Key Words/Key Times    Patient from Senior living. Did not place on call back list because of severely [place].  Vineet Sanford RN

## 2018-02-26 NOTE — PROGRESS NOTES
Care Coordinator- Discharge Planning     Admission Date/Time:  2/22/2018  Attending MD:  Kurt Carrillo MD     Data  Date of initial CC assessment:  2/23/18  Chart reviewed, discussed with interdisciplinary team.   Patient was admitted for:   1. Other acute pulmonary embolism without acute cor pulmonale (H)    2. Hypoxia    3. Anemia, unspecified type    4. Generalized muscle weakness    5. Type 2 diabetes mellitus with stage 4 chronic kidney disease, with long-term current use of insulin (H)    6. Acute kidney injury (H)         Assessment  Full assessment completed in previous note    Coordination of Care and Referrals: Provided patient/family with options for Home Care.      Plan  Anticipated Discharge Date:  2/26/18  Anticipated Discharge Plan:  Home with family support and Rachelle FIGUEROA RN PT HHA with clinic follow up    CTS Handoff completed:  YES    Maria Alejandra Leong RN

## 2018-02-27 NOTE — PROGRESS NOTES
Clinic Care Coordination Contact  Care Coordination Communication    Referral Source: CTS    Clinical Data: Patient was hospitalized at [facility]    Reason for Hospitalization:  Hypoxia Generalized muscle weakness, Anemia,  Admit Date/Time: 2/22/2018  3:59 PM  Discharge Date: 2/26/18    Home Care Contact:              Home Care Agency: [facility]                Contact name () and phone number: [phone]              Care Coordination contacted home care: Yes              Anticipated start of care date: 2/28/18    Patient Contact: spoke with shad Flanagan              Introduced self and role of care coordination.               Discharge instructions were reviewed with patient/caregiver.               Do you have any questions about your medications? no              Follow up appointment is scheduled for 2/27.              Provided 24 Hour Nurse Line and/or 24 Hour Appointment Scheduling: Yes              Home care has contacted patient: Yes     Patient questions/concerns: Called and talked to shad Flanagan, Stated his dad is still pretty tired and is currently resting.  States he and his wife have been the sole care givers for pt for the last five years.  They are currently staying at pt's      apartment and taking care of him so he is not ever left alone.  Panchito [facility], did want to follow up with Dr. Berkowitz.  Panchito states the home care nurse will be coming out tomorrow morning to         re instate home care.  Panchito states ever since his hospitalization a few months ago all pt has been doing is sleeping and getting up to going to the bathroom.  Panchito also states they will stay at his home until there comes a point where he needs to be somewhere else but for now want to keep him home.  Offered any support of suggestions from family to help reduce hospital admissions. Panchito states if they can think of anything they will let  us know.     Plan: RN/SW Care Coordinator will await notification from home care staff informing RN/SW Care Coordinator of patients discharge plans/needs. RN/SW Care Coordinator will review chart and outreach to home care every 4 weeks and as needed.      Belen MOONEY, RN, PHN  Care Coordination    [facility]  [address] [place] [address]  Office: [phone]  Fax [phone]   [facility]  [address] [place] [address]  Office: [phone] Fax [phone]  [email or web address]   [email or web address]   Connect with [facility] on social media.

## 2018-02-28 PROBLEM — Z79.01 LONG TERM CURRENT USE OF ANTICOAGULANT THERAPY: Status: ACTIVE | Noted: 2018-01-01

## 2018-02-28 PROBLEM — Z79.01 LONG-TERM (CURRENT) USE OF ANTICOAGULANTS: Status: ACTIVE | Noted: 2018-01-01

## 2018-02-28 NOTE — PROGRESS NOTES
ANTICOAGULATION FOLLOW-UP CLINIC VISIT    Patient Name:  Shaun Gotti  Date:  2/28/2018  Contact Type:  Pt was see by PCP.     SUBJECTIVE:     Patient Findings     Comments New to coumadin clinic. Saw PCP today. Pt had 5 mg F, Sat and 2.5 mg Sun, 0 mg Mon, Tues= 12.5 mg. Dr. Berkowitz has advised him to hold again today Wed and take 1 mg Thurs. Recheck will be Friday with homecare.           OBJECTIVE    INR   Date Value Ref Range Status   02/28/2018 6.20 (HH) 0.86 - 1.14 Final     Comment:     Critical Value called to and read back by  AREN SRINIVASAN AT CLINIC AT 1430         ASSESSMENT / PLAN  INR assessment SUPRA    Recheck INR In: 2 DAYS    INR Location Outside lab      Anticoagulation Summary as of 2/28/2018     INR goal 2.0-3.0   Today's INR 6.20!   Maintenance plan No maintenance plan   Full instructions 2/28: Hold; 3/1: 1 mg; Otherwise No maintenance plan   Next INR check 3/2/2018   Target end date     Indications   Long-term (current) use of anticoagulants [Z79.01] [Z79.01]  Acute pulmonary embolism (H) [I26.99]         Anticoagulation Episode Summary     INR check location     Preferred lab     Send INR reminders to [facility] POOL    Comments 1 mg tabs, pt is deaf      Anticoagulation Care Providers     Provider Role Specialty Phone number    Sony Berkowitz MD [facility] Internal Medicine [phone]            See the Encounter Report to view Anticoagulation Flowsheet and Dosing Calendar (Go to Encounters tab in chart review, and find the Anticoagulation Therapy Visit)    Dosage adjustment made based on physician directed care plan.    Deena Leal RN

## 2018-02-28 NOTE — NURSING NOTE
"Chief Complaint   Patient presents with     Hospital F/U       Initial BP 98/56  Pulse 82  Resp 20  SpO2 95% Estimated body mass index is 22.14 kg/(m^2) as calculated from the following:    Height as of 1/10/18: 5' 10\" (1.778 m).    Weight as of 2/26/18: 154 lb 5.2 oz (70 kg).  Medication Reconciliation: complete  "

## 2018-02-28 NOTE — MR AVS SNAPSHOT
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  Normal or non-critical lab and imaging results will be communicated to you by MyChart, letter or phone within 4 business days after the clinic has received the results. If you do not hear from us within 7 days, please contact the clinic through MyChart or phone. If you have a critical or abnormal lab result, we will notify you by phone as soon as possible.  Submit refill requests through [employer] or call your pharmacy and they will forward the refill request to us. Please allow 3 business days for your refill to be completed.          Additional Information About Your Visit        Care EveryWhere ID     This is your Care EveryWhere ID. This could be used by other organizations to access your [place] medical records  [id number]        Your Vitals Were     Pulse Respirations Pulse Oximetry             82 20 95%          Blood Pressure from Last 3 Encounters:   02/28/18 98/56   02/26/18 139/65   01/12/18 146/70    Weight from Last 3 Encounters:   02/26/18 154 lb 5.2 oz (70 kg)   01/10/18 156 lb 15.5 oz (71.2 kg)   09/21/17 155 lb 12.8 oz (70.7 kg)              We Performed the Following     Basic metabolic panel     CBC with platelets     INR        Primary Care  Provider Office Phone # Fax #    Sony Berkowitz [phone]       [facility] [address] [place] [address]        Equal Access to Services     JOHN RAGSDALE : Hadii celine cano jarrello Somorganali, waaxda luqadaha, qaybta kaalmada adeegyada, callie stonemayi cerda. So [facility] [phone].    ATENCIÓN: Si habla español, tiene a castano disposición servicios gratuitos de asistencia lingüística. Llame al [phone].    We comply with applicable federal civil rights laws and [place] laws. We do not discriminate on the basis of race, color, national origin, age, disability, sex, sexual orientation, or gender identity.            Thank you!     Thank you for choosing [facility]  for your care. Our goal is always to provide you with excellent care. Hearing back from our patients is one way we can continue to improve our services. Please take a few minutes to complete the written survey that you may receive in the mail after your visit with us. Thank you!             Your Updated Medication List - Protect others around you: Learn how to safely use, store and throw away your medicines at www.disposemymeds.org.          This list is accurate as of 2/28/18  2:21 PM.  Always use your most recent med list.                   Brand Name Dispense Instructions for use Diagnosis    acetaminophen 325 MG tablet    TYLENOL    100 tablet    Take 2 tablets (650 mg) by mouth every 4 hours as needed for mild pain    Pain       amLODIPine 10 MG tablet    NORVASC    30 tablet    Take 1 tablet (10 mg) by mouth daily    Essential hypertension, benign       atorvastatin 20 MG tablet    LIPITOR    30 tablet    Take 1 tablet (20 mg) by mouth daily    Atherosclerosis of coronary artery of native heart, angina presence unspecified, unspecified vessel or lesion type       clopidogrel 75 MG tablet    PLAVIX    30 tablet    1 TABLET DAILY    Atherosclerosis of coronary artery of native  heart, angina presence unspecified, unspecified vessel or lesion type       finasteride 5 MG tablet    PROSCAR    30 tablet    Take 1 tablet (5 mg) by mouth daily    Benign prostatic hyperplasia, unspecified whether lower urinary tract symptoms present       hypromellose 0.3 % Soln ophthalmic solution    GENTEAL     Place 1 drop into both eyes 4 times daily        insulin glargine 100 UNIT/ML injection    LANTUS     Inject 7 Units Subcutaneous every morning    Type 2 diabetes mellitus with stage 4 chronic kidney disease, with long-term current use of insulin (H)       metoprolol succinate 50 MG 24 hr tablet    TOPROL-XL    30 tablet    Take 1 tablet (50 mg) by mouth daily    Atherosclerosis of coronary artery of native heart, angina presence unspecified, unspecified vessel or lesion type       nitroGLYcerin 0.4 MG sublingual tablet    NITROSTAT    25 tablet    1 TAB EVERY 5 MIN AS NEEDED FOR CHEST PAIN, UP TO 3 PER EPISODE    Atherosclerosis of coronary artery of native heart, angina presence unspecified, unspecified vessel or lesion type       omeprazole 20 MG CR capsule    priLOSEC    30 capsule    1 CAPSULE DAILY    Gastroesophageal reflux disease without esophagitis       * order for DME     1 Device    Equipment being ordered: 4 wheel walker with brakes and seat    Low back pain, Fall, initial encounter       * order for DME     1 Device    Equipment being ordered: walker    Sepsis(995.91), Falls frequently       * order for DME     1 Device    Equipment being ordered: transfer bridge    Sepsis(995.91), Falls frequently       order for DME     1 Device    Equipment being ordered: Oxygen - patient needs 1L [place] oxygen continuously during transportation to rehab facility.    Hypoxia, Pulmonary hypertension       PRESERVISION AREDS 2 Caps      Take 1 capsule by mouth 2 times daily        sertraline 25 MG tablet    ZOLOFT    30 tablet    Take 1 tablet (25 mg) by mouth daily    Major depression in complete remission  (H)       tamsulosin 0.4 MG capsule    FLOMAX    60 capsule    Take 1 capsule (0.4 mg) by mouth At Bedtime    Benign prostatic hyperplasia, unspecified whether lower urinary tract symptoms present       warfarin 2.5 MG tablet    COUMADIN    30 tablet    Do not take warfarin on 2/26 or 2/27, recheck INR on 2/28 and then take warfarin at dose advised by INR clinic    Other acute pulmonary embolism without acute cor pulmonale (H)       * Notice:  This list has 3 medication(s) that are the same as other medications prescribed for you. Read the directions carefully, and ask your doctor or other care provider to review them with you.

## 2018-02-28 NOTE — MR AVS SNAPSHOT
Shaun Gotti   2/28/2018   Anticoagulation Therapy Visit    Description:  89 year old male   Provider:  Sony Berkowitz MD   Department:  Ph Anticoag           INR as of 2/28/2018     Today's INR 6.20!      Anticoagulation Summary as of 2/28/2018     INR goal 2.0-3.0   Today's INR 6.20!   Full instructions 2/28: Hold; 3/1: 1 mg; Otherwise No maintenance plan   Next INR check 3/2/2018    Indications   Long-term (current) use of anticoagulants [Z79.01] [Z79.01]  Acute pulmonary embolism (H) [I26.99]         Contact Numbers     Clinic Number:         February 2018 Details    Sun Mon Tue Wed Thu Fri Sat         1               2               3                 4               5               6               7               8               9               10                 11               12               13               14               15               16               17                 18               19               20               21               22               23               24                 25               26               27               28      Hold   See details          Date Details   02/28 This INR check               How to take your warfarin dose     Hold Do not take your warfarin dose. See the Details table to the right for additional instructions.                March 2018 Details    Sun Mon Tue Wed Thu Fri Sat         1      1 mg         2            3                 4               5               6               7               8               9               10                 11               12               13               14               15               16               17                 18               19               20               21               22               23               24                 25               26               27               28               29               30               31                Date Details   No additional details    Date  of next INR:  3/2/2018         How to take your warfarin dose     To take:  1 mg Take 1 of the 1 mg tablets.

## 2018-02-28 NOTE — PROGRESS NOTES
SUBJECTIVE:   Shaun Gotti is a 89 year old male who presents to clinic today for the following health issues:          Hospital Follow-up Visit:    Hospital/Nursing Home/IP Rehab Facility: [facility]  Date of Admission: 2/22/18  Date of Discharge: 2/26/18  Reason(s) for Admission: Acute pulmonary emblism            Problems taking medications regularly:  None       Medication changes since discharge: Asprin to warfin        Problems adhering to non-medication therapy:  None    Summary of hospitalization:  [facility] discharge summary reviewed  Diagnostic Tests/Treatments reviewed.  Follow up needed: none  Other Healthcare Providers Involved in Patient s Care:         Homecare  Update since discharge: fluctuating course.     Post Discharge Medication Reconciliation: discharge medications reconciled and changed, per note/orders (see AVS).  Plan of care communicated with patient and family          He lives at [facility] and then was in the hospital with weakness and some syncope.  Then in the ER found a PE, had been less activity.      Still weak at home, needs two person assistant to move, normally a 1 person assist.  No appetite, eating 3 bite of things.     Has homecare that comes in and has an aide and housekeeping and bath. Should be getting some PT and OT from homecare.      No pains, some sob.  Any exertion then gets sob easily.  Sleeps quite a bit, eats some meals and then is up for an hour.      Blood sugars have been up lately, lantus is at 7 units.  Sugar at 173 this am.      plavix continues, aspirin was held for coumadin.   Coumadin was started at 5 mg and then 2.5 mg on the 24 and 25th, nothing on the 26, 27, or today so far.      Past Medical History:   Diagnosis Date     Bronchitis, not specified as acute or chronic 09/26/10    Admitted. Lower respiratory infection, severe weakness.     Chronic kidney disease (CKD)      Coronary atherosclerosis of native coronary artery  1/22/2010    Admitted. Syncopal spell and substernal chest pain.     Coronary atherosclerosis of unspecified type of vessel, native or graft      Hernia of unspecified site of abdominal cavity without mention of obstruction or gangrene     left inguinal hernia     Other and unspecified hyperlipidemia      Prostatitis, unspecified     Chronic prostatitis     Type II or unspecified type diabetes mellitus without mention of complication, not stated as uncontrolled      Unspecified essential hypertension      Current Outpatient Prescriptions   Medication     warfarin (COUMADIN) 1 MG tablet     insulin glargine (LANTUS) 100 UNIT/ML injection     Multiple Vitamins-Minerals (PRESERVISION AREDS 2) CAPS     hypromellose (GENTEAL) 0.3 % SOLN ophthalmic solution     warfarin (COUMADIN) 2.5 MG tablet     acetaminophen (TYLENOL) 325 MG tablet     sertraline (ZOLOFT) 25 MG tablet     nitroGLYcerin (NITROSTAT) 0.4 MG sublingual tablet     atorvastatin (LIPITOR) 20 MG tablet     metoprolol succinate (TOPROL-XL) 50 MG 24 hr tablet     amLODIPine (NORVASC) 10 MG tablet     clopidogrel (PLAVIX) 75 MG tablet     omeprazole (PRILOSEC) 20 MG CR capsule     finasteride (PROSCAR) 5 MG tablet     tamsulosin (FLOMAX) 0.4 MG capsule     order for DME     ORDER FOR DME     ORDER FOR DME     ORDER FOR DME     No current facility-administered medications for this visit.      Social History   Substance Use Topics     Smoking status: Never Smoker     Smokeless tobacco: Never Used     Alcohol use No     Review of Systems  Constitutional-No fevers, chills, or weight changes. and no appetite.  ENT-No earpain, sore throat, voice changes or rhinitis.  Cardiac-No chest pain or palpitations and Exertional SOB.  Respiratory-No cough, sob, or hemoptysis.  GI-No nausea, vomitting, diarrhea, constipation, or blood in the stool    Physical Exam  BP 98/56  Pulse 82  Resp 20  SpO2 95%  General Appearance-alert, mild distress  Cardiac-regular rate and rhythm   with normal S1, S2 ; no murmur, rub or gallops  Lungs-clear to auscultation  Extremities-1+ pitting doughy edema in both lower legs.    ASSESSMENT:  89-year-old gentleman who comes for posthospital check.  Unfortunately he has not been doing well.  He was having more weakness, and then was in rehab.  He got out of rehab 2 weeks ago but then last week had a syncopal episode came into the emergency room was found to have a pulmonary embolus.  He has started on Coumadin, initially getting 5 mg and then 2.5 mg.  His INR went up to 4.1 and a held his Coumadin.  He is noted Coumadin on the 26 27th or 28 February.  His INR today is 6.2.  Patient has a poor appetite, only getting a can of Ensure and a few bites at each meal.  The patient does need anticoagulation and with his chronic kidney disease he can only be on Coumadin.  We will restart Coumadin at 1 mg tomorrow evening recheck an INR in 2 days, and then see him back in 6 days.  Patient is encouraged to eat.    Chronic kidney disease with slight worsening today with a creatinine of 3.9 instead of 3.4.  The patient is making urine, he is drinking some fluids.  His BUN is the same at 42.  He does say of his kidney stop he would want dialysis as a trial.  He is otherwise DNR/DNI.    Diabetes-the patient's diabetes is okay with an A1c of 7.5 but his sugars are rising.  He has been on Lantus at 7 units a day we will increase him to 8 units, and may need to go up from there.    I will see the patient back in 6 days in the clinic and appointment is made for him.    Electronically signed by Sony Berkowitz MD    .

## 2018-03-02 NOTE — PROGRESS NOTES
ANTICOAGULATION FOLLOW-UP CLINIC VISIT    Patient Name:  Shaun Gotti  Date:  3/2/2018  Contact Type:  Telephone    SUBJECTIVE:     Patient Findings     Comments Reason for Call:  INR     Who is calling?  Home Care: [facility]     Phone number:  [phone]     Fax number:  NA     Name of caller: Abigail     INR Value:  4.6     Are there any other concerns:  No     Can we leave a detailed message on this number? YES     Phone number patient can be reached at: [phone]           OBJECTIVE    INR   Date Value Ref Range Status   03/02/2018 4.6  Final       ASSESSMENT / PLAN  INR assessment SUPRA    Recheck INR In: 4 DAYS    INR Location Homecare INR      Anticoagulation Summary as of 3/2/2018     INR goal 2.0-3.0   Today's INR 4.6!   Maintenance plan No maintenance plan   Full instructions 3/2: Hold; 3/3: 1 mg; 3/4: 1 mg; 3/5: 1 mg; Otherwise No maintenance plan   Next INR check 3/6/2018   Target end date     Indications   Long-term (current) use of anticoagulants [Z79.01] [Z79.01]  Acute pulmonary embolism (H) [I26.99]         Anticoagulation Episode Summary     INR check location     Preferred lab     Send INR reminders to [facility] POOL    Comments 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 PM dose      Anticoagulation Care Providers     Provider Role Specialty Phone number    Sony Berkowitz MD [facility] Internal Medicine [phone]            See the Encounter Report to view Anticoagulation Flowsheet and Dosing Calendar (Go to Encounters tab in chart review, and find the Anticoagulation Therapy Visit)    Dosage adjustment made based on physician directed care plan.    Deena Leal RN

## 2018-03-02 NOTE — MR AVS SNAPSHOT
Shaun Gotti   3/2/2018   Anticoagulation Therapy Visit    Description:  89 year old male   Provider:  Sony Berkowitz MD   Department:  Ph Anticoag           INR as of 3/2/2018     Today's INR 4.6!      Anticoagulation Summary as of 3/2/2018     INR goal 2.0-3.0   Today's INR 4.6!   Full instructions 3/2: Hold; 3/3: 1 mg; 3/4: 1 mg; 3/5: 1 mg; Otherwise No maintenance plan   Next INR check 3/6/2018    Indications   Long-term (current) use of anticoagulants [Z79.01] [Z79.01]  Acute pulmonary embolism (H) [I26.99]         Contact Numbers     Clinic Number:         March 2018 Details    Sun Mon Tue Wed Thu Fri Sat         1               2      Hold   See details      3      1 mg           4      1 mg         5      1 mg         6            7               8               9               10                 11               12               13               14               15               16               17                 18               19               20               21               22               23               24                 25               26               27               28               29               30               31                Date Details   03/02 This INR check       Date of next INR:  3/6/2018         How to take your warfarin dose     To take:  1 mg Take 1 of the 1 mg tablets.    Hold Do not take your warfarin dose. See the Details table to the right for additional instructions.

## 2018-03-02 NOTE — TELEPHONE ENCOUNTER
Reason for Call:  INR    Who is calling?  Home Care: [facility]    Phone number:  [phone]    Fax number:  NA    Name of caller: Abigail    INR Value:  4.6    Are there any other concerns:  No    Can we leave a detailed message on this number? YES    Phone number patient can be reached at: [phone]      Call taken on 3/2/2018 at 9:41 AM by Ana Sultana

## 2018-03-06 NOTE — MR AVS SNAPSHOT
Shaun Gotti   3/6/2018   Anticoagulation Therapy Visit    Description:  89 year old male   Provider:  Sony Berkowitz MD   Department:  Ph Anticoag           INR as of 3/6/2018     Today's INR 5.9!      Anticoagulation Summary as of 3/6/2018     INR goal 2.0-3.0   Today's INR 5.9!   Full instructions 3/6: Hold; 3/7: Hold; 3/8: Hold; Otherwise No maintenance plan   Next INR check 3/9/2018    Indications   Long-term (current) use of anticoagulants [Z79.01] [Z79.01]  Acute pulmonary embolism (H) [I26.99]         Contact Numbers     Clinic Number:         March 2018 Details    Sun Mon Tue Wed Thu Fri Sat         1               2               3                 4               5               6      Hold   See details      7      Hold         8      Hold         9            10                 11               12               13               14               15               16               17                 18               19               20               21               22               23               24                 25               26               27               28               29               30               31                Date Details   03/06 This INR check       Date of next INR:  3/9/2018         How to take your warfarin dose     Hold Do not take your warfarin dose. See the Details table to the right for additional instructions.

## 2018-03-06 NOTE — TELEPHONE ENCOUNTER
Reason for Call:  INR    Who is calling?  Home Care: [facility]    Phone number:  [phone]    Fax number:      Name of caller:  [place]    INR Value:  5.9     Are there any other concerns:  No    Can we leave a detailed message on this number? YES    Phone number patient can be reached at: Other phone number:       Call taken on 3/6/2018 at 11:16 AM by Natalia Villarreal

## 2018-03-06 NOTE — PROGRESS NOTES
ANTICOAGULATION FOLLOW-UP CLINIC VISIT    Patient Name:  Shaun Gotti  Date:  3/6/2018  Contact Type:  Telephone    SUBJECTIVE:     Patient Findings     Positives Unexplained INR or factor level change (Home care nurse states that she can only see him again friday. )    Comments Reason for Call:  INR     Who is calling?  Home Care: [facility]     Phone number:  [phone]     Fax number:       Name of caller:  [place]     INR Value:  5.9      Are there any other concerns:  No     Can we leave a detailed message on this number? YES     Phone number patient can be reached at: Other phone number:         Call taken on 3/6/2018 at 11:16 AM by Natalia Villarreal           OBJECTIVE    INR   Date Value Ref Range Status   03/06/2018 5.9  Final       ASSESSMENT / PLAN  INR assessment SUPRA    Recheck INR In: 3 DAYS    INR Location Homecare INR      Anticoagulation Summary as of 3/6/2018     INR goal 2.0-3.0   Today's INR 5.9!   Maintenance plan No maintenance plan   Full instructions 3/6: Hold; 3/7: Hold; 3/8: Hold; Otherwise No maintenance plan   Next INR check 3/9/2018   Target end date     Indications   Long-term (current) use of anticoagulants [Z79.01] [Z79.01]  Acute pulmonary embolism (H) [I26.99]         Anticoagulation Episode Summary     INR check location     Preferred lab     Send INR reminders to [facility] POOL    Comments 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 PM dose      Anticoagulation Care Providers     Provider Role Specialty Phone number    Sony Berkowitz MD [facility] Internal Medicine [phone]            See the Encounter Report to view Anticoagulation Flowsheet and Dosing Calendar (Go to Encounters tab in chart review, and find the Anticoagulation Therapy Visit)    Dosage adjustment made based on physician directed care plan.    Deena Leal RN

## 2018-03-09 NOTE — TELEPHONE ENCOUNTER
Reason for Call:  INR    Who is calling?  Home Care:    Phone number:  [phone]    Name of caller: [place]    INR Value:  4.2    Are there any other concerns:  No    Can we leave a detailed message on this number? YES    Phone number patient can be reached at: Home number on file [phone] (home)      Call taken on 3/9/2018 at 11:00 AM by Amber Rowan

## 2018-03-09 NOTE — MR AVS SNAPSHOT
Shaun Gotti   3/9/2018   Anticoagulation Therapy Visit    Description:  89 year old male   Provider:  Sony Berkowitz MD   Department:  Ph Anticoag           INR as of 3/9/2018     Today's INR 4.2!      Anticoagulation Summary as of 3/9/2018     INR goal 2.0-3.0   Today's INR 4.2!   Full instructions 3/9: Hold; 3/10: 0.5 mg; 3/11: 0.5 mg; 3/12: 0.5 mg; Otherwise No maintenance plan   Next INR check 3/13/2018    Indications   Long-term (current) use of anticoagulants [Z79.01] [Z79.01]  Acute pulmonary embolism (H) [I26.99]         Contact Numbers     Clinic Number:         March 2018 Details    Sun Mon Tue Wed Thu Fri Sat         1               2               3                 4               5               6               7               8               9      Hold   See details      10      0.5 mg           11      0.5 mg         12      0.5 mg         13            14               15               16               17                 18               19               20               21               22               23               24                 25               26               27               28               29               30               31                Date Details   03/09 This INR check       Date of next INR:  3/13/2018         How to take your warfarin dose     To take:  0.5 mg Take 0.5 of a 1 mg tablet.    Hold Do not take your warfarin dose. See the Details table to the right for additional instructions.

## 2018-03-09 NOTE — PROGRESS NOTES
ANTICOAGULATION FOLLOW-UP CLINIC VISIT    Patient Name:  Shaun Gotti  Date:  3/9/2018  Contact Type:  Telephone/ [facility] - homecare    SUBJECTIVE:     Patient Findings     Positives Unexplained INR or factor level change    Comments Reason for Call:  INR     Who is calling?  Home Care:     Phone number:  [phone]     Name of caller: [facility]     INR Value:  4.2     Are there any other concerns:  No     Can we leave a detailed message on this number? YES     Phone number patient can be reached at: Home number on file [phone] (home)           OBJECTIVE    INR   Date Value Ref Range Status   03/09/2018 4.2  Final       ASSESSMENT / PLAN  INR assessment SUPRA    Recheck INR In: 4 DAYS    INR Location Home[facility] INR      Anticoagulation Summary as of 3/9/2018     INR goal 2.0-3.0   Today's INR 4.2!   Maintenance plan No maintenance plan   Full instructions 3/9: Hold; 3/10: 0.5 mg; 3/11: 0.5 mg; 3/12: 0.5 mg; Otherwise No maintenance plan   Next INR check 3/13/2018   Target end date     Indications   Long-term (current) use of anticoagulants [Z79.01] [Z79.01]  Acute pulmonary embolism (H) [I26.99]         Anticoagulation Episode Summary     INR check location     Preferred lab     Send INR reminders to [facility] POOL    Comments 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 PM dose      Anticoagulation Care Providers     Provider Role Specialty Phone number    Sony Berkowitz MD [facility] Internal Medicine [phone]            See the Encounter Report to view Anticoagulation Flowsheet and Dosing Calendar (Go to Encounters tab in chart review, and find the Anticoagulation Therapy Visit)    Dosage adjustment made based on physician directed care plan.    Vivienne Leong RN

## 2018-03-15 NOTE — MR AVS SNAPSHOT
Shaun Gotti   3/15/2018   Anticoagulation Therapy Visit    Description:  89 year old male   Provider:  Sony Berkowitz MD   Department:  Ph Anticoag           INR as of 3/15/2018     Today's INR 2.0      Anticoagulation Summary as of 3/15/2018     INR goal 2.0-3.0   Today's INR 2.0   Full instructions 3/15: 0.5 mg; 3/16: 0.5 mg; 3/17: 0.5 mg; 3/18: 0.5 mg; Otherwise No maintenance plan   Next INR check 3/19/2018    Indications   Long-term (current) use of anticoagulants [Z79.01] [Z79.01]  Acute pulmonary embolism (H) [I26.99]         Contact Numbers     Clinic Number:         March 2018 Details    Sun Mon Tue Wed Thu Fri Sat         1               2               3                 4               5               6               7               8               9               10                 11               12               13               14               15      0.5 mg   See details      16      0.5 mg         17      0.5 mg           18      0.5 mg         19            20               21               22               23               24                 25               26               27               28               29               30               31                Date Details   03/15 This INR check       Date of next INR:  3/19/2018         How to take your warfarin dose     To take:  0.5 mg Take 0.5 of a 1 mg tablet.

## 2018-03-15 NOTE — PROGRESS NOTES
ANTICOAGULATION FOLLOW-UP CLINIC VISIT    Patient Name:  Shaun Gotti  Date:  3/15/2018  Contact Type:  Telephone/ [facility] nurse    SUBJECTIVE:     Patient Findings     Positives Intentional hold of therapy (held on Thurs 3/8 and Fri 3/9 due to elevated INR. He was admitted to [facility] Tues and Wed- per Magdalena, it appears they held his Coumadin while admitted. )    Comments Pt was admitted to the [facility] on Tues and Wed. Per Magdalena, it appears it was held. The only directions from the hospital they got regarding coumadin was that it needs to be held if >4.0  Ana Garces, RN            Reason for Call:  INR     Who is calling?  Home Care:  [facility]                  Phone number:  [phone]     Fax number:  NA     Name of caller: Magdalena     INR Value:  2.0     Are there any other concerns:  No     Can we leave a detailed message on this number? YES     Phone number patient can be reached at: [phone]        Call taken on 3/15/2018 at 9:14 AM by Ana Sultana              OBJECTIVE    INR   Date Value Ref Range Status   03/15/2018 2.0  Final       ASSESSMENT / PLAN  INR assessment THER    Recheck INR In: 4 DAYS    INR Location Home[facility] INR      Anticoagulation Summary as of 3/15/2018     INR goal 2.0-3.0   Today's INR 2.0   Maintenance plan No maintenance plan   Full instructions 3/15: 0.5 mg; 3/16: 0.5 mg; 3/17: 0.5 mg; 3/18: 0.5 mg; Otherwise No maintenance plan   Next INR check 3/19/2018   Target end date     Indications   Long-term (current) use of anticoagulants [Z79.01] [Z79.01]  Acute pulmonary embolism (H) [I26.99]         Anticoagulation Episode Summary     INR check location     Preferred lab     Send INR reminders to [facility] POOL    Comments 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 PM dose      Anticoagulation Care Providers     Provider Role Specialty Phone number    Sony Berkowitz MD [facility] Internal Medicine [phone]            See the Encounter Report to view Anticoagulation  Flowsheet and Dosing Calendar (Go to Encounters tab in chart review, and find the Anticoagulation Therapy Visit)    Dosage adjustment made based on physician directed care plan.        Ana Garces RN

## 2018-03-15 NOTE — TELEPHONE ENCOUNTER
Reason for Call:  INR    Who is calling?  Home Care:  [facility]     Phone number:  [phone]    Fax number:  NA    Name of caller: Magdalena    INR Value:  2.0    Are there any other concerns:  No    Can we leave a detailed message on this number? YES    Phone number patient can be reached at: [phone]      Call taken on 3/15/2018 at 9:14 AM by Ana Sultana

## 2018-03-19 NOTE — TELEPHONE ENCOUNTER
See Anticoagulation visit encounter (INR addressed). Will leave encounter open for Dr. Berkowitz to see additional information from [facility] nurse.       Deena Leal, RN- [facility] RN

## 2018-03-19 NOTE — PROGRESS NOTES
ANTICOAGULATION FOLLOW-UP CLINIC VISIT    Patient Name:  Shaun Gotti  Date:  3/19/2018  Contact Type:  Telephone    SUBJECTIVE:     Patient Findings     Positives No Problem Findings    Comments Reason for Call:  INR     Who is calling?  Mer[facility] from [facility]                     Phone number:  [phone]     Fax number:       Name of caller:      INR Value: 1.6     Are there any other concerns:  No concerns but Merced wanted Dr Berkowitz to be aware that Shaun is now on hospice as of 3.15.      Can we leave a detailed message on this number? YES     Phone number patient can be reached at:        Call taken on 3/19/2018 at 12:18 PM by Luiza Bowden           OBJECTIVE    INR   Date Value Ref Range Status   03/19/2018 1.6  Final       ASSESSMENT / PLAN  INR assessment SUB    Recheck INR In: 4 DAYS    INR Location [facility] INR      Anticoagulation Summary as of 3/19/2018     INR goal 2.0-3.0   Today's INR 1.6!   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 PM dose      Anticoagulation Care Providers     Provider Role Specialty Phone number    Sony Berkowitz MD [facility] Internal Medicine [phone]            See the Encounter Report to view Anticoagulation Flowsheet and Dosing Calendar (Go to Encounters tab in chart review, and find the Anticoagulation Therapy Visit)    Dosage adjustment made based on physician directed care plan.    Deena Leal, RN

## 2018-03-19 NOTE — TELEPHONE ENCOUNTER
Reason for Call:  INR    Who is calling?  Merced from [facility]   Phone number:  [phone]    Fax number:      Name of caller:     INR Value: 1.6    Are there any other concerns:  No concerns but Merced wanted Dr Berkowitz to be aware that Shaun is now on hospice as of 3.15.     Can we leave a detailed message on this number? YES    Phone number patient can be reached at:      Call taken on 3/19/2018 at 12:18 PM by Luiza Bowden

## 2018-03-19 NOTE — MR AVS SNAPSHOT
Shaun Gotti   3/19/2018   Anticoagulation Therapy Visit    Description:  89 year old male   Provider:  Sony Berkowitz MD   Department:  Ph Anticoag           INR as of 3/19/2018     Today's INR 1.6!      Anticoagulation Summary as of 3/19/2018     INR goal 2.0-3.0   Today's INR 1.6!   Full instructions 0.5 mg every day   Next INR check 3/23/2018    Indications   Long-term (current) use of anticoagulants [Z79.01] [Z79.01]  Acute pulmonary embolism (H) [I26.99]         Contact Numbers     Clinic Number:         March 2018 Details    Sun Mon Tue Wed Thu Fri Sat         1               2               3                 4               5               6               7               8               9               10                 11               12               13               14               15               16               17                 18               19      0.5 mg   See details      20      0.5 mg         21      0.5 mg         22      0.5 mg         23            24                 25               26               27               28               29               30               31                Date Details   03/19 This INR check       Date of next INR:  3/23/2018         How to take your warfarin dose     To take:  0.5 mg Take 0.5 of a 1 mg tablet.

## 2018-03-23 NOTE — PROGRESS NOTES
ANTICOAGULATION FOLLOW-UP CLINIC VISIT    Patient Name:  Shaun Gotti  Date:  3/23/2018  Contact Type:  Telephone    SUBJECTIVE:     Patient Findings     Positives No Problem Findings    Comments Reason for Call:  INR     Who is calling?  Home Care:      Phone number:  [phone]     Fax number:       Name of caller: [facility]     INR Value:  1.9     Are there any other concerns:  No     Can we leave a detailed message on this number? YES              OBJECTIVE    INR   Date Value Ref Range Status   03/23/2018 1.9  Final       ASSESSMENT / PLAN  INR assessment THER    Recheck INR In: 4 DAYS    INR Location [facility] INR      Anticoagulation Summary as of 3/23/2018     INR goal 2.0-3.0   Today's INR 1.9!   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 PM dose      Anticoagulation Care Providers     Provider Role Specialty Phone number    Sony Berkowitz MD [facility] Internal Medicine [phone]            See the Encounter Report to view Anticoagulation Flowsheet and Dosing Calendar (Go to Encounters tab in chart review, and find the Anticoagulation Therapy Visit)    Dosage adjustment made based on physician directed care plan.    Deena Leal RN

## 2018-03-23 NOTE — TELEPHONE ENCOUNTER
Reason for Call:  INR    Who is calling?  Home Care:     Phone number:  [phone]    Fax number:      Name of caller: [facility]    INR Value:  1.9    Are there any other concerns:  No    Can we leave a detailed message on this number? YES    Phone number patient can be reached at: Other phone number:  *      Call taken on 3/23/2018 at 10:39 AM by Katelynn Cabral

## 2018-03-23 NOTE — MR AVS SNAPSHOT
Shaun Gotti   3/23/2018   Anticoagulation Therapy Visit    Description:  89 year old male   Provider:  Sony Berkowitz MD   Department:  Ph Anticoag           INR as of 3/23/2018     Today's INR 1.9!      Anticoagulation Summary as of 3/23/2018     INR goal 2.0-3.0   Today's INR 1.9!   Full instructions 0.5 mg every day   Next INR check 3/27/2018    Indications   Long-term (current) use of anticoagulants [Z79.01] [Z79.01]  Acute pulmonary embolism (H) [I26.99]         Contact Numbers     Clinic Number:         March 2018 Details    Sun Mon Tue Wed Thu Fri Sat         1               2               3                 4               5               6               7               8               9               10                 11               12               13               14               15               16               17                 18               19               20               21               22               23      0.5 mg   See details      24      0.5 mg           25      0.5 mg         26      0.5 mg         27            28               29               30               31                Date Details   03/23 This INR check       Date of next INR:  3/27/2018         How to take your warfarin dose     To take:  0.5 mg Take 0.5 of a 1 mg tablet.

## 2018-03-26 NOTE — PROGRESS NOTES
ANTICOAGULATION FOLLOW-UP CLINIC VISIT    Patient Name:  Shaun Gotti  Date:  3/26/2018  Contact Type:  Telephone/ [facility] - homecare    SUBJECTIVE:     Patient Findings     Positives Unexplained INR or factor level change    Comments Reason for Call:  INR     Who is calling?  Home Care: [facility]     Phone number:  [phone]     Fax number:       Name of caller: [facility]     INR Value:  1.6     Are there any other concerns:  No     Can we leave a detailed message on this number? Not Applicable     Phone number patient can be reached at: Other phone number:  [phone]        Call taken on 3/26/2018 at 11:07 AM by Ghazala Coles           OBJECTIVE    INR   Date Value Ref Range Status   03/26/2018 1.6  Final       ASSESSMENT / PLAN  INR assessment SUB    Recheck INR In: 3 DAYS    INR Location [facility] INR      Anticoagulation Summary as of 3/26/2018     INR goal 2.0-3.0   Today's INR 1.6!   Maintenance plan 0.5 mg (1 mg x 0.5) every day   Full instructions 3/26: 1 mg; 3/28: 1 mg; Otherwise 0.5 mg every day   Weekly total 3.5 mg   Plan last modified Deena Leal, RN (3/19/2018)   Next INR check 3/29/2018   Target end date     Indications   Long-term (current) use of anticoagulants [Z79.01] [Z79.01]  Acute pulmonary embolism (H) [I26.99]         Anticoagulation Episode Summary     INR check location     Preferred lab     Send INR reminders to [facility] POOL    Comments 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 PM dose      Anticoagulation Care Providers     Provider Role Specialty Phone number    Sony Berkowitz MD [facility] Internal Medicine [phone]            See the Encounter Report to view Anticoagulation Flowsheet and Dosing Calendar (Go to Encounters tab in chart review, and find the Anticoagulation Therapy Visit)    Dosage adjustment made based on physician directed care plan.      Vivienne Leong, RN

## 2018-03-26 NOTE — MR AVS SNAPSHOT
Shaun Gotti   3/26/2018   Anticoagulation Therapy Visit    Description:  89 year old male   Provider:  Sony Berkowitz MD   Department:  Ph Anticoag           INR as of 3/26/2018     Today's INR 1.6!      Anticoagulation Summary as of 3/26/2018     INR goal 2.0-3.0   Today's INR 1.6!   Full instructions 3/26: 1 mg; 3/28: 1 mg; Otherwise 0.5 mg every day   Next INR check 3/29/2018    Indications   Long-term (current) use of anticoagulants [Z79.01] [Z79.01]  Acute pulmonary embolism (H) [I26.99]         Contact Numbers     Clinic Number:         March 2018 Details    Sun Mon Tue Wed Thu Fri Sat         1               2               3                 4               5               6               7               8               9               10                 11               12               13               14               15               16               17                 18               19               20               21               22               23               24                 25               26      1 mg   See details      27      0.5 mg         28      1 mg         29            30               31                Date Details   03/26 This INR check       Date of next INR:  3/29/2018         How to take your warfarin dose     To take:  0.5 mg Take 0.5 of a 1 mg tablet.    To take:  1 mg Take 1 of the 1 mg tablets.

## 2018-03-26 NOTE — TELEPHONE ENCOUNTER
Reason for Call:  INR    Who is calling?  Home Care: [facility]    Phone number:  [phone]    Fax number:      Name of caller: [facility]    INR Value:  1.6    Are there any other concerns:  No    Can we leave a detailed message on this number? Not Applicable    Phone number patient can be reached at: Other phone number:  [phone]      Call taken on 3/26/2018 at 11:07 AM by Ghazala Coles

## 2018-03-29 NOTE — TELEPHONE ENCOUNTER
Reason for Call:  INR    Who is calling?  Home Care: [facility]    Phone number:  [phone]    Fax number:  NA    Name of caller: [facility]    INR Value:  1.7    Are there any other concerns:  Yes: dosing instructions needed    Can we leave a detailed message on this number? YES    Phone number patient can be reached at: Home number on file [phone] (home)      Call taken on 3/29/2018 at 12:47 PM by Skylar Oliva

## 2018-03-29 NOTE — PROGRESS NOTES
ANTICOAGULATION FOLLOW-UP CLINIC VISIT    Patient Name:  Shaun Gotti  Date:  3/29/2018  Contact Type:  Telephone/ [facility] Nurse    SUBJECTIVE:     Patient Findings     Positives Unexplained INR or factor level change           OBJECTIVE    INR   Date Value Ref Range Status   03/29/2018 1.7  Final       ASSESSMENT / PLAN  INR assessment SUB    Recheck INR In: 5 WEEKS    INR Location [facility] INR      Anticoagulation Summary as of 3/29/2018     INR goal 2.0-3.0   Today's INR 1.7!   Maintenance plan No maintenance plan   Full instructions 3/29: 1 mg; 3/30: 1 mg; 3/31: 0.5 mg; 4/1: 1 mg; 4/2: 0.5 mg   Weekly total 3.5 mg   Plan last modified Ana Garces RN (3/29/2018)   Next INR check 4/3/2018   Target end date     Indications   Long-term (current) use of anticoagulants [Z79.01] [Z79.01]  Acute pulmonary embolism (H) [I26.99]         Anticoagulation Episode Summary     INR check location     Preferred lab     Send INR reminders to [facility] POOL    Comments 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 PM dose      Anticoagulation Care Providers     Provider Role Specialty Phone number    Sony Berkowitz MD [facility] Internal Medicine [phone]            See the Encounter Report to view Anticoagulation Flowsheet and Dosing Calendar (Go to Encounters tab in chart review, and find the Anticoagulation Therapy Visit)    Dosage adjustment made based on physician directed care plan.        Ana Garces, KASEY

## 2018-04-03 NOTE — PROGRESS NOTES
ANTICOAGULATION FOLLOW-UP CLINIC VISIT    Patient Name:  Shaun Gotti  Date:  4/3/2018  Contact Type:  Telephone/ [facility] nurse    SUBJECTIVE:     Patient Findings     Positives No Problem Findings    Comments Reason for Call:  INR     Who is calling?  Home Care:      Phone number:  [phone]     Fax number:       Name of caller: [facility]     INR Value:  2.0     Are there any other concerns:  No     Can we leave a detailed message on this number? YES     Phone number patient can be reached at: Other phone number:          Call taken on 4/3/2018 at 1:06 PM by Katelynn Cabral           OBJECTIVE    INR   Date Value Ref Range Status   04/03/2018 2.0  Final       ASSESSMENT / PLAN  INR assessment THER    Recheck INR In: 1 WEEK    INR Location [facility] INR      Anticoagulation Summary as of 4/3/2018     INR goal 2.0-3.0   Today's INR 2.0   Maintenance plan 0.5 mg (1 mg x 0.5) on Mon, Wed, Fri; 1 mg (1 mg x 1) all other days   Full instructions 0.5 mg on Mon, Wed, Fri; 1 mg all other days   Weekly total 5.5 mg   Plan last modified Ana Garces RN (4/3/2018)   Next INR check 4/10/2018   Target end date     Indications   Long-term (current) use of anticoagulants [Z79.01] [Z79.01]  Acute pulmonary embolism (H) [I26.99]         Anticoagulation Episode Summary     INR check location     Preferred lab     Send INR reminders to [facility] POOL    Comments 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 PM dose      Anticoagulation Care Providers     Provider Role Specialty Phone number    Sony Berkowitz MD [facility] Internal Medicine [phone]            See the Encounter Report to view Anticoagulation Flowsheet and Dosing Calendar (Go to Encounters tab in chart review, and find the Anticoagulation Therapy Visit)    Dosage adjustment made based on physician directed care plan.      Ana Garces, RN

## 2018-04-03 NOTE — TELEPHONE ENCOUNTER
Reason for Call:  INR    Who is calling?  Home Care:     Phone number:  [phone]    Fax number:      Name of caller: [facility]    INR Value:  2.0    Are there any other concerns:  No    Can we leave a detailed message on this number? YES    Phone number patient can be reached at: Other phone number:        Call taken on 4/3/2018 at 1:06 PM by Katelynn Cabral

## 2018-04-03 NOTE — MR AVS SNAPSHOT
Shaun Gotti   4/3/2018   Anticoagulation Therapy Visit    Description:  89 year old male   Provider:  Sony Berkowitz MD   Department:  Ph Anticoag           INR as of 4/3/2018     Today's INR 2.0      Anticoagulation Summary as of 4/3/2018     INR goal 2.0-3.0   Today's INR 2.0   Full instructions 0.5 mg on Mon, Wed, Fri; 1 mg all other days   Next INR check 4/10/2018    Indications   Long-term (current) use of anticoagulants [Z79.01] [Z79.01]  Acute pulmonary embolism (H) [I26.99]         Contact Numbers     Clinic Number:         April 2018 Details    Sun Mon Tue Wed Thu Fri Sat     1               2               3      1 mg   See details      4      0.5 mg         5      1 mg         6      0.5 mg         7      1 mg           8      1 mg         9      0.5 mg         10            11               12               13               14                 15               16               17               18               19               20               21                 22               23               24               25               26               27               28                 29               30                     Date Details   04/03 This INR check       Date of next INR:  4/10/2018         How to take your warfarin dose     To take:  0.5 mg Take 0.5 of a 1 mg tablet.    To take:  1 mg Take 1 of the 1 mg tablets.

## 2018-04-10 NOTE — PROGRESS NOTES
ANTICOAGULATION FOLLOW-UP CLINIC VISIT    Patient Name:  Shaun Gotti  Date:  4/10/2018  Contact Type:  Face to Face    SUBJECTIVE:     Patient Findings     Positives No Problem Findings    Comments Reason for Call:  INR     Who is calling?  Home Care:      Phone number:       Fax number:      Name of caller: [facility]     INR Value:  2.0     Are there any other concerns:  No     Can we leave a detailed message on this number? YES     Phone number patient can be reached at: Other phone number:  [phone]     Call taken on 4/10/2018 at 1:45 PM by Luiza Bowden           OBJECTIVE    INR   Date Value Ref Range Status   04/10/2018 2.0  Final       ASSESSMENT / PLAN  INR assessment THER    Recheck INR In: 1 WEEK    INR Location Homecare INR      Anticoagulation Summary as of 4/10/2018     INR goal 2.0-3.0   Today's INR 2.0   Maintenance plan 0.5 mg (1 mg x 0.5) on Mon, Wed, Fri; 1 mg (1 mg x 1) all other days   Full instructions 0.5 mg on Mon, Wed, Fri; 1 mg all other days   Weekly total 5.5 mg   No change documented Deena Leal RN   Plan last modified Ana Garces RN (4/3/2018)   Next INR check 4/17/2018   Target end date     Indications   Long-term (current) use of anticoagulants [Z79.01] [Z79.01]  Acute pulmonary embolism (H) [I26.99]         Anticoagulation Episode Summary     INR check location     Preferred lab     Send INR reminders to [facility]    Comments 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 PM dose      Anticoagulation Care Providers     Provider Role Specialty Phone number    Sony Berkowitz MD [facility] Internal Medicine [phone]            See the Encounter Report to view Anticoagulation Flowsheet and Dosing Calendar (Go to Encounters tab in chart review, and find the Anticoagulation Therapy Visit)    Dosage adjustment made based on physician directed care plan.    Deena Leal, RN

## 2018-04-10 NOTE — TELEPHONE ENCOUNTER
Reason for Call:  INR    Who is calling?  Home Care:     Phone number:      Fax number:     Name of caller: [facility]    INR Value:  2.0    Are there any other concerns:  No    Can we leave a detailed message on this number? YES    Phone number patient can be reached at: Other phone number:  [phone]    Call taken on 4/10/2018 at 1:45 PM by Luiza Bowden

## 2018-04-10 NOTE — MR AVS SNAPSHOT
Shaun Gotti   4/10/2018   Anticoagulation Therapy Visit    Description:  89 year old male   Provider:  Sony Berkowitz MD   Department:  Ph Anticoag           INR as of 4/10/2018     Today's INR 2.0      Anticoagulation Summary as of 4/10/2018     INR goal 2.0-3.0   Today's INR 2.0   Full instructions 0.5 mg on Mon, Wed, Fri; 1 mg all other days   Next INR check 4/17/2018    Indications   Long-term (current) use of anticoagulants [Z79.01] [Z79.01]  Acute pulmonary embolism (H) [I26.99]         Contact Numbers     Clinic Number:         April 2018 Details    Sun Mon Tue Wed Thu Fri Sat     1               2               3               4               5               6               7                 8               9               10      1 mg   See details      11      0.5 mg         12      1 mg         13      0.5 mg         14      1 mg           15      1 mg         16      0.5 mg         17            18               19               20               21                 22               23               24               25               26               27               28                 29               30                     Date Details   04/10 This INR check       Date of next INR:  4/17/2018         How to take your warfarin dose     To take:  0.5 mg Take 0.5 of a 1 mg tablet.    To take:  1 mg Take 1 of the 1 mg tablets.

## 2018-04-17 NOTE — TELEPHONE ENCOUNTER
Reason for Call:  INR    Who is calling?  Home Care: [facility]    Phone number:  [phone]  Fax number:      Name of caller: [place]    INR Value:  1.9    Are there any other concerns:  No    Can we leave a detailed message on this number? YES    Call taken on 4/17/2018 at 10:33 AM by Adela Magaña

## 2018-04-17 NOTE — MR AVS SNAPSHOT
Shaun Gotti   4/17/2018   Anticoagulation Therapy Visit    Description:  89 year old male   Provider:  Sony Berkowitz MD   Department:  Ph Anticoag           INR as of 4/17/2018     Today's INR 1.9!      Anticoagulation Summary as of 4/17/2018     INR goal 2.0-3.0   Today's INR 1.9!   Full instructions 0.5 mg on Mon, Fri; 1 mg all other days   Next INR check 4/24/2018    Indications   Long-term (current) use of anticoagulants [Z79.01] [Z79.01]  Acute pulmonary embolism (H) [I26.99]         Contact Numbers     Clinic Number:         April 2018 Details    Sun Mon Tue Wed Thu Fri Sat     1               2               3               4               5               6               7                 8               9               10               11               12               13               14                 15               16               17      1 mg   See details      18      1 mg         19      1 mg         20      0.5 mg         21      1 mg           22      1 mg         23      0.5 mg         24            25               26               27               28                 29               30                     Date Details   04/17 This INR check       Date of next INR:  4/24/2018         How to take your warfarin dose     To take:  0.5 mg Take 0.5 of a 1 mg tablet.    To take:  1 mg Take 1 of the 1 mg tablets.

## 2018-04-17 NOTE — PROGRESS NOTES
ANTICOAGULATION FOLLOW-UP CLINIC VISIT    Patient Name:  Shaun Gotti  Date:  4/17/2018  Contact Type:  Telephone    SUBJECTIVE:     Patient Findings     Positives No Problem Findings    Comments Will try increasing dose one day and see if we can get him more mid range.     Reason for Call:  INR     Who is calling?  Home Care: [facility]     Phone number:  [phone]  Fax number:       Name of caller: [place]     INR Value:  1.9     Are there any other concerns:  No     Can we leave a detailed message on this number? YES           OBJECTIVE    INR   Date Value Ref Range Status   04/17/2018 1.9  Final       ASSESSMENT / PLAN  INR assessment THER    Recheck INR In: 1 WEEK    INR Location Home[facility] INR      Anticoagulation Summary as of 4/17/2018     INR goal 2.0-3.0   Today's INR 1.9!   Maintenance plan 0.5 mg (1 mg x 0.5) on Mon, Fri; 1 mg (1 mg x 1) all other days   Full instructions 0.5 mg on Mon, Fri; 1 mg all other days   Weekly total 6 mg   Plan last modified Deena Leal, RN (4/17/2018)   Next INR check    Target end date     Indications   Long-term (current) use of anticoagulants [Z79.01] [Z79.01]  Acute pulmonary embolism (H) [I26.99]         Anticoagulation Episode Summary     INR check location     Preferred lab     Send INR reminders to [facility]    Comments 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 PM dose      Anticoagulation Care Providers     Provider Role Specialty Phone number    Sony Berkowitz MD [facility] Internal Medicine [phone]            See the Encounter Report to view Anticoagulation Flowsheet and Dosing Calendar (Go to Encounters tab in chart review, and find the Anticoagulation Therapy Visit)    Dosage adjustment made based on physician directed care plan.    Deena Leal, RN

## 2018-04-23 NOTE — PROGRESS NOTES
ANTICOAGULATION FOLLOW-UP CLINIC VISIT    Patient Name:  Shaun Gotti  Date:  4/23/2018  Contact Type:  Telephone/ Ana Laura,  nurse    SUBJECTIVE:     Patient Findings     Positives No Problem Findings    Comments Reason for Call:  INR     Who is calling?  [employer]     Phone number:  [phone]     Fax number:       Name of caller: Ana Laura     INR Value:  2.0     Are there any other concerns:  No     Can we leave a detailed message on this number? YES     Phone number patient can be reached at:     Call taken on 4/23/2018 at 2:17 PM by Luiza Bowden              OBJECTIVE    INR   Date Value Ref Range Status   04/23/2018 2.0  Final       ASSESSMENT / PLAN  INR assessment THER    Recheck INR In: 1 WEEK    INR Location [facility] INR      Anticoagulation Summary as of 4/23/2018     INR goal 2.0-3.0   Today's INR 2.0   Maintenance plan 0.5 mg (1 mg x 0.5) on Fri; 1 mg (1 mg x 1) all other days   Full instructions 0.5 mg on Fri; 1 mg all other days   Weekly total 6.5 mg   Plan last modified Ana Garces RN (4/23/2018)   Next INR check 4/30/2018   Target end date     Indications   Long-term (current) use of anticoagulants [Z79.01] [Z79.01]  Acute pulmonary embolism (H) [I26.99]         Anticoagulation Episode Summary     INR check location     Preferred lab     Send INR reminders to [facility]    Comments 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 PM dose      Anticoagulation Care Providers     Provider Role Specialty Phone number    Sony Berkowitz MD [facility] Internal Medicine [phone]            See the Encounter Report to view Anticoagulation Flowsheet and Dosing Calendar (Go to Encounters tab in chart review, and find the Anticoagulation Therapy Visit)    Dosage adjustment made based on physician directed care plan.        Ana Garces, KASEY

## 2018-04-23 NOTE — TELEPHONE ENCOUNTER
Reason for Call:  INR    Who is calling?  [employer]    Phone number:  [phone]    Fax number:      Name of caller: Ana Laura    INR Value:  2.0    Are there any other concerns:  No    Can we leave a detailed message on this number? YES    Phone number patient can be reached at:    Call taken on 4/23/2018 at 2:17 PM by Luiza Bowden

## 2018-04-23 NOTE — MR AVS SNAPSHOT
Shaun Gotti   4/23/2018   Anticoagulation Therapy Visit    Description:  89 year old male   Provider:  Sony Berkowitz MD   Department:  Ph Anticoag           INR as of 4/23/2018     Today's INR 2.0      Anticoagulation Summary as of 4/23/2018     INR goal 2.0-3.0   Today's INR 2.0   Full instructions 0.5 mg on Fri; 1 mg all other days   Next INR check 4/30/2018    Indications   Long-term (current) use of anticoagulants [Z79.01] [Z79.01]  Acute pulmonary embolism (H) [I26.99]         Contact Numbers     Clinic Number:         April 2018 Details    Sun Mon Tue Wed Thu Fri Sat     1               2               3               4               5               6               7                 8               9               10               11               12               13               14                 15               16               17               18               19               20               21                 22               23      1 mg   See details      24      1 mg         25      1 mg         26      1 mg         27      0.5 mg         28      1 mg           29      1 mg         30                  Date Details   04/23 This INR check       Date of next INR:  4/30/2018         How to take your warfarin dose     To take:  0.5 mg Take 0.5 of a 1 mg tablet.    To take:  1 mg Take 1 of the 1 mg tablets.

## 2018-04-30 NOTE — MR AVS SNAPSHOT
Shaun Gotti   4/30/2018   Anticoagulation Therapy Visit    Description:  89 year old male   Provider:  Sony Berkowitz MD   Department:  Ph Anticoag           INR as of 4/30/2018     Today's INR 2.9      Anticoagulation Summary as of 4/30/2018     INR goal 2.0-3.0   Today's INR 2.9   Full instructions 0.5 mg on Fri; 1 mg all other days   Next INR check 5/7/2018    Indications   Long-term (current) use of anticoagulants [Z79.01] [Z79.01]  Acute pulmonary embolism (H) [I26.99]         Contact Numbers     Clinic Number:         April 2018 Details    Sun Mon Tue Wed Thu Fri Sat     1               2               3               4               5               6               7                 8               9               10               11               12               13               14                 15               16               17               18               19               20               21                 22               23               24               25               26               27               28                 29               30      1 mg   See details            Date Details   04/30 This INR check               How to take your warfarin dose     To take:  1 mg Take 1 of the 1 mg tablets.           May 2018 Details    Sun Mon Tue Wed Thu Fri Sat       1      1 mg         2      1 mg         3      1 mg         4      0.5 mg         5      1 mg           6      1 mg         7            8               9               10               11               12                 13               14               15               16               17               18               19                 20               21               22               23               24               25               26                 27               28               29               30               31                  Date Details   No additional details    Date of next INR:  5/7/2018         How  to take your warfarin dose     To take:  0.5 mg Take 0.5 of a 1 mg tablet.    To take:  1 mg Take 1 of the 1 mg tablets.

## 2018-04-30 NOTE — PROGRESS NOTES
ANTICOAGULATION FOLLOW-UP CLINIC VISIT    Patient Name:  Shaun Gotti  Date:  4/30/2018  Contact Type:  Telephone    SUBJECTIVE:     Patient Findings     Positives No Problem Findings    Comments Reason for Call:  INR     Who is calling?  Home Care: [facility]     Phone number:  [phone]     Name of caller: marni     INR Value:  2.9     Are there any other concerns:  No     Can we leave a detailed message on this number? YES     Phone number patient can be reached at: Home number on file [phone] (home)           OBJECTIVE    INR   Date Value Ref Range Status   04/30/2018 2.9  Final       ASSESSMENT / PLAN  INR assessment THER    Recheck INR In: 1 WEEK    INR Location [facility] INR      Anticoagulation Summary as of 4/30/2018     INR goal 2.0-3.0   Today's INR 2.9   Maintenance plan 0.5 mg (1 mg x 0.5) on Fri; 1 mg (1 mg x 1) all other days   Full instructions 0.5 mg on Fri; 1 mg all other days   Weekly total 6.5 mg   No change documented Deena Leal RN   Plan last modified Ana Garces RN (4/23/2018)   Next INR check 5/7/2018   Target end date     Indications   Long-term (current) use of anticoagulants [Z79.01] [Z79.01]  Acute pulmonary embolism (H) [I26.99]         Anticoagulation Episode Summary     INR check location     Preferred lab     Send INR reminders to [facility]    Comments 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 PM dose      Anticoagulation Care Providers     Provider Role Specialty Phone number    Sony Berkowitz MD [facility] Internal Medicine [phone]            See the Encounter Report to view Anticoagulation Flowsheet and Dosing Calendar (Go to Encounters tab in chart review, and find the Anticoagulation Therapy Visit)    Dosage adjustment made based on physician directed care plan.    Deena Leal, RN

## 2018-04-30 NOTE — TELEPHONE ENCOUNTER
Reason for Call:  INR    Who is calling?  Home Care: [facility]    Phone number:  [phone]    Name of caller: marni    INR Value:  2.9    Are there any other concerns:  No    Can we leave a detailed message on this number? YES    Phone number patient can be reached at: Home number on file [phone] (home)      Call taken on 4/30/2018 at 2:00 PM by Luiza Cardona

## 2018-05-08 NOTE — TELEPHONE ENCOUNTER
Reason for Call:  INR    Who is calling?  [facility]    Phone number:      Fax number:  None    Name of caller: Ana Laura    INR Value:  3.4    Are there any other concerns:  No    Can we leave a detailed message on this number? YES          Call taken on 5/8/2018 at 2:21 PM by Joan Woodard

## 2018-05-08 NOTE — MR AVS SNAPSHOT
Shaun Gotti   5/8/2018   Anticoagulation Therapy Visit    Description:  89 year old male   Provider:  Sony Berkowitz MD   Department:  Ph Anticoag           INR as of 5/8/2018     Today's INR 3.4!      Anticoagulation Summary as of 5/8/2018     INR goal 2.0-3.0   Today's INR 3.4!   Full instructions 5/8: 0.5 mg; Otherwise 0.5 mg on Fri; 1 mg all other days   Next INR check 5/15/2018    Indications   Long-term (current) use of anticoagulants [Z79.01] [Z79.01]  Acute pulmonary embolism (H) [I26.99]         Contact Numbers     Clinic Number:         May 2018 Details    Sun Mon Tue Wed Thu Fri Sat       1               2               3               4               5                 6               7               8      0.5 mg   See details      9      1 mg         10      1 mg         11      0.5 mg         12      1 mg           13      1 mg         14      1 mg         15            16               17               18               19                 20               21               22               23               24               25               26                 27               28               29               30               31                  Date Details   05/08 This INR check       Date of next INR:  5/15/2018         How to take your warfarin dose     To take:  0.5 mg Take 0.5 of a 1 mg tablet.    To take:  1 mg Take 1 of the 1 mg tablets.

## 2018-05-14 NOTE — PROGRESS NOTES
ANTICOAGULATION FOLLOW-UP CLINIC VISIT    Patient Name:  Shaun Gotti  Date:  5/14/2018  Contact Type:  Telephone/ [facility]care - [facility]    SUBJECTIVE:     Patient Findings     Positives No Problem Findings    Comments        Reason for Call:  INR     Who is calling?  HOME CARE        Phone number:  [phone]     Fax number:  none     Name of caller: Ana Laura     INR Value:  3.0     Are there any other concerns:  No     Can we leave a detailed message on this number? YES              Call taken on 5/14/2018 at 1:47 PM by Joan Woodard                   OBJECTIVE    INR   Date Value Ref Range Status   05/14/2018 3.0  Final       ASSESSMENT / PLAN  INR assessment THER    Recheck INR In: 1 WEEK    INR Location [facility] INR      Anticoagulation Summary as of 5/14/2018     INR goal 2.0-3.0   Today's INR 3.0   Maintenance plan 0.5 mg (1 mg x 0.5) on Fri; 1 mg (1 mg x 1) all other days   Full instructions 0.5 mg on Fri; 1 mg all other days   Weekly total 6.5 mg   No change documented Vivienne Leong RN   Plan last modified Ana Garces RN (4/23/2018)   Next INR check 5/21/2018   Target end date     Indications   Long-term (current) use of anticoagulants [Z79.01] [Z79.01]  Acute pulmonary embolism (H) [I26.99]         Anticoagulation Episode Summary     INR check location     Preferred lab     Send INR reminders to [facility]    Comments 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 PM dose      Anticoagulation Care Providers     Provider Role Specialty Phone number    Sony Berkowitz MD [facility] Internal Medicine [phone]            See the Encounter Report to view Anticoagulation Flowsheet and Dosing Calendar (Go to Encounters tab in chart review, and find the Anticoagulation Therapy Visit)    Dosage adjustment made based on physician directed care plan.    Vivienne Leong, RN

## 2018-05-14 NOTE — MR AVS SNAPSHOT
Shaun Gotti   5/14/2018   Anticoagulation Therapy Visit    Description:  89 year old male   Provider:  Sony Berkowitz MD   Department:  [facility]           INR as of 5/14/2018     Today's INR 3.0      Anticoagulation Summary as of 5/14/2018     INR goal 2.0-3.0   Today's INR 3.0   Full instructions 0.5 mg on Fri; 1 mg all other days   Next INR check 5/21/2018    Indications   Long-term (current) use of anticoagulants [Z79.01] [Z79.01]  Acute pulmonary embolism (H) [I26.99]         Contact Numbers     Clinic Number:         May 2018 Details    Sun Mon Tue Wed Thu Fri Sat       1               2               3               4               5                 6               7               8               9               10               11               12                 13               14      1 mg   See details      15      1 mg         16      1 mg         17      1 mg         18      0.5 mg         19      1 mg           20      1 mg         21            22               23               24               25               26                 27               28               29               30               31                  Date Details   05/14 This INR check       Date of next INR:  5/21/2018         How to take your warfarin dose     To take:  0.5 mg Take 0.5 of a 1 mg tablet.    To take:  1 mg Take 1 of the 1 mg tablets.

## 2018-05-14 NOTE — TELEPHONE ENCOUNTER
Reason for Call:  INR    Who is calling?  HOME CARE      Phone number:  [phone]    Fax number:  none    Name of caller: Ana Laura    INR Value:  3.0    Are there any other concerns:  No    Can we leave a detailed message on this number? YES          Call taken on 5/14/2018 at 1:47 PM by Joan Woodard

## 2018-05-21 NOTE — TELEPHONE ENCOUNTER
Pt is on hospice and no longer taking any medications, therefore will not long be needing INRs.  hospice doctor approved this. Antico episode resolved.   Routed to PL as an FYI  Ana Garces RN

## 2018-05-21 NOTE — TELEPHONE ENCOUNTER
Reason for Call: Request for an order or referral:    Order or referral being requested: Ana Laura from homecare is requesting INR orders to be stopped due to patient being on Hospice & is no longer needed    Date needed: as soon as possible    Has the patient been seen by the PCP for this problem? YES    Additional comments:     Phone number Patient can be reached at:  Other phone number:  [phone]*    Best Time:      Can we leave a detailed message on this number?  YES    Call taken on 5/21/2018 at 12:31 PM by Katelynn Cabral

## 2018-06-07 ENCOUNTER — PATIENT OUTREACH (OUTPATIENT)
Dept: CARE COORDINATION | Facility: CLINIC | Age: 83
End: 2018-06-07

## 2018-07-03 NOTE — PLAN OF CARE
[facility] Progress Note  Chief Complaint: SOB  ICU Admit: 6/27/18    HPI: 76-year-old male with history of DM. CAD s/p CABG 2013, Hypertension, CHRISTIAN not on cpap, CKD 2-3, Chronic AF on AC, bullous pemphigoid on chronic prednisone, chronic osteomyelitis s/p multiple debridements 3/16/18, 4/2/18, 4/23/18 of L tibia with subsequent skin grafting from left thigh 5/16/2018. He presented from NH with SOB.  Intitially tachycardic and hypotensive with elevated lactate concerning for sepsis. CXR with bibasilar opacity. Trop peaked at 13. Volume expanded - attempting to diurese - less than robust response. Procal increased to 20, CT chest abd pelvis did not identify discreet source infection. CT chest with GGO, compressive atelectasis,vascular congestion and interlobular septal thickening.    Interval events: patient denies any fever,SOB, coughing or chest pain or soaking from wound, slept well over night    Impression:  -- Severe sepsis   -- NSTEMI/Demand ischemia - trop pk 13 but dropped to 5.5 on 6/29/18  -- LE Recent osteomyelitis s/p multiple debridements and skin grafting  -- HFrEF (ECHO 6/27; LVEF 37% down from 45%  -- Bullous pemphigoid on chronic prednisone   -- Chronic AF on AC  -- CAD CABG 2013  -- Chronic LBBB  -- CKD 2-3  -- DM  -- CHRISTIAN (no home CPAP)   -- Anemia  -- Hx normopressure hydrocephalus with shunt 2013. Short term memory loss with agitation and aggression at times (per psych note in May).        Assessment / Plan By System:  Neuro: Alert, oriented to day, location, events.  -- Continue PTA Valproic acid, prozac  -- Gen debility - nonambulatory    Pulmonary: RR of 23, SpO2 of 94. CXR retrocardiac opacity with vascular congestion. CT chest revealed compressive atelectasis with bilateral GGO and interlobar septal thickening - no prior CT for comparison, was  many years.   -- Oxygen for sat < 92%  -- Bipap prn  --  CV: SR, normotensive.  Lactic acid normalized  Trop trending  Problem: Patient Care Overview  Goal: Plan of Care/Patient Progress Review  S-(situation): Pt is scheduled for morning PT session.    B-(background): Hospitalized due to acute pulmonary emboli complicated by hypoxia, syncope and severely elevated lactic acid level consistent with severe tissue hypoxia and hypoperfusion.  Was evaluated by PT on 2/23/18 with therapy goals to return pt to baseline level of activity.    A-(assessment): When writer arrived to room, pt was sleeping in bed.  Writer spoke with pt and he stated he wants to wait and try to participate later in the day.      R-(recommendations): PT will attempt this afternoon for therapy treatment session with pt.  Per nursing notes, pt continues to require at least 1 assist to get to and from the bathroom.    Thank you for your referral.    Basilia Barney, PT, DPT  [facility]  [phone]        Physical Therapy Discharge Summary    Reason for therapy discharge:    Discharged to home with home therapy.    Progress towards therapy goal(s). See goals on Care Plan in [facility] electronic health record for goal details.  Goals not met.  Barriers to achieving goals:   discharge from facility.    Therapy recommendation(s):    Continued therapy is recommended.  Rationale/Recommendations:  strength and activity tolerance..     Thank you for your referral.    Basilia Barney, PT, DPT  [facility]  [phone]         down at 5.5 on 6/29/18. NSTEMI  -> 1443->1836.  Volume overload - Neg 1900cc/24 hrs. No fine crackles heard on auscultation of both lung bases  -- on Lasix  60 mg IV BID since 7/02/18  -- Continue aspirin/statin and Eliquis  -- heparin gtt completed for NSTEMI  -- Ischemic W/U once stable - per cardiology  -- Holding hydralazine  -- consult cardiology    Renal: CKD 3 Scr 1.55, BUN 55. Diurese as above.   -- Carrillo for accurate I&O  -- Resumed  metolazone M W F    GI: Obese, non tender. + BM  -- Diet + PO supplement    Heme: Chronic anemia with Hgb trending up to  8.5 from 7.6 on 6/30/18    Endocrine: Hx DM on insulin. Chronic steroids due to Bullous pemphigoid. BS remain elevated  -- lantus 30 units nightly  -- Continue SSI  -- Prednisone home dose    ID:  Leukocytosis resolved. Afebrile. Severe sepsis possible pneumonia vs cellulitis. UA not suspicious for infection Osteomyelitis hx as above. Plastics evaluated skin graft, do not suspect infection. MRSA screen +. CT chest abd pelvis showed pulmonary abnormalities as above   -- procalcitonin 3.43  -- on Cefepime, culture showed many pseudomonas aeruginosa, few staph aureus and few enterococcus raffinosus  -- ID following  -- Wound care following    Disposition: Transfer      BEST PRACTICES:  - VTE prophylaxis:  no SCDS with skin graft, poor skin integrity - anticoagulated  - SUP: not indicated  - LDA: piv, carrillo  - Nutrition: diet  - Therapy/mobilization: PT/OT  - Goals of care note documented: see separate note  ================================================================  Functional Status prior to admission: from NH cognitive / behavioral dysfunction   ================================================================  I/O last 3 completed shifts:  In: 810 [P.O.:360; I.V.:100; IV Piggyback:350]  Out: 4000 [Urine:4000]  No intake/output data recorded.    Vital Last Value 24 Hour Range   Temperature 98.2 °F (36.8 °C) (07/02/18 0700) Temp  Min: 95.9 °F (35.5  °C)  Max: 98.2 °F (36.8 °C)   Pulse 82 (07/02/18 1400) Pulse  Min: 71  Max: 82   Respiratory (!) 31 (07/02/18 1400) Resp  Min: 20  Max: 32   Non-Invasive  Blood Pressure 165/88 (07/02/18 1400) BP  Min: 142/68  Max: 168/86   Pulse Oximetry 96 % (07/02/18 0811) SpO2  Min: 92 %  Max: 99 %   Arterial   Blood Pressure   No Data Recorded      Physical Exam:   General: Chronically ill-appearing male but stable  Neuro: Awake, alert and oriented. MAIN.   HENT: CAROL ANN. Mucous membranes dry   Lungs: diminished, no tachypnea  Chest wall: No tenderness or deformity   Heart: very distant, regular. No murmur heard  Abdomen: Soft, non-tender, obese, bowel sounds active  Extremities: BLE warm erythema. LLE with healing wound post skin grafting. Blisters with open areas LLE>RLE. No purulent drainage noted. LE Edema +2-3 bilaterally  Pulses: 1+ and symmetric all extremities    Skin: Warm     Pertinent Reviewed: Allergies, Medications and Labs    Mathieu Vanegas MD  Internal Medicine, PGY-1  [phone]          ACCS Attestation       Mr. Neely is critically ill as documented above. I evaluated the patient with Dr. Vanegas and reviewed imaging and laboratory data. Together, we formulated the diagnostic and therapeutic strategy documented above.  Critical care services I provided [id number] ([facility] hospital care, level III).  Time required for my critical care services which I have documented is not included in charges for critical care services rendered by other clinicians.

## 2023-04-07 NOTE — TELEPHONE ENCOUNTER
It was just a recheck today, if he is doing better ok to cancel today's visit.    
Pt daughter was advise and verbalized understanding. Appt cancelled.   
Reason for Call:  Other appointment  Today at 3:00pm    Detailed comments: Wondering what this appt entails and if it is necessary due to weather and how hard it is to transport him.  Qian states he seems to have more strength the last few days and this morning he ate well.     Please call and advise    Phone Number Patient can be reached at: [phone]    Best Time: soon    Can we leave a detailed message on this number? YES    Call taken on 3/6/2018 at 11:58 AM by Ana Sultana      
4 or more times a week

## 2023-11-17 NOTE — ED AVS SNAPSHOT
[facility]    [address] [place] [address]    Phone:  [phone]    Fax:  [phone]                                       Shaun Gotti   MRN: [id number]    Department:  [facility]   Date of Visit:  2/22/2017           Patient Information     Date Of Birth          5/23/1925        Your diagnoses for this visit were:     Generalized muscle weakness        You were seen by Stephan Rogel MD.      Follow-up Information     Schedule an appointment as soon as possible for a visit with Sony Berkowitz MD.    Specialty:  Internal Medicine    Why:  As needed    Contact information:    [facility]  [address] [place] [id number] [phone]          Discharge Instructions         Weakness due to illness  Based on your exam today, I think that you are safe to return home.  Your weakness does not seem to be a sign of a serious illness at this time. Keep an eye on your symptoms and get medical advice as instructed below.  Home care    Rest at home today. Do not over-exert yourself.    Take any medicine as prescribed.    For the next few days, drink extra fluids (unless your healthcare provider wants you to restrict fluids for other reasons). Do not skip meals.  Follow-up care  Follow up with your healthcare provider or as advised.  When to seek medical advice  Call your healthcare provider for any of the following    Worsening of your symptoms    Symptoms don't start getting better within 2 days    Fever of 100.4  F (38  C) or higher, or as directed by your healthcare provider     Call 911  Get emergency medical care for any of these:    Chest, arm, neck, jaw or upper back pain    Trouble breathing    Numbness or weakness of the face, one arm or one leg    Slurred speech, confusion, trouble speaking, walking or seeing    Blood in vomit or stool (black or red color)    Loss of consciousness      Thank you for choosing  Mother states tripped and lacerated his chin at school  Denies any LOC our Emergency Department for your care.     Sincerely,    Dr Desmond Rogel M.D.        Future Appointments        Provider Department Dept Phone Center    3/16/2017 2:45 PM [facility] AT [place] [phone] [facility]      24 Hour Appointment Hotline       To make an appointment at any [facility], call [phone] ([phone]). If you don't have a family doctor or clinic, we will help you find one. [facility] are conveniently located to serve the needs of you and your family.             Review of your medicines      Our records show that you are taking the medicines listed below. If these are incorrect, please call your family doctor or clinic.        Dose / Directions Last dose taken    acetaminophen 500 MG tablet   Commonly known as:  TYLENOL        1 TABLET THREE TIMES A DAY. DO NOT EXCEED 4000MG OF ACETAMINOPHEN IN A 24 HOUR PERIOD.   Refills:  0        amLODIPine 10 MG tablet   Commonly known as:  NORVASC        1 TABLET DAILY   Refills:  0        aspirin 81 MG tablet        1 TABLET DAILY   Refills:  0        ATORVASTATIN CALCIUM PO   Dose:  20 mg        Take 20 mg by mouth daily   Refills:  0        clopidogrel 75 MG tablet   Commonly known as:  PLAVIX        1 TABLET DAILY   Refills:  0        finasteride 5 MG tablet   Commonly known as:  PROSCAR   Quantity:  30        1 TABLET DAILY   Refills:  0        hypromellose 0.3 % Soln ophthalmic solution   Commonly known as:  GENTEAL   Dose:  1 drop        1 drop 4 times daily   Refills:  0        LANTUS VIAL 100 UNIT/ML injection   Dose:  20 Units   Generic drug:  insulin glargine        Inject 20 Units Subcutaneous every morning   Refills:  0        METOPROLOL SUCCINATE PO   Dose:  50 mg        Take 50 mg by mouth daily.   Refills:  0        NEPHROCAPS PO        1 CAPSULE DAILY   Refills:  0        nitroglycerin 0.4 MG sublingual tablet   Commonly known as:  NITROSTAT        1 TAB EVERY 5 MIN  AS NEEDED FOR CHEST PAIN, UP TO 3 PER EPISODE   Refills:  0        OCUVITE ADULT FORMULA PO        1 TABLET BY MOUTH TWICE DAILY   Refills:  0        omeprazole 20 MG CR capsule   Commonly known as:  priLOSEC        1 CAPSULE DAILY   Refills:  0        * order for DME   Quantity:  1 Device        Equipment being ordered: 4 wheel walker with brakes and seat   Refills:  0        * order for DME   Quantity:  1 Device        Equipment being ordered: walker   Refills:  0        * order for DME   Quantity:  1 Device        Equipment being ordered: transfer bridge   Refills:  0        sertraline 25 MG tablet   Commonly known as:  ZOLOFT   Dose:  25 mg   Quantity:  30 tablet        Take 1 tablet (25 mg) by mouth daily   Refills:  0        TAMSULOSIN HCL CAPS 0.4 MG OR        1 CAPSULE DAILY   Refills:  0        VITAMIN D (CHOLECALCIFEROL) PO   Dose:  1000 Units        Take 1,000 Units by mouth 2 times daily. Take 2 tablets twice daily for 30 days, then take 1 tablet twice daily.   Refills:  0        * Notice:  This list has 3 medication(s) that are the same as other medications prescribed for you. Read the directions carefully, and ask your doctor or other care provider to review them with you.            Procedures and tests performed during your visit     CBC with platelets differential    Comprehensive metabolic panel    EKG 12-lead, tracing only    Lipase    Nt probnp inpatient    Occult blood stool    Peripheral IV catheter    Troponin I    UA with Microscopic      Orders Needing Specimen Collection     None      Pending Results     No orders found from 2/20/2017 to 2/23/2017.            Pending Culture Results     No orders found from 2/20/2017 to 2/23/2017.            Thank you for choosing [facility]       Thank you for choosing [facility] for your care. Our goal is always to provide you with excellent care. Hearing back from our patients is one way we can continue to improve our services. Please take a few minutes to  "complete the written survey that you may receive in the mail after you visit with us. Thank you!        [facility] [employer] Messenger Information     [employer] lets you send messages to your doctor, view your test results, renew your prescriptions, schedule appointments and more. To sign up, go to [email or web address] . Click on \"Log in\" on the left side of the screen, which will take you to the Welcome page. Then click on \"Sign up Now\" on the right side of the page.     You will be asked to enter the access code listed below, as well as some personal information. Please follow the directions to create your username and password.     Your access code is: [email or web address]  Expires: 2017 11:31 AM     Your access code will  in 90 days. If you need help or a new code, please call your [facility] clinic or [phone].        Care EveryWhere ID     This is your Care EveryWhere ID. This could be used by other organizations to access your [facility] medical records  [id number]        After Visit Summary       This is your record. Keep this with you and show to your community pharmacist(s) and doctor(s) at your next visit.                  "